# Patient Record
Sex: FEMALE | Race: WHITE | NOT HISPANIC OR LATINO | Employment: UNEMPLOYED | URBAN - NONMETROPOLITAN AREA
[De-identification: names, ages, dates, MRNs, and addresses within clinical notes are randomized per-mention and may not be internally consistent; named-entity substitution may affect disease eponyms.]

---

## 2019-08-28 ENCOUNTER — TRANSCRIBE ORDERS (OUTPATIENT)
Dept: LAB | Facility: HOSPITAL | Age: 74
End: 2019-08-28

## 2019-08-28 DIAGNOSIS — I63.9 IMPENDING CEREBROVASCULAR ACCIDENT (HCC): Primary | ICD-10-CM

## 2019-09-03 ENCOUNTER — APPOINTMENT (OUTPATIENT)
Dept: ULTRASOUND IMAGING | Facility: HOSPITAL | Age: 74
End: 2019-09-03

## 2021-12-15 ENCOUNTER — HOSPITAL ENCOUNTER (INPATIENT)
Facility: HOSPITAL | Age: 76
LOS: 14 days | Discharge: HOME HEALTH | End: 2021-12-29
Attending: FAMILY MEDICINE | Admitting: FAMILY MEDICINE

## 2021-12-15 DIAGNOSIS — Z87.81 S/P RIGHT HIP FRACTURE: Primary | ICD-10-CM

## 2021-12-15 PROCEDURE — 99223 1ST HOSP IP/OBS HIGH 75: CPT | Performed by: FAMILY MEDICINE

## 2021-12-15 RX ORDER — METOPROLOL SUCCINATE 25 MG/1
25 TABLET, EXTENDED RELEASE ORAL DAILY
Status: CANCELLED | OUTPATIENT
Start: 2021-12-15

## 2021-12-15 RX ORDER — LISINOPRIL 10 MG/1
20 TABLET ORAL DAILY
Status: CANCELLED | OUTPATIENT
Start: 2021-12-15

## 2021-12-15 RX ORDER — ESCITALOPRAM OXALATE 10 MG/1
10 TABLET ORAL DAILY
COMMUNITY

## 2021-12-15 RX ORDER — ATORVASTATIN CALCIUM 20 MG/1
20 TABLET, FILM COATED ORAL DAILY
Status: CANCELLED | OUTPATIENT
Start: 2021-12-15

## 2021-12-15 RX ORDER — METOPROLOL SUCCINATE 25 MG/1
25 TABLET, EXTENDED RELEASE ORAL DAILY
COMMUNITY
End: 2021-12-29 | Stop reason: HOSPADM

## 2021-12-15 RX ORDER — DONEPEZIL HYDROCHLORIDE 5 MG/1
5 TABLET, FILM COATED ORAL NIGHTLY
Status: CANCELLED | OUTPATIENT
Start: 2021-12-15

## 2021-12-15 RX ORDER — CLOPIDOGREL BISULFATE 75 MG/1
75 TABLET ORAL DAILY
Status: DISCONTINUED | OUTPATIENT
Start: 2021-12-16 | End: 2021-12-29 | Stop reason: HOSPADM

## 2021-12-15 RX ORDER — ATORVASTATIN CALCIUM 20 MG/1
20 TABLET, FILM COATED ORAL DAILY
COMMUNITY

## 2021-12-15 RX ORDER — MULTIPLE VITAMINS W/ MINERALS TAB 9MG-400MCG
1 TAB ORAL DAILY
Status: CANCELLED | OUTPATIENT
Start: 2021-12-15

## 2021-12-15 RX ORDER — HYDROCODONE BITARTRATE AND ACETAMINOPHEN 7.5; 325 MG/1; MG/1
1 TABLET ORAL EVERY 6 HOURS PRN
Status: DISCONTINUED | OUTPATIENT
Start: 2021-12-15 | End: 2021-12-29 | Stop reason: HOSPADM

## 2021-12-15 RX ORDER — AMLODIPINE BESYLATE 5 MG/1
5 TABLET ORAL DAILY
COMMUNITY

## 2021-12-15 RX ORDER — CETIRIZINE HYDROCHLORIDE 10 MG/1
5 TABLET ORAL DAILY
Status: DISCONTINUED | OUTPATIENT
Start: 2021-12-16 | End: 2021-12-29 | Stop reason: HOSPADM

## 2021-12-15 RX ORDER — CITALOPRAM 20 MG/1
20 TABLET ORAL DAILY
Status: DISCONTINUED | OUTPATIENT
Start: 2021-12-16 | End: 2021-12-16

## 2021-12-15 RX ORDER — DONEPEZIL HYDROCHLORIDE 5 MG/1
5 TABLET, FILM COATED ORAL NIGHTLY
Status: DISCONTINUED | OUTPATIENT
Start: 2021-12-15 | End: 2021-12-29 | Stop reason: HOSPADM

## 2021-12-15 RX ORDER — LISINOPRIL 20 MG/1
20 TABLET ORAL DAILY
COMMUNITY

## 2021-12-15 RX ORDER — DONEPEZIL HYDROCHLORIDE 5 MG/1
5 TABLET, FILM COATED ORAL NIGHTLY
COMMUNITY

## 2021-12-15 RX ORDER — AMLODIPINE BESYLATE 5 MG/1
5 TABLET ORAL
Status: DISCONTINUED | OUTPATIENT
Start: 2021-12-16 | End: 2021-12-29 | Stop reason: HOSPADM

## 2021-12-15 RX ORDER — LISINOPRIL 10 MG/1
20 TABLET ORAL
Status: DISCONTINUED | OUTPATIENT
Start: 2021-12-16 | End: 2021-12-29 | Stop reason: HOSPADM

## 2021-12-15 RX ORDER — CETIRIZINE HYDROCHLORIDE 10 MG/1
10 TABLET ORAL DAILY
COMMUNITY

## 2021-12-15 RX ORDER — MULTIPLE VITAMINS W/ MINERALS TAB 9MG-400MCG
1 TAB ORAL DAILY
COMMUNITY

## 2021-12-15 RX ORDER — CLOPIDOGREL BISULFATE 75 MG/1
75 TABLET ORAL DAILY
COMMUNITY

## 2021-12-15 RX ORDER — ATORVASTATIN CALCIUM 20 MG/1
20 TABLET, FILM COATED ORAL NIGHTLY
Status: DISCONTINUED | OUTPATIENT
Start: 2021-12-15 | End: 2021-12-29 | Stop reason: HOSPADM

## 2021-12-15 RX ORDER — CETIRIZINE HYDROCHLORIDE 10 MG/1
10 TABLET ORAL DAILY
Status: CANCELLED | OUTPATIENT
Start: 2021-12-15

## 2021-12-15 RX ORDER — METOPROLOL SUCCINATE 25 MG/1
25 TABLET, EXTENDED RELEASE ORAL
Status: DISCONTINUED | OUTPATIENT
Start: 2021-12-16 | End: 2021-12-24

## 2021-12-15 RX ORDER — ASPIRIN 81 MG/1
81 TABLET, CHEWABLE ORAL DAILY
COMMUNITY
End: 2021-12-29 | Stop reason: HOSPADM

## 2021-12-15 RX ORDER — ESCITALOPRAM OXALATE 10 MG/1
10 TABLET ORAL DAILY
Status: CANCELLED | OUTPATIENT
Start: 2021-12-15

## 2021-12-15 RX ORDER — DIPHENOXYLATE HYDROCHLORIDE AND ATROPINE SULFATE 2.5; .025 MG/1; MG/1
1 TABLET ORAL DAILY
Status: DISCONTINUED | OUTPATIENT
Start: 2021-12-16 | End: 2021-12-29 | Stop reason: HOSPADM

## 2021-12-15 RX ORDER — CLOPIDOGREL BISULFATE 75 MG/1
75 TABLET ORAL DAILY
Status: CANCELLED | OUTPATIENT
Start: 2021-12-15

## 2021-12-15 RX ORDER — AMLODIPINE BESYLATE 5 MG/1
5 TABLET ORAL DAILY
Status: CANCELLED | OUTPATIENT
Start: 2021-12-15

## 2021-12-15 RX ORDER — ASPIRIN 81 MG/1
81 TABLET, CHEWABLE ORAL DAILY
Status: CANCELLED | OUTPATIENT
Start: 2021-12-15

## 2021-12-15 RX ADMIN — HYDROCODONE BITARTRATE AND ACETAMINOPHEN 1 TABLET: 7.5; 325 TABLET ORAL at 18:13

## 2021-12-15 RX ADMIN — ATORVASTATIN CALCIUM 20 MG: 20 TABLET, FILM COATED ORAL at 20:39

## 2021-12-15 RX ADMIN — DONEPEZIL HYDROCHLORIDE 5 MG: 5 TABLET, FILM COATED ORAL at 20:39

## 2021-12-15 NOTE — PROGRESS NOTES
Patient Assessment Instrument  Quality Indicators - Admission    Section B. Hearing, Speech Vision  Expression of Ideas and Wants: Expresses complex messages without difficulty and  with speech that is clear and easy to understand.  Understanding Verbal and Non-Verbal Content: Understands: Clear comprehension  without cues or repetitions.    Section C. Cognitive Patterns      Section [id number]. Prior Functioning      Section UJ4539. Prior Device Use      Section NU4503. Self Care Performance      Section WV7991. Self Care Discharge Goals      Section GJ8613. Mobility Performance      Section FJ7326. Mobility Discharge Goals      Section H. Bladder and Bowel      Section I. Active Diagnosis      Section J. Health Conditions      Section K. Swallowing/Nutritional Status      Section M. Skin Conditions      Section N. Medication      Section O. Special Treatments, Procedures, and Programs      OPTIONAL BRANCH FOR TRACKING FALLS  Fall(s) During Shift:    Signed by: Nurse Arpita

## 2021-12-15 NOTE — PROGRESS NOTES
Rehabilitation Nursing  Inpatient Rehabilitation Admission Assessment of Functional Measures  and Plan of Care    Plan of Care  Copy from POC    Functional Measures  SEAN Eating:  SEAN Grooming:  SEAN Bathing:  SEAN Upper Body Dressing:  SEAN Lower Body Dressing:  SEAN Toileting:    SEAN Bladder Management  Level of Assistance:  Frequency/Number of Accidents (4 days prior to admission):  Frequency/Number of Accidents this Shift:    SEAN Bowel Management  Level of Assistance:  Frequency/Number of Accidents (4 days prior to admission):  Frequency/Number of Accidents this Shift:    SEAN Bed/Chair/Wheelchair Transfer:  SEAN Toilet Transfer:  SEAN Tub/Shower Transfer:    Previously Documented Mode of Locomotion at Discharge:  [facility] Expected Mode of Locomotion at Discharge:  SEAN Walk/Wheelchair:  SEAN Stairs:    SEAN Comprehension:  SEAN Expression:  SEAN Social Interaction:  [facility] Problem Solving:  SEAN Memory:    Discharge Functional Goals:    Signed by: Phyllis Johnson Nurse

## 2021-12-15 NOTE — H&P
[facility] HOSPITALIST HISTORY AND PHYSICAL    Patient Identification:  Name:  Mellisa Sneed  Age:  76 y.o.  Sex:  female  :  1945  MRN:  [id number]   Visit Number:  [id number]  Room number:  107/1S  Primary Care Physician:  Rosario Cates MD     Subjective     12/15/2021   Chief complaint:  No chief complaint on file.      History of presenting illness:  76 y.o. female  This pt is seen today for post admission physician evaluation to the inpatient rehabilitation facility.  Patient 76-year-old female with a past history of coronary disease with history of CABG, coronary stent, hyperlipidemia, hypertension.  Patient states that she was home caring her puppy when she was startled and tripped and fell.  Patient fractured her right hip and was taken to [facility] in [place].  Patient had open reduction internal fixation with right hip hemiarthroplasty per Dr. Montemayor.  Had uncomplicated hospital course and was thought to be would benefit from PT OT and inpatient rehab and we agreed except patient in transfer      Patient continued to progress medically.  Physical therapy and Occupational therapy evaluations were completed with recommended acute inpatient rehabilitation referral for continued functional mobility intervention and reeducation.  Acute rehab referral ordered for continued medical monitoring and management post prolonged hospitalization, continued respiratory status monitoring, lab monitoring, pain mgt needs, bowel/bladder care with new medication education, skin monitoring and breakdown prevention along with ongoing medical comorbidities that require ongoing care.    The preadmission mini-FIM score as assessed by the referring facility as follows: Eating 5; grooming 4; bathing 2; dressing upper body for; dressing lower body 2; toileting to; transfers to bed chair wheelchair 3; transfers toilet 3; locomotion 3; bladder management 6; bowel management 6; comprehension 5; expression 7;  social reaction 7; problem 7 follow-up 5; memory 5.  Estimate length stay 2weeks.    ---------------------------------------------------------------------------------------------------------------------   Review of Systems:  General: No fever no chills  HEENT: Negative  Heart: No chest pain  Lungs: Negative  Abdomen: Negative  : Negative  Musculoskeletal: Right hip soreness  Neuro: Negative  Skin: Negative  ---------------------------------------------------------------------------------------------------------------------   No past medical history on file.  No past surgical history on file.  No family history on file.  Social History     Socioeconomic History   • Marital status:    Past medical history coronary disease with history of CABG in coronary artery stent placement  Hypertension  Hyperlipidemia  Dementia    Social history has quit smoking.  No ethanol use    Family history unknown    Allergies no known drug allergies  ---------------------------------------------------------------------------------------------------------------------   Allergies:  Patient has no allergy information on record.  ---------------------------------------------------------------------------------------------------------------------   Medications below are reported home medications pulling from within the system; at this time, these medications have not been reconciled unless otherwise specified and are in the verification process for further verifcation as current home medications.    Prior to Admission Medications     None        Objective     Vital Signs:  Temp:  [97.8 °F (36.6 °C)] 97.8 °F (36.6 °C)  Heart Rate:  [62] 62  Resp:  [18] 18  BP: (158)/(71) 158/71    No data found.  SpO2:  [95 %] 95 %  on   ;   Device (Oxygen Therapy): room air  Body mass index is 24.67 kg/m².    Wt Readings from Last 3 Encounters:   12/15/21 51.7 kg (114 lb)       ---------------------------------------------------------------------------------------------------------------------     Physical exam:  Constitutional:   Elderly female no distress  HEENT: Normocephalic atraumatic  Neck: Supple   Cardiovascular: Regular rate and rhythm  Pulmonary/Chest: Clear to auscultation  Abdominal: Positive bowel sounds soft.   Musculoskeletal: Right hip--wound is dressed at this time but there is no erythema surrounding wound site.  Some bruising noted  Neurological: No focal deficits  Skin: No rash  Peripheral vascular:  Genitourinary::  ---------------------------------------------------------------------------------------------------------------------    No orders to display           Last echocardiogram:    --------------------------------------------------------------------------------------------------------------------  Labs:                Invalid input(s): PROTCrCl cannot be calculated (No successful lab value found.).  No results found for: AMMONIA          No results found for: HGBA1C, POCGLU  No results found for: TSH, FREET4  No results found for: PREGTESTUR, PREGSERUM, HCG, HCGQUANT  Pain Management Panel    There is no flowsheet data to display.       Brief Urine Lab Results     None        No results found for: BLOODCX  No results found for: URINECX  No results found for: WOUNDCX  No results found for: STOOLCX    Last Urine Toxicity    There is no flowsheet data to display.         I have personally looked at the labs and they are summarized above.  ----------------------------------------------------------------------------------------------------------------------  Detailed radiology reports for the last 24 hours:    Imaging Results (Last 24 Hours)     ** No results found for the last 24 hours. **        Final impressions for the last 30 days of radiology reports:    No radiology results for the last 30 days.  I have personally looked at the radiology images and read the  final radiology report.    Assessment & Plan    Status post right hip fracture with right hemiarthroplasty--patient will require physical therapy 90 minutes/day 5 to 6 days/week for up with training, safety, stair navigation, strengthening, therapeutic exercise, range of motion, endurance and gait training.  Require occupational therapy 90 minutes/day 5 to 6 days/week for help with dressing, ADLs, feeding, home skills, safety and toileting.  We expect patient to be able to perform activities of daily living using adaptive equipment for improved function mobility.  Independent safe bowel bladder function.  Able to navigate home community territory safe without injury or falls.  We expect patient will go home with assistance will require home health for PT OT likely require walker and bedside commode at home.    CAD--continue Lipitor, Toprol-XL 25 mg daily, Plavix 75 mg daily    Hypertension continue Toprol, amlodipine, lisinopril    Dementia Aricept 5 mg nightly    Depression Celexa 10 mg daily      VTE Prophylaxis:   Mechanical Order History:     None      Pharmalogical Order History:      Ordered     Dose Route Frequency Stop    12/15/21 1445  enoxaparin (LOVENOX) syringe 40 mg         40 mg SC Every 24 Hours 01/04/22 1529                Fall risk evaluation: Risk for fall secondary to hip fracture        Dewey Campos MD  [facility]  12/15/21  14:46 EST

## 2021-12-16 LAB
ANION GAP SERPL CALCULATED.3IONS-SCNC: 12 MMOL/L (ref 5–15)
BASOPHILS # BLD AUTO: 0.1 10*3/MM3 (ref 0–0.2)
BASOPHILS NFR BLD AUTO: 1.1 % (ref 0–1.5)
BUN SERPL-MCNC: 25 MG/DL (ref 8–23)
BUN/CREAT SERPL: 25 (ref 7–25)
CALCIUM SPEC-SCNC: 8.7 MG/DL (ref 8.6–10.5)
CHLORIDE SERPL-SCNC: 106 MMOL/L (ref 98–107)
CO2 SERPL-SCNC: 20 MMOL/L (ref 22–29)
CREAT SERPL-MCNC: 1 MG/DL (ref 0.57–1)
DEPRECATED RDW RBC AUTO: 48.4 FL (ref 37–54)
EOSINOPHIL # BLD AUTO: 0.32 10*3/MM3 (ref 0–0.4)
EOSINOPHIL NFR BLD AUTO: 3.6 % (ref 0.3–6.2)
ERYTHROCYTE [DISTWIDTH] IN BLOOD BY AUTOMATED COUNT: 14.1 % (ref 12.3–15.4)
GFR SERPL CREATININE-BSD FRML MDRD: 54 ML/MIN/1.73
GLUCOSE SERPL-MCNC: 106 MG/DL (ref 65–99)
HCT VFR BLD AUTO: 28.1 % (ref 34–46.6)
HGB BLD-MCNC: 9 G/DL (ref 12–15.9)
IMM GRANULOCYTES # BLD AUTO: 0.04 10*3/MM3 (ref 0–0.05)
IMM GRANULOCYTES NFR BLD AUTO: 0.4 % (ref 0–0.5)
LYMPHOCYTES # BLD AUTO: 2.49 10*3/MM3 (ref 0.7–3.1)
LYMPHOCYTES NFR BLD AUTO: 27.9 % (ref 19.6–45.3)
MCH RBC QN AUTO: 30.4 PG (ref 26.6–33)
MCHC RBC AUTO-ENTMCNC: 32 G/DL (ref 31.5–35.7)
MCV RBC AUTO: 94.9 FL (ref 79–97)
MONOCYTES # BLD AUTO: 1.07 10*3/MM3 (ref 0.1–0.9)
MONOCYTES NFR BLD AUTO: 12 % (ref 5–12)
NEUTROPHILS NFR BLD AUTO: 4.92 10*3/MM3 (ref 1.7–7)
NEUTROPHILS NFR BLD AUTO: 55 % (ref 42.7–76)
NRBC BLD AUTO-RTO: 0 /100 WBC (ref 0–0.2)
PLATELET # BLD AUTO: 256 10*3/MM3 (ref 140–450)
PMV BLD AUTO: 10.2 FL (ref 6–12)
POTASSIUM SERPL-SCNC: 4 MMOL/L (ref 3.5–5.2)
RBC # BLD AUTO: 2.96 10*6/MM3 (ref 3.77–5.28)
SODIUM SERPL-SCNC: 138 MMOL/L (ref 136–145)
WBC NRBC COR # BLD: 8.94 10*3/MM3 (ref 3.4–10.8)

## 2021-12-16 PROCEDURE — 25010000002 ENOXAPARIN PER 10 MG: Performed by: FAMILY MEDICINE

## 2021-12-16 PROCEDURE — 97535 SELF CARE MNGMENT TRAINING: CPT

## 2021-12-16 PROCEDURE — 97110 THERAPEUTIC EXERCISES: CPT

## 2021-12-16 PROCEDURE — 99231 SBSQ HOSP IP/OBS SF/LOW 25: CPT | Performed by: FAMILY MEDICINE

## 2021-12-16 PROCEDURE — 97161 PT EVAL LOW COMPLEX 20 MIN: CPT

## 2021-12-16 PROCEDURE — 97530 THERAPEUTIC ACTIVITIES: CPT

## 2021-12-16 PROCEDURE — 85025 COMPLETE CBC W/AUTO DIFF WBC: CPT | Performed by: FAMILY MEDICINE

## 2021-12-16 PROCEDURE — 97167 OT EVAL HIGH COMPLEX 60 MIN: CPT

## 2021-12-16 PROCEDURE — 97116 GAIT TRAINING THERAPY: CPT

## 2021-12-16 PROCEDURE — 80048 BASIC METABOLIC PNL TOTAL CA: CPT | Performed by: FAMILY MEDICINE

## 2021-12-16 RX ORDER — ESCITALOPRAM OXALATE 10 MG/1
10 TABLET ORAL DAILY
Status: DISCONTINUED | OUTPATIENT
Start: 2021-12-17 | End: 2021-12-29 | Stop reason: HOSPADM

## 2021-12-16 RX ADMIN — DONEPEZIL HYDROCHLORIDE 5 MG: 5 TABLET, FILM COATED ORAL at 20:24

## 2021-12-16 RX ADMIN — AMLODIPINE BESYLATE 5 MG: 5 TABLET ORAL at 09:38

## 2021-12-16 RX ADMIN — Medication 1 TABLET: at 09:38

## 2021-12-16 RX ADMIN — CLOPIDOGREL 75 MG: 75 TABLET, FILM COATED ORAL at 09:38

## 2021-12-16 RX ADMIN — ENOXAPARIN SODIUM 40 MG: 40 INJECTION SUBCUTANEOUS at 17:15

## 2021-12-16 RX ADMIN — METOPROLOL SUCCINATE 25 MG: 25 TABLET, EXTENDED RELEASE ORAL at 09:38

## 2021-12-16 RX ADMIN — CITALOPRAM HYDROBROMIDE 20 MG: 20 TABLET ORAL at 09:38

## 2021-12-16 RX ADMIN — HYDROCODONE BITARTRATE AND ACETAMINOPHEN 1 TABLET: 7.5; 325 TABLET ORAL at 13:59

## 2021-12-16 RX ADMIN — LISINOPRIL 20 MG: 10 TABLET ORAL at 09:38

## 2021-12-16 RX ADMIN — CETIRIZINE HYDROCHLORIDE 5 MG: 10 TABLET, FILM COATED ORAL at 09:38

## 2021-12-16 RX ADMIN — ATORVASTATIN CALCIUM 20 MG: 20 TABLET, FILM COATED ORAL at 20:24

## 2021-12-16 NOTE — THERAPY EVALUATION
Inpatient Rehabilitation - Physical Therapy Initial Evaluation       MIAN Pizano     Patient Name: Mellisa Sneed  : 1945  MRN: [id number]    Today's Date: 2021                    Admit Date: 12/15/2021      Visit Dx:   No diagnosis found.    Patient Active Problem List   Diagnosis   • S/P right hip fracture       Past Medical History:   Diagnosis Date   • Arthritis    • CHF (congestive heart failure) (HCC)    • Hypertension        Past Surgical History:   Procedure Laterality Date   • CARDIAC SURGERY     • FRACTURE SURGERY         PT ASSESSMENT (last 12 hours)     IRF PT Evaluation and Treatment     Row Name 21 [facility]          PT Time and Intention    Document Type initial evaluation; daily treatment  -LB     Mode of Treatment individual therapy; physical therapy  -LB     Row Name 21 1351          General Information    Existing Precautions/Restrictions fall; right; hip  history of dementia  -LB     Row Name 21 135          Cognition/Psychosocial    Affect/Mental Status (Cognitive) --  alert and cooperative, confusion noted  -LB     Follows Commands (Cognition) verbal cues/prompting required; physical/tactile prompts required  -LB     Personal Safety Interventions gait belt; nonskid shoes/slippers when out of bed; supervised activity  -LB     Row Name 21 [facility]          Pain Scale: FACES Pre/Post-Treatment    Pain: FACES Scale, Pretreatment 6-->hurts even more  -LB     Posttreatment Pain Rating 6-->hurts even more  -LB     Pain Location - Side Right  -LB     Pain Location hip  -LB     Pre/Posttreatment Pain Comment rest, repositioned  -LB     Row Name 21 135          Mobility    Left Lower Extremity (Weight-bearing Status) weight-bearing as tolerated (WBAT)  -LB     Right Lower Extremity (Weight-bearing Status) weight-bearing as tolerated (WBAT)  -LB     Row Name 21 135          Bed Mobility    Supine-Sit [place] (Bed Mobility) minimum assist (75% patient effort);  verbal cues; nonverbal cues (demo/gesture)  -LB     Sit-Supine [place] (Bed Mobility) moderate assist (50% patient effort); verbal cues; nonverbal cues (demo/gesture)  -LB     Assistive Device (Bed Mobility) bed rails  -LB     Row Name 12/16/21 1351          Transfers    Bed-Chair [place] (Transfers) moderate assist (50% patient effort); verbal cues; nonverbal cues (demo/gesture)  -LB     Chair-Bed [place] (Transfers) moderate assist (50% patient effort); verbal cues; nonverbal cues (demo/gesture)  -LB     Assistive Device (Bed-Chair Transfers) wheelchair  -LB     Sit-Stand [place] (Transfers) minimum assist (75% patient effort); verbal cues; nonverbal cues (demo/gesture)  -LB     Stand-Sit [place] (Transfers) minimum assist (75% patient effort); verbal cues; nonverbal cues (demo/gesture)  -LB     Row Name 12/16/21 1351          Chair-Bed Transfer    Assistive Device (Chair-Bed Transfers) wheelchair  -LB     Row Name 12/16/21 1351          Sit-Stand Transfer    Assistive Device (Sit-Stand Transfers) walker, front-wheeled  -LB     Row Name 12/16/21 135          Stand-Sit Transfer    Assistive Device (Stand-Sit Transfers) walker, front-wheeled  -LB     Row Name 12/16/21 1351          Gait/Stairs (Locomotion)    [place] Level (Gait) contact guard; verbal cues; nonverbal cues (demo/gesture)  -LB     Assistive Device (Gait) walker, front-wheeled  -LB     Distance in Feet (Gait) am-60', pm-70'  -LB     Deviations/Abnormal Patterns (Gait) antalgic; luis decreased; gait speed decreased; stride length decreased  -LB     Bilateral Gait Deviations forward flexed posture; weight shift ability decreased  -LB     Row Name 12/16/21 1351          Safety Issues, Functional Mobility    Safety Issues Affecting Function (Mobility) insight into deficits/self-awareness; safety precautions follow-through/compliance  reviewed THR precautions  -LB     Impairments Affecting Function (Mobility) balance;  cognition; endurance/activity tolerance; pain; range of motion (ROM); strength  -LB     Row Name 12/16/21 [facility]          Motor Skills    Therapeutic Exercise --  sitting ex 2 sets, supine ex  -LB     Row Name 12/16/21 [facility]          IRF PT Goals    Bed Mobility Goal Selection (PT-IRF) bed mobility, PT goal 1  -LB     Transfer Goal Selection (PT-IRF) transfers, PT goal 1  -LB     Gait (Walking Locomotion) Goal Selection (PT-IRF) gait, PT goal 1  -LB     Row Name 12/16/21 [facility]          Bed Mobility Goal 1 (PT-IRF)    Activity/Assistive Device (Bed Mobility Goal 1, PT-IRF) sit to supine/supine to sit  -LB     [place] Level (Bed Mobility Goal 1, PT-IRF) independent  -LB     Time Frame (Bed Mobility Goal 1, PT-IRF) by discharge  -LB     Row Name 12/16/21 [facility]          Transfer Goal 1 (PT-IRF)    Activity/Assistive Device (Transfer Goal 1, PT-IRF) sit-to-stand/stand-to-sit; bed-to-chair/chair-to-bed  -LB     [place] Level (Transfer Goal 1, PT-IRF) supervision required  -LB     Time Frame (Transfer Goal 1, PT-IRF) by discharge  -LB     Row Name 12/16/21 [facility]          Gait/Walking Locomotion Goal 1 (PT-IRF)    Activity/Assistive Device (Gait/Walking Locomotion Goal 1, PT-IRF) walker, rolling  -LB     Gait/Walking Locomotion Distance Goal 1 (PT-IRF) 300'  -LB     [place] Level (Gait/Walking Locomotion Goal 1, PT-IRF) supervision required  -LB     Time Frame (Gait/Walking Locomotion Goal 1, PT-IRF) by discharge  -LB     Row Name 12/16/21 [facility]          Positioning and Restraints    Pre-Treatment Position --  am-w/c, pm-bed  -LB     In Wheelchair with OT  bid  -LB     Row Name 12/16/21 [facility]          Therapy Assessment/Plan (PT)    Patient's Goals For Discharge return home  -LB     Row Name 12/16/21 [facility]          Therapy Assessment/Plan (PT)    PT Diagnosis (PT) s/p R THR  -LB     Rehab Potential/Prognosis (PT) adequate, monitor progress closely  -LB     Frequency of Treatment (PT) 5 times per week  -LB      Estimated Duration of Therapy (PT) 2 weeks  -LB     Problem List (PT) balance; cognition; mobility; range of motion (ROM); strength; pain  -LB     Activity Limitations Related to Problem List (PT) unable to ambulate safely; unable to transfer safely  -LB     Row Name 12/16/21 1351          Therapy Plan Review/Discharge Plan (PT)    Therapy Plan Review (PT) evaluation/treatment results reviewed; care plan/treatment goals reviewed; risks/benefits reviewed; participants voiced agreement with care plan  -LB     Anticipated Equipment Needs at Discharge (PT Eval) --  tbd  -LB     Expected Discharge Disposition (PT Eval) home with home health care; home with caregiver  -LB           User Key  (r) = Recorded By, (t) = Taken By, (c) = Cosigned By    Initials Name Provider Type    Juanita Lopez, PT Physical Therapist              Wound 12/15/21 1530 Right anterior greater trochanter (Active)   Dressing Appearance intact 12/16/21 0934   Closure RAMON 12/15/21 1910   Base dressing in place, unable to visualize 12/15/21 1910   Periwound edematous; ecchymotic 12/15/21 1910   Drainage Characteristics/Odor sanguineous 12/15/21 1531   Drainage Amount small 12/15/21 1531   Dressing Care other (see comments) 12/15/21 1910     Physical Therapy Education                 Title: PT OT SLP Therapies (Done)     Topic: Physical Therapy (Done)     Point: Mobility training (Done)     Learning Progress Summary           Patient Acceptance, E, VU,NR by LB at 12/16/2021 1401                   Point: Home exercise program (Done)     Learning Progress Summary           Patient Acceptance, E, VU,NR by LB at 12/16/2021 1401                   Point: Body mechanics (Done)     Learning Progress Summary           Patient Acceptance, E, VU,NR by LB at 12/16/2021 1401                   Point: Precautions (Done)     Learning Progress Summary           Patient Acceptance, E, VU,NR by LB at 12/16/2021 1401                               User Key      Initials Effective Dates Name Provider Type Discipline    LB 06/16/21 -  Juanita Perez PT Physical Therapist PT                PT Recommendation and Plan    Planned Therapy Interventions (PT): balance training, bed mobility training, gait training, patient/family education, ROM (range of motion), strengthening, transfer training  Frequency of Treatment (PT): 5 times per week  Anticipated Equipment Needs at Discharge (PT Eval):  (tbd)                  Time Calculation:      PT Charges     Row Name 12/16/21 1402 12/16/21 1401          Time Calculation    Start Time 1245  -LB 1000  -LB     Stop Time 1330  -LB 1045  -LB     Time Calculation (min) 45 min  -LB 45 min  -LB     PT Received On -- 12/16/21  -LB     PT Goal Re-Cert Due Date -- 12/23/21  -LB           User Key  (r) = Recorded By, (t) = Taken By, (c) = Cosigned By    Initials Name Provider Type    LB Juanita Perez, PT Physical Therapist                Therapy Charges for Today     Code Description Service Date Service Provider Modifiers Qty    12574625441 HC PT EVAL LOW COMPLEXITY 1 12/16/2021 Juanita Perez, PT GP 1    92717407787 HC GAIT TRAINING EA 15 MIN 12/16/2021 Juanita Perez, PT GP 2    30327085576 HC PT THER PROC EA 15 MIN 12/16/2021 Juanita Perez, PT GP 3                   Juanita Perez, PT  12/16/2021

## 2021-12-16 NOTE — PLAN OF CARE
Goal Outcome Evaluation:  Plan of Care Reviewed With: patient        Progress: improving  Outcome Summary: Patient done scheduled therapies. No complaints at this time. PRN pain medications given. Continue POC

## 2021-12-16 NOTE — PROGRESS NOTES
Patient Assessment Instrument  Quality Indicators - Admission    Section B. Hearing, Speech Vision      Section C. Cognitive Patterns      Section QL4670. Prior Functioning      Section SA2221. Prior Device Use      Section AJ6207. Self Care Performance      Section WG9900. Self Care Discharge Goals      Section YJ7747. Mobility Performance     Roll Left and Right: [place] does less than half the effort. [place] lifts, holds  or supports trunk or limbs but provides less than half the effort.   Sit to Lying: [place] does less than half the effort. [place] lifts, holds or  supports trunk or limbs but provides less than half the effort.   Lying to Sitting on Side of Bed: [place] does less than half the effort. [place]  lifts, holds or supports trunk or limbs but provides less than half the effort.   Sit to Stand: [place] does less than half the effort. [place] lifts, holds or  supports trunk or limbs but provides less than half the effort.   Chair/Bed to Chair Transfer: [place] does less than half the effort. [place]  lifts, holds or supports trunk or limbs but provides less than half the effort.   Toilet Transfer [place] does less than half the effort. [place] lifts, holds or  supports trunk or limbs but provides less than half the effort.   Car Transfer: Not attempted due to medical or safety concerns.   Walk 10 Feet:   [place] provides verbal cues and/or touching/steadying and/or  contact guard assistance as patient completes activity. Assistance may be  provided throughout the activity or intermittently.  Walk 50 Feet with 2 Turns:   [place] provides verbal cues and/or  touching/steadying and/or contact guard assistance as patient completes  activity. Assistance may be provided throughout the activity or intermittently.  Walk 150 Feet:   Not attempted due to medical or safety concerns.  Walking 10 Feet on Uneven Surfaces:   Not attempted due to medical or safety  concerns.  1 Step Over Curb or Up/Down Stair:   Not attempted due  to medical or safety  concerns.  Picking up an Object:   Not attempted due to medical or safety concerns.  Uses Wheelchair/Scooter: No    Section FH3962. Mobility Discharge Goals     Sit to Stand: [place] provides verbal cues or touching/steadying assistance as  patient completes activity.   Chair/Bed to Chair Transfer: [place] provides verbal cues or touching/steadying  assistance as patient completes activity.   Walk Discharge Goals:   Walk 150 Feet: [place] provides verbal cues or touching/steadying assistance as  patient completes activity.    Section H. Bladder and Bowel      Section I. Active Diagnosis      Section J. Health Conditions      Section K. Swallowing/Nutritional Status      Section M. Skin Conditions      Section N. Medication      Section O. Special Treatments, Procedures, and Programs      OPTIONAL BRANCH FOR TRACKING FALLS  Fall(s) During Shift:    Signed by: Juanita Perez, Physical Therapist

## 2021-12-16 NOTE — PROGRESS NOTES
Inpatient Rehabilitation Functional Measures Assessment and Plan of Care    Plan of Care      Functional Measures  SEAN Eating:  SEAN Grooming:  SEAN Bathing:  SEAN Upper Body Dressing:  SEAN Lower Body Dressing:  SEAN Toileting:    SEAN Bladder Management  Level of Assistance:  Frequency/Number of Accidents this Shift:    SEAN Bowel Management  Level of Assistance:  Frequency/Number of Accidents this Shift:    SEAN Bed/Chair/Wheelchair Transfer:  Bed/chair/wheelchair Transfer Score = 3.  Patient performs 50-74% of effort and requires moderate assistance (some  lifting) for transferring to and from the bed/chair/wheelchair. Patient requires  the following assistive device(s): Walker. Seating system of wheelchair.  SEAN Toilet Transfer:  SEAN Tub/Shower Transfer:    Previously Documented Mode of Locomotion at Discharge:  SEAN Expected Mode of Locomotion at Discharge: The expected mode of most  frequently used locomotion, at discharge, is expected to be walking.  SEAN Walk/Wheelchair:  WHEELCHAIR OBSERVATION   Wheelchair did not occur.    WALK OBSERVATION   Walk Distance Scale = 2.  Distance walked is 50 -149 feet. Walk Score = 2.  Patient performs 75% or more of effort and requires minimal assistance.  Incidental assistance, contact guard or steadying was provided. Patient walked a  distance of 70 feet. Patient requires the following assistive device(s): Rolling  walker.  SEAN Stairs:  Stairs did not occur.    SEAN Comprehension:  SEAN Expression:  SEAN Social Interaction:  SEAN Problem Solving:  SEAN Memory:    Therapy Mode Minutes  Occupational Therapy:  Physical Therapy: Individual: 90 minutes.  Speech Language Pathology:    Discharge Functional Goals:  Bed, Chair, Wheelchair Transfers: 5  Walk: 5    Signed by: Juanita Perez, Physical Therapist

## 2021-12-16 NOTE — PROGRESS NOTES
Patient Assessment Instrument  Quality Indicators - Admission    Section B. Hearing, Speech Vision      Section C. Cognitive Patterns      Section LA8567. Prior Functioning      Section [id number]. Prior Device Use      Section [id number]. Self Care Performance     Eating: [place] sets up or cleans up; patient completes activity. [place] assists  only prior to or following the activity.   Oral Hygiene: [place] sets up or cleans up; patient completes activity. [place]  assists only prior to or following the activity.   Toileting Hygiene: [place] does all of the effort. Patient does none of the  effort to complete the activity. Or, the assistance of 2 or more helpers is  required for the patient to complete the activity.   Shower/Bathe Self: [place] does more than half the effort. [place] lifts or holds  trunk or limbs and provides more than half the effort.   Upper Body Dressing: [place] does less than half the effort. [place] lifts, holds  or supports trunk or limbs but provides less than half the effort.   Lower Body Dressing: [place] does all of the effort. Patient does none of the  effort to complete the activity. Or, the assistance of 2 or more helpers is  required for the patient to complete the activity.   Putting On/Taking Off Footwear: [place] does all of the effort. Patient does  none of the effort to complete the activity. Or, the assistance of 2 or more  helpers is required for the patient to complete the activity.    Section CT8067. Self Care Discharge Goals     Eating: Patient completed the activities by him/herself with no assistance from  a helper.   Oral Hygiene: [place] sets up or cleans up; patient completes activity. [place]  assists only prior to or following the activity.   Toileting Hygiene: [place] does less than half the effort. [place] lifts, holds  or supports trunk or limbs but provides less than half the effort.   Shower/Bathe Self: [place] does less than half the effort. [place] lifts, holds  or supports trunk  or limbs but provides less than half the effort.   Upper Body Dressing: [place] sets up or cleans up; patient completes activity.  [place] assists only prior to or following the activity.   Lower Body Dressing: [place] does less than half the effort. [place] lifts, holds  or supports trunk or limbs but provides less than half the effort.   Putting On/Taking Off Footwear: [place] does less than half the effort. [place]  lifts, holds or supports trunk or limbs but provides less than half the effort.    Section KY5483. Mobility Performance      Section DS3754. Mobility Discharge Goals      Section H. Bladder and Bowel      Section I. Active Diagnosis      Section J. Health Conditions      Section K. Swallowing/Nutritional Status      Section M. Skin Conditions      Section N. Medication      Section O. Special Treatments, Procedures, and Programs      OPTIONAL BRANCH FOR TRACKING FALLS  Fall(s) During Shift:    Signed by: Sherry Varela Occupational Therapist

## 2021-12-16 NOTE — THERAPY TREATMENT NOTE
Inpatient Rehabilitation - Occupational Therapy Treatment Note     [facility]     Patient Name: Mellisa Sneed  : 1945  MRN: [id number]    Today's Date: 2021                 Admit Date: 12/15/2021       No diagnosis found.    Patient Active Problem List   Diagnosis   • S/P right hip fracture       Past Medical History:   Diagnosis Date   • Arthritis    • CHF (congestive heart failure) (HCC)    • Hypertension        Past Surgical History:   Procedure Laterality Date   • CARDIAC SURGERY     • FRACTURE SURGERY               IRF OT ASSESSMENT FLOWSHEET (last 12 hours)     IRF OT Evaluation and Treatment     Row Name 21 1514 21 1107       OT Time and Intention    Document Type daily treatment  -CJ initial evaluation  -TM    Mode of Treatment occupational therapy  -CJ occupational therapy  -TM    Patient Effort -- adequate  -TM    Symptoms Noted During/After Treatment --  c/o R hip pain;  RN notified  - none  -TM    Row Name 21 1107          Relationship/Environment    Lives With alone; other (see comments)  pt reported her 25 year old grandson lives at times with her.  -     Row Name 21 1514 21 1107       General Information    Patient Profile Reviewed -- yes  -TM    Patient/Family/Caregiver Comments/Observations agreeable to therapy  - --    General Observations of Patient -- Pt agreeable for therapy.  -TM    Existing Precautions/Restrictions fall; hip  -CJ fall; hip  -TM    Row Name 21 1107          Previous Level of Function/Home Environm    BADLs, Premorbid Functional Level independent  -TM     Row Name 21 1107          Living Environment    Current Living Arrangements home/apartment/condo; other (see comments)  apartment  -TM     Home Accessibility stairs to enter home  -TM     Row Name 21 1107          Home Use of Assistive/Adaptive Equipment    Equipment Currently Used at Home cane, quad; wheelchair; other (see comments)  per pt  -TM     Row Name  12/16/21 1514 12/16/21 1107       Cognition/Psychosocial    Affect/Mental Status (Cognitive) --  confusion noted  - --    Orientation Status (Cognition) -- oriented to; person; situation; verbal cues/prompts needed for orientation; other (see comments)  confused at times  -    Follows Commands (Cognition) verbal cues/prompting required; repetition of directions required  -CJ follows one-step commands; verbal cues/prompting required; repetition of directions required; increased processing time needed  -    Row Name 12/16/21 1107          Range of Motion Comprehensive    Comment, General Range of Motion BUE WFL  -     Row Name 12/16/21 1107          Strength Comprehensive (MMT)    Comment, General Manual Muscle Testing (MMT) Assessment 3+/5  -     Row Name 12/16/21 1107          Transfer Assessment/Treatment    Transfers toilet transfer  -     Row Name 12/16/21 1514 12/16/21 1107       Transfers    Chair-Bed [place] (Transfers) minimum assist (75% patient effort); verbal cues  - --    [place] Level (Toilet Transfer) -- minimum assist (75% patient effort); verbal cues  -    Assistive Device (Toilet Transfer) -- grab bars/safety frame; raised toilet seat; wheelchair  -    Row Name 12/16/21 1514          Chair-Bed Transfer    Assistive Device (Chair-Bed Transfers) wheelchair  -     Row Name 12/16/21 1107          Toilet Transfer    Type (Toilet Transfer) stand pivot/stand step  -     Row Name 12/16/21 1514 12/16/21 1107       Motor Skills    Motor Skills -- coordination; functional endurance; therapeutic exercise; motor control/coordination interventions; other (see comments)  BUE FMC decreased  -    Motor Control/Coordination Interventions therapeutic exercise/ROM; gross motor coordination activities; fine motor manipulation/dexterity activities  BUE ther ex/act, GMC/FMC, hand exerciser  - --  -    Row Name 12/16/21 1107          Bathing    Position (Bathing) supported sitting  -      Comment (Bathing) maxA/modA  -TM     Row Name 12/16/21 1107          Upper Body Dressing    [place] Level (Upper Body Dressing) minimum assist (75% or more patient effort); verbal cues  -TM     Position (Upper Body Dressing) supported sitting  -TM     Row Name 12/16/21 1107          Lower Body Dressing    [place] Level (Lower Body Dressing) dependent (less than 25% patient effort); maximum assist (25% patient effort); verbal cues  -TM     Position (Lower Body Dressing) supported sitting  -TM     Row Name 12/16/21 1514 12/16/21 1107       Grooming    [place] Level (Grooming) set up  -CJ set up  -TM    Position (Grooming) -- supported sitting  -TM    Row Name 12/16/21 1107          Toileting    [place] Level (Toileting) dependent (less than 25% patient effort); verbal cues  -TM     Assistive Device Use (Toileting) raised toilet seat; grab bar/safety frame  -TM     Position (Toileting) supported sitting  -TM     Comment (Toileting) totalA  -TM     Row Name 12/16/21 1107          Self-Feeding    [place] Level (Self-Feeding) set up  -TM     Position (Self-Feeding) supported sitting  -TM     Row Name 12/16/21 1107          IRF OT Goals    UB Dressing Goal Selection (OT-IRF) UB dressing, OT goal 1  -TM     LB Dressing Goal Selection (OT-IRF) LB dressing, OT goal 1; LB dressing, OT goal 2  -TM     Toileting Goal Selection (OT-IRF) toileting, OT goal 1; toileting, OT goal 2  -TM     Row Name 12/16/21 1107          UB Dressing Goal 1 (OT-IRF)    [place] (UB Dress Goal 1, OT-IRF) set-up required  -TM     Time Frame (UB Dressing Goal 1, OT-IRF) short-term goal (STG)  -TM     Row Name 12/16/21 1107          LB Dressing Goal 1 (OT-IRF)    [place] (LB Dressing Goal 1, OT-IRF) maximum assist (25-49% patient effort); verbal cues required  -TM     Time Frame (LB Dressing Goal 1, OT-IRF) short-term goal (STG)  -TM     Row Name 12/16/21 1107          LB Dressing Goal 2 (OT-IRF)    [place] (LB  Dressing Goal 2, OT-IRF) moderate assist (50-74% patient effort)  -TM     Time Frame (LB Dressing Goal 2, OT-IRF) long-term goal (LTG); by discharge  -     Row Name 12/16/21 1107          Toileting Goal 1 (OT-IRF)    [place] Level (Toileting Goal 1, OT-IRF) maximum assist (25-49% patient effort)  -TM     Time Frame (Toileting Goal 1, OT-IRF) short-term goal (STG)  -     Row Name 12/16/21 1107          Toileting Goal 2 (OT-IRF)    [place] Level (Toileting Goal 2, OT-IRF) moderate assist (50-74% patient effort)  -TM     Time Frame (Toileting Goal 2, OT-IRF) long-term goal (LTG); by discharge  -     Row Name 12/16/21 1514          Positioning and Restraints    Post Treatment Position bed  -     In Bed call light within reach; encouraged to call for assist; exit alarm on; notified nsg  -     Row Name 12/16/21 1107          Therapy Assessment/Plan (OT)    Patient's Goals For Discharge return home  -     Row Name 12/16/21 1107          Therapy Assessment/Plan (OT)    OT Diagnosis R Hip hemiarthroplasty (R displaced femoral neck fx)  -     Rehab Potential/Prognosis (OT) adequate, monitor progress closely  -     Frequency of Treatment (OT) 5 times per week  -     Estimated Duration of Therapy (OT) 2 weeks  -     Problem List (OT) other (see comments)  decreased ADLs, strength, fxal mobility, coordination  -     Planned Therapy Interventions (OT) activity tolerance training; adaptive equipment training; BADL retraining; IADL retraining; ROM/therapeutic exercise; strengthening exercise; transfer/mobility retraining; occupation/activity based interventions; patient/caregiver education/training  -     Row Name 12/16/21 1107          Therapy Plan Review/Discharge Plan (OT)    Expected Discharge Disposition (OT) home with 24/7 care  -           User Key  (r) = Recorded By, (t) = Taken By, (c) = Cosigned By    Initials Name Effective Dates    CJ Khalida Glover, OT 06/16/21 -      [place]  Sherry Turcios OT 06/16/21 -                  Occupational Therapy Education                 Title: PT OT SLP Therapies (Done)     Topic: Occupational Therapy (Done)     Point: ADL training (Done)     Description:   Instruct learner(s) on proper safety adaptation and remediation techniques during self care or transfers.   Instruct in proper use of assistive devices.              Learning Progress Summary           Patient Acceptance, E,D, VU,NR by  at 12/16/2021 1522    Acceptance, E,D, VU,NR by  at 12/16/2021 1126    Acceptance, E,D, VU,NR by  at 12/16/2021 1126                   Point: Precautions (Done)     Description:   Instruct learner(s) on prescribed precautions during self-care and functional transfers.              Learning Progress Summary           Patient Acceptance, E,D, VU,NR by  at 12/16/2021 1522    Acceptance, E,D, VU,NR by  at 12/16/2021 1126    Acceptance, E,D, VU,NR by  at 12/16/2021 1126                               User Key     Initials Effective Dates Name Provider Type Discipline     06/16/21 -  Khalida Glover, OT Occupational Therapist OT     06/16/21 -  Sherry Varela OT Occupational Therapist OT                    OT Recommendation and Plan                         Time Calculation:      Time Calculation- OT     Row Name 12/16/21 1523 12/16/21 1254          Time Calculation- OT    OT Start Time 1335  - 1055  -     OT Stop Time 1420  - 1140  -     OT Time Calculation (min) 45 min  - 45 min  -     Total Timed Code Minutes- OT 45 minute(s)  - 45 minute(s)  -TM     OT Non-Billable Time (min) -- 60 min  -           User Key  (r) = Recorded By, (t) = Taken By, (c) = Cosigned By    Initials Name Provider Type     Khalida Glover OT Occupational Therapist    Sherry Reagan OT Occupational Therapist              Therapy Charges for Today     Code Description Service Date Service Provider Modifiers Qty    11583698386 HC OT SELF  CARE/MGMT/TRAIN EA 15 MIN 12/16/2021 Khalida Glover, OT GO 1    42221139691 HC OT THER PROC EA 15 MIN 12/16/2021 Khalida Glover OT GO 1    [id number]  OT THERAPEUTIC ACT EA 15 MIN 12/16/2021 Khalida Golver OT GO 1                   Khalida Glover OT  12/16/2021

## 2021-12-16 NOTE — PROGRESS NOTES
Psychiatric  PROGRESS NOTE     Patient Identification:  Name:  Mellisa Sneed  Age:  76 y.o.  Sex:  female  :  1945  MRN:  [id number]  Visit Number:  [id number]  ROOM: [facility]     Primary Care Provider:  Rosario Cates MD    Length of stay in inpatient status:  1    Subjective     Chief Compliant:  No chief complaint on file.      History of Presenting Illness: 76-year-old female with history of CAD with history of CABG, coronary stents, hyperlipidemia and hypertension.  Patient is here for follow-up and rehab of recent right hip fracture with open reduction internal fixation hemiarthroplasty.  Patient states she is doing well this morning has no new complaints    Objective     Current Hospital Meds:amLODIPine, 5 mg, Oral, Q24H  atorvastatin, 20 mg, Oral, Nightly  cetirizine, 5 mg, Oral, Daily  citalopram, 20 mg, Oral, Daily  clopidogrel, 75 mg, Oral, Daily  donepezil, 5 mg, Oral, Nightly  enoxaparin, 40 mg, Subcutaneous, Q24H  influenza vaccine, 0.5 mL, Intramuscular, Once  lisinopril, 20 mg, Oral, Q24H  metoprolol succinate XL, 25 mg, Oral, Q24H  multivitamin, 1 tablet, Oral, Daily       ----------------------------------------------------------------------------------------------------------------------  Vital Signs:  Temp:  [97.8 °F (36.6 °C)-98.3 °F (36.8 °C)] 97.9 °F (36.6 °C)  Heart Rate:  [62-87] 87  Resp:  [16-20] 16  BP: (145-170)/(71-82) 145/80  SpO2:  [94 %-95 %] 94 %  on   ;   Device (Oxygen Therapy): room air  Body mass index is 24.67 kg/m².    Wt Readings from Last 3 Encounters:   12/15/21 51.7 kg (114 lb)     Intake & Output (last 3 days)        07 0700 12/14 0701  12/15 0700 12/15 0701  12/16 07 07 0700    P.O.   360 600    Total Intake(mL/kg)   360 (7) 600 (11.6)    Net   +360 +600            Urine Unmeasured Occurrence   4 x 2 x    Stool Unmeasured Occurrence    1 x        Diet  Regular  ----------------------------------------------------------------------------------------------------------------------  Physical exam:  Constitutional:   No acute distress  HEENT: Normocephalic atraumatic  Neck:    Supple  Cardiovascular: Regular rate and rhythm  Pulmonary/Chest: Clear to auscultation  Abdominal: Positive bowel sounds soft.   Musculoskeletal: No arthropathy; status post right hip repair  Neurological: No focal deficits  Skin: No rash  Peripheral vascular:  Genitourinary:  ----------------------------------------------------------------------------------------------------------------------    Last echocardiogram:    ----------------------------------------------------------------------------------------------------------------------  Results from last 7 days   Lab Units 12/16/21  0252   WBC 10*3/mm3 8.94   HEMOGLOBIN g/dL 9.0*   HEMATOCRIT % 28.1*   MCV fL 94.9   MCHC g/dL 32.0   PLATELETS 10*3/mm3 256         Results from last 7 days   Lab Units 12/16/21  0252   SODIUM mmol/L 138   POTASSIUM mmol/L 4.0   CHLORIDE mmol/L 106   CO2 mmol/L 20.0*   BUN mg/dL 25*   CREATININE mg/dL 1.00   EGFR IF NONAFRICN AM mL/min/1.73 54*   CALCIUM mg/dL 8.7   GLUCOSE mg/dL 106*   Estimated Creatinine Clearance: 37.8 mL/min (by C-G formula based on SCr of 1 mg/dL).  No results found for: AMMONIA              No results found for: HGBA1C, POCGLU  No results found for: TSH, FREET4  No results found for: PREGTESTUR, PREGSERUM, HCG, HCGQUANT  Pain Management Panel    There is no flowsheet data to display.       Brief Urine Lab Results     None        No results found for: BLOODCX      No results found for: URINECX  No results found for: WOUNDCX  No results found for: STOOLCX        I have personally looked at the labs and they are summarized above.  ----------------------------------------------------------------------------------------------------------------------  Detailed radiology reports for the last 24  hours:    Imaging Results (Last 24 Hours)     ** No results found for the last 24 hours. **        Final impressions for the last 30 days of radiology reports:    No radiology results for the last 30 days.  I have personally looked at the radiology images and read the final radiology report.    Assessment & Plan    Status post right hip fracture with hemiarthroplasty--patient being evaluated by PT and OT today.    CAD--history of CABG and stent placement.  Continue medical management.  Patient asymptomatic at this time    Hypertension continue current antihypertensive therapy.  Will monitor closely and make necessary adjustments in medication regimen.    Dementia--Aricept 5 mg daily  VTE Prophylaxis:   Mechanical Order History:     None      Pharmalogical Order History:      Ordered     Dose Route Frequency Stop    12/15/21 1445  enoxaparin (LOVENOX) syringe 40 mg         40 mg SC Every 24 Hours 01/04/22 1757                    Dewey Campos MD  [facility]  12/16/21  12:38 EST

## 2021-12-16 NOTE — PROGRESS NOTES
Occupational Therapy: Individual: 45 minutes.    Physical Therapy:    Speech Language Pathology:    Signed by: Sherry Varela, Occupational Therapist

## 2021-12-16 NOTE — PROGRESS NOTES
Rehabilitation Nursing  Inpatient Rehabilitation Plan of Care Note    Plan of Care  Pain    Pain Management (Active)  Current Status (12/15/2021 2:25:00 PM): Potential for pain  Weekly Goal: No pain this week  Discharge Goal: No pain    Safety    Potential for Injury (Active)  Current Status (12/15/2021 2:25:00 PM): At risk for injury  Weekly Goal: No injury this week  Discharge Goal: No injury    Signed by: Maddi Poe, Supervisor

## 2021-12-16 NOTE — PROGRESS NOTES
Rehabilitation Nursing  Inpatient Rehabilitation Plan of Care Note    Plan of Care  Pain    Pain Management (Active)  Current Status (12/15/2021 2:25:00 PM): Potential for pain  Weekly Goal: No pain this week  Discharge Goal: No pain    Safety    Potential for Injury (Active)  Current Status (12/15/2021 2:25:00 PM): At risk for injury  Weekly Goal: No injury this week  Discharge Goal: No injury    Signed by: Wendy Morel RN

## 2021-12-16 NOTE — PROGRESS NOTES
Occupational Therapy: Individual: 45 minutes.    Physical Therapy:    Speech Language Pathology:    Signed by: Khalida Glover, Occupational Therapist

## 2021-12-16 NOTE — THERAPY EVALUATION
Inpatient Rehabilitation - Occupational Therapy Initial Evaluation and Treatment Note    MIAN Harinder     Patient Name: Mellisa Sneed  : 1945  MRN: [id number]    Today's Date: 2021                 Admit Date: 12/15/2021       No diagnosis found.    Patient Active Problem List   Diagnosis   • S/P right hip fracture       Past Medical History:   Diagnosis Date   • Arthritis    • CHF (congestive heart failure) (HCC)    • Hypertension        Past Surgical History:   Procedure Laterality Date   • CARDIAC SURGERY     • FRACTURE SURGERY               IRF OT ASSESSMENT FLOWSHEET (last 12 hours)     IRF OT Evaluation and Treatment     Row Name 21 1107          OT Time and Intention    Document Type initial evaluation  -TM     Mode of Treatment occupational therapy  -TM     Patient Effort adequate  -TM     Symptoms Noted During/After Treatment none  -TM     Row Name 21 1107          Relationship/Environment    Lives With alone; other (see comments)  pt reported her 25 year old grandson lives at times with her.  -TM     Row Name 21 1107          General Information    Patient Profile Reviewed yes  -TM     General Observations of Patient Pt agreeable for therapy.  -TM     Existing Precautions/Restrictions fall; hip  -TM     Row Name 21 1107          Previous Level of Function/Home Environm    BADLs, Premorbid Functional Level independent  -TM     Row Name 21 1107          Living Environment    Current Living Arrangements home/apartment/condo; other (see comments)  apartment  -TM     Home Accessibility stairs to enter home  -TM     Row Name 21 1107          Home Use of Assistive/Adaptive Equipment    Equipment Currently Used at Home cane, quad; wheelchair; other (see comments)  per pt  -TM     Row Name 21 1107          Cognition/Psychosocial    Orientation Status (Cognition) oriented to; person; situation; verbal cues/prompts needed for orientation; other (see comments)   confused at times  -TM     Follows Commands (Cognition) follows one-step commands; verbal cues/prompting required; repetition of directions required; increased processing time needed  -TM     Row Name 12/16/21 1107          Range of Motion Comprehensive    Comment, General Range of Motion BUE WFL  -TM     Row Name 12/16/21 1107          Strength Comprehensive (MMT)    Comment, General Manual Muscle Testing (MMT) Assessment 3+/5  -TM     Row Name 12/16/21 1107          Transfer Assessment/Treatment    Transfers toilet transfer  -     Row Name 12/16/21 1107          Transfers    [place] Level (Toilet Transfer) minimum assist (75% patient effort); verbal cues  -TM     Assistive Device (Toilet Transfer) grab bars/safety frame; raised toilet seat; wheelchair  -     Row Name 12/16/21 1107          Toilet Transfer    Type (Toilet Transfer) stand pivot/stand step  -     Row Name 12/16/21 1107          Motor Skills    Motor Skills coordination; functional endurance; therapeutic exercise; motor control/coordination interventions; other (see comments)  BUE FMC decreased  -TM     Motor Control/Coordination Interventions --  -     Row Name 12/16/21 1107          Bathing    Position (Bathing) supported sitting  -TM     Comment (Bathing) maxA/modA  -     Row Name 12/16/21 1107          Upper Body Dressing    [place] Level (Upper Body Dressing) minimum assist (75% or more patient effort); verbal cues  -TM     Position (Upper Body Dressing) supported sitting  -TM     Row Name 12/16/21 1107          Lower Body Dressing    [place] Level (Lower Body Dressing) dependent (less than 25% patient effort); maximum assist (25% patient effort); verbal cues  -TM     Position (Lower Body Dressing) supported sitting  -TM     Row Name 12/16/21 1107          Grooming    [place] Level (Grooming) set up  -TM     Position (Grooming) supported sitting  -TM     Row Name 12/16/21 1107          Toileting    [place] Level  (Toileting) dependent (less than 25% patient effort); verbal cues  -TM     Assistive Device Use (Toileting) raised toilet seat; grab bar/safety frame  -TM     Position (Toileting) supported sitting  -TM     Comment (Toileting) totalA  -TM     Row Name 12/16/21 1107          Self-Feeding    [place] Level (Self-Feeding) set up  -TM     Position (Self-Feeding) supported sitting  -TM     Row Name 12/16/21 1107          IRF OT Goals    UB Dressing Goal Selection (OT-IRF) UB dressing, OT goal 1  -TM     LB Dressing Goal Selection (OT-IRF) LB dressing, OT goal 1; LB dressing, OT goal 2  -TM     Toileting Goal Selection (OT-IRF) toileting, OT goal 1; toileting, OT goal 2  -TM     Row Name 12/16/21 1107          UB Dressing Goal 1 (OT-IRF)    [place] (UB Dress Goal 1, OT-IRF) set-up required  -TM     Time Frame (UB Dressing Goal 1, OT-IRF) short-term goal (STG)  -TM     Row Name 12/16/21 1107          LB Dressing Goal 1 (OT-IRF)    [place] (LB Dressing Goal 1, OT-IRF) maximum assist (25-49% patient effort); verbal cues required  -TM     Time Frame (LB Dressing Goal 1, OT-IRF) short-term goal (STG)  -TM     Row Name 12/16/21 1107          LB Dressing Goal 2 (OT-IRF)    [place] (LB Dressing Goal 2, OT-IRF) moderate assist (50-74% patient effort)  -TM     Time Frame (LB Dressing Goal 2, OT-IRF) long-term goal (LTG); by discharge  -TM     Row Name 12/16/21 1107          Toileting Goal 1 (OT-IRF)    [place] Level (Toileting Goal 1, OT-IRF) maximum assist (25-49% patient effort)  -TM     Time Frame (Toileting Goal 1, OT-IRF) short-term goal (STG)  -TM     Row Name 12/16/21 1107          Toileting Goal 2 (OT-IRF)    [place] Level (Toileting Goal 2, OT-IRF) moderate assist (50-74% patient effort)  -TM     Time Frame (Toileting Goal 2, OT-IRF) long-term goal (LTG); by discharge  -TM     Row Name 12/16/21 1107          Therapy Assessment/Plan (OT)    Patient's Goals For Discharge return home  -PATRICK Smith  Name 12/16/21 1107          Therapy Assessment/Plan (OT)    OT Diagnosis R Hip hemiarthroplasty (R displaced femoral neck fx)  -TM     Rehab Potential/Prognosis (OT) adequate, monitor progress closely  -TM     Frequency of Treatment (OT) 5 times per week  -TM     Estimated Duration of Therapy (OT) 2 weeks  -TM     Problem List (OT) other (see comments)  decreased ADLs, strength, fxal mobility, coordination  -     Planned Therapy Interventions (OT) activity tolerance training; adaptive equipment training; BADL retraining; IADL retraining; ROM/therapeutic exercise; strengthening exercise; transfer/mobility retraining; occupation/activity based interventions; patient/caregiver education/training  -     Row Name 12/16/21 1107          Therapy Plan Review/Discharge Plan (OT)    Expected Discharge Disposition (OT) home with 24/7 care  -           User Key  (r) = Recorded By, (t) = Taken By, (c) = Cosigned By    Initials Name Effective Dates    TM Sherry Varela OT 06/16/21 -                  Occupational Therapy Education                 Title: PT OT SLP Therapies (Done)     Topic: Occupational Therapy (Done)     Point: ADL training (Done)     Description:   Instruct learner(s) on proper safety adaptation and remediation techniques during self care or transfers.   Instruct in proper use of assistive devices.              Learning Progress Summary           Patient Acceptance, E,D, VU,NR by  at 12/16/2021 1126    Acceptance, E,D, VU,NR by  at 12/16/2021 1126                   Point: Precautions (Done)     Description:   Instruct learner(s) on prescribed precautions during self-care and functional transfers.              Learning Progress Summary           Patient Acceptance, E,D, VU,NR by  at 12/16/2021 1126    Acceptance, E,D, VU,NR by  at 12/16/2021 1126                               User Key     Initials Effective Dates Name Provider Type Discipline     06/16/21 -  Sherry Varela OT  Occupational Therapist OT                    OT Recommendation and Plan    Planned Therapy Interventions (OT): activity tolerance training, adaptive equipment training, BADL retraining, IADL retraining, ROM/therapeutic exercise, strengthening exercise, transfer/mobility retraining, occupation/activity based interventions, patient/caregiver education/training                    Time Calculation:      Time Calculation- OT     Row Name 12/16/21 1254             Time Calculation- OT    OT Start Time 1055  -TM      OT Stop Time 1140  -TM      OT Time Calculation (min) 45 min  -TM      Total Timed Code Minutes- OT 45 minute(s)  -TM      OT Non-Billable Time (min) 60 min  -TM            User Key  (r) = Recorded By, (t) = Taken By, (c) = Cosigned By    Initials Name Provider Type    TM Sherry Varela OT Occupational Therapist              Therapy Charges for Today     Code Description Service Date Service Provider Modifiers Qty    [id number]  OT EVAL HIGH COMPLEXITY 3 12/16/2021 Sherry Varela OT GO 1                   Sherry Varela OT  12/16/2021

## 2021-12-17 PROCEDURE — 97535 SELF CARE MNGMENT TRAINING: CPT

## 2021-12-17 PROCEDURE — 97110 THERAPEUTIC EXERCISES: CPT

## 2021-12-17 PROCEDURE — 97530 THERAPEUTIC ACTIVITIES: CPT

## 2021-12-17 PROCEDURE — 99231 SBSQ HOSP IP/OBS SF/LOW 25: CPT | Performed by: FAMILY MEDICINE

## 2021-12-17 PROCEDURE — 25010000002 ENOXAPARIN PER 10 MG: Performed by: FAMILY MEDICINE

## 2021-12-17 PROCEDURE — 97116 GAIT TRAINING THERAPY: CPT

## 2021-12-17 RX ADMIN — CLOPIDOGREL 75 MG: 75 TABLET, FILM COATED ORAL at 08:22

## 2021-12-17 RX ADMIN — AMLODIPINE BESYLATE 5 MG: 5 TABLET ORAL at 08:22

## 2021-12-17 RX ADMIN — ATORVASTATIN CALCIUM 20 MG: 20 TABLET, FILM COATED ORAL at 20:50

## 2021-12-17 RX ADMIN — HYDROCODONE BITARTRATE AND ACETAMINOPHEN 1 TABLET: 7.5; 325 TABLET ORAL at 05:32

## 2021-12-17 RX ADMIN — CETIRIZINE HYDROCHLORIDE 5 MG: 10 TABLET, FILM COATED ORAL at 08:22

## 2021-12-17 RX ADMIN — ESCITALOPRAM 10 MG: 10 TABLET, FILM COATED ORAL at 08:22

## 2021-12-17 RX ADMIN — Medication 1 TABLET: at 08:22

## 2021-12-17 RX ADMIN — ENOXAPARIN SODIUM 40 MG: 40 INJECTION SUBCUTANEOUS at 16:03

## 2021-12-17 RX ADMIN — LISINOPRIL 20 MG: 10 TABLET ORAL at 08:22

## 2021-12-17 RX ADMIN — DONEPEZIL HYDROCHLORIDE 5 MG: 5 TABLET, FILM COATED ORAL at 20:50

## 2021-12-17 RX ADMIN — METOPROLOL SUCCINATE 25 MG: 25 TABLET, EXTENDED RELEASE ORAL at 08:22

## 2021-12-17 NOTE — PROGRESS NOTES
[facility]  PROGRESS NOTE     Patient Identification:  Name:  Mellisa Sneed  Age:  76 y.o.  Sex:  female  :  1945  MRN:  [id number]  Visit Number:  [id number]  ROOM: [facility]     Primary Care Provider:  Rosario Cates MD    Length of stay in inpatient status:  2    Subjective     Chief Compliant:  No chief complaint on file.      History of Presenting Illness: 76-year-old female with history of CAD with history of CABG, coronary stents, hyperlipidemia and hypertension.  Patient had recent right hip fracture with right hemiarthroplasty.  Patient has no new complaints.    Objective     Current Hospital Meds:amLODIPine, 5 mg, Oral, Q24H  atorvastatin, 20 mg, Oral, Nightly  cetirizine, 5 mg, Oral, Daily  clopidogrel, 75 mg, Oral, Daily  donepezil, 5 mg, Oral, Nightly  enoxaparin, 40 mg, Subcutaneous, Q24H  escitalopram, 10 mg, Oral, Daily  influenza vaccine, 0.5 mL, Intramuscular, Once  lisinopril, 20 mg, Oral, Q24H  metoprolol succinate XL, 25 mg, Oral, Q24H  multivitamin, 1 tablet, Oral, Daily       ----------------------------------------------------------------------------------------------------------------------  Vital Signs:  Temp:  [97.9 °F (36.6 °C)-98.5 °F (36.9 °C)] 97.9 °F (36.6 °C)  Heart Rate:  [69-73] 73  Resp:  [18] 18  BP: (114-152)/(64-73) 152/73  SpO2:  [94 %] 94 %  on   ;   Device (Oxygen Therapy): room air  Body mass index is 24.67 kg/m².    Wt Readings from Last 3 Encounters:   12/15/21 51.7 kg (114 lb)     Intake & Output (last 3 days)        0701  12/15 0700 12/15 07 07 07 07 07 07    P.O.  360 7180 480    Total Intake(mL/kg)  360 (7) 1620 (31.3) 480 (9.3)    Net  +360 +1620 +480            Urine Unmeasured Occurrence  4 x 10 x 1 x    Stool Unmeasured Occurrence   1 x 1 x        Diet Regular  ----------------------------------------------------------------------------------------------------------------------  Physical  exam:  Constitutional: Elderly female no distress   HEENT: Normocephalic atraumatic  Neck: Supple   Cardiovascular: Regular rate and rhythm  Pulmonary/Chest: Clear to auscultation  Abdominal: Positive bowel sounds soft.   Musculoskeletal: Status post right hip repair  Neurological: No focal deficits  Skin: No rash  Peripheral vascular:  Genitourinary:  ----------------------------------------------------------------------------------------------------------------------    Last echocardiogram:    ----------------------------------------------------------------------------------------------------------------------  Results from last 7 days   Lab Units 12/16/21  0252   WBC 10*3/mm3 8.94   HEMOGLOBIN g/dL 9.0*   HEMATOCRIT % 28.1*   MCV fL 94.9   MCHC g/dL 32.0   PLATELETS 10*3/mm3 256         Results from last 7 days   Lab Units 12/16/21  0252   SODIUM mmol/L 138   POTASSIUM mmol/L 4.0   CHLORIDE mmol/L 106   CO2 mmol/L 20.0*   BUN mg/dL 25*   CREATININE mg/dL 1.00   EGFR IF NONAFRICN AM mL/min/1.73 54*   CALCIUM mg/dL 8.7   GLUCOSE mg/dL 106*   Estimated Creatinine Clearance: 37.8 mL/min (by C-G formula based on SCr of 1 mg/dL).  No results found for: AMMONIA              No results found for: HGBA1C, POCGLU  No results found for: TSH, FREET4  No results found for: PREGTESTUR, PREGSERUM, HCG, HCGQUANT  Pain Management Panel    There is no flowsheet data to display.       Brief Urine Lab Results     None        No results found for: BLOODCX      No results found for: URINECX  No results found for: WOUNDCX  No results found for: STOOLCX        I have personally looked at the labs and they are summarized above.  ----------------------------------------------------------------------------------------------------------------------  Detailed radiology reports for the last 24 hours:    Imaging Results (Last 24 Hours)     ** No results found for the last 24 hours. **        Final impressions for the last 30 days of radiology  reports:    No radiology results for the last 30 days.  I have personally looked at the radiology images and read the final radiology report.    Assessment & Plan    Status post right hip fracture with hemiarthroplasty--patient requiring minimum assistance for up with transfers; moderate to maximum assistance for bathing; minimum assistance for upper body dressing; dependent to maximum assistance for lower body dressing; set up for grooming; dependent for toileting.  Ambulated 60 feet and 70 feet yesterday with a front wheel walker    CAD--history of CABG and stent placement.  Stent asymptomatic.  Continue medical management    Hypertension continue current treatment.  Fair control    Dementia continue Aricept 5 mg daily    VTE Prophylaxis:   Mechanical Order History:     None      Pharmalogical Order History:      Ordered     Dose Route Frequency Stop    12/15/21 1445  enoxaparin (LOVENOX) syringe 40 mg         40 mg SC Every 24 Hours 01/04/22 1754                    Dewey Campos MD  [facility]  12/17/21  16:12 EST

## 2021-12-17 NOTE — PROGRESS NOTES
Patient Assessment Instrument  Quality Indicators - Admission    Section B. Hearing, Speech Vision      Section C. Cognitive Patterns      Section QQ5795. Prior Functioning      Section OR7599. Prior Device Use  Patient does not use manual or motorized wheelchair or scooter, mechanical lift,  walker, or an orthotic/prosthesis.    Section RX6927. Self Care Performance      Section ES7273. Self Care Discharge Goals      Section FU3028. Mobility Performance      Section FB9640. Mobility Discharge Goals      Section H. Bladder and Bowel      Section I. Active Diagnosis      Section J. Health Conditions      Section K. Swallowing/Nutritional Status      Section M. Skin Conditions      Section N. Medication      Section O. Special Treatments, Procedures, and Programs      OPTIONAL BRANCH FOR TRACKING FALLS  Fall(s) During Shift:    Signed by: TROY Carvajal

## 2021-12-17 NOTE — PLAN OF CARE
Goal Outcome Evaluation:           Progress: improving  Outcome Summary: VSS. Pt remains alert, with periods of confusion. Pt resting in bed with no complaints. Will continue to monitor.

## 2021-12-17 NOTE — PROGRESS NOTES
Case Management  Inpatient Rehabilitation Plan of Care and Discharge Plan Note    Rehabilitation Diagnosis:  right hip hemiarthroplasty  Date of Onset:  12-10-21    Medical Summary:  right hip hemiarthroplasty, right displaced femoral neck fx    Plan of Care      Expected Intensity:  Average of 3 hours of therapy 5 days/week.  Interdisciplinary Team:  Interdisciplinary Team: Medical Supervision and 24 Hour Rehabilitation Nursing.,  Physical Therapy:, Occupational Therapy:, Social Work, Therapeutic Recreation.  Physical Therapy Intensity/Duration: PT 1.5 hours per day/5 days per week  Occupational Therapy Intensity/Duration: OT 1.5 hours per day/5 days per week  Estimated Length of Stay/Anticipated Discharge Date: 14 days  Anticipated Discharge Destination:  Anticipated discharge destination from inpatient rehabilitation is community  discharge with assistance. Pt plans to return home with ex-daughter-in-law  staying with her.      Based on the patient's medical and functional status, their prognosis and  expected level of functional improvement is:  good    Signed by: TROY Carvajal

## 2021-12-17 NOTE — SIGNIFICANT NOTE
12/16/21 1630   Living Environment   Lives With alone   Current Living Arrangements home/apartment/condo   Primary Care Provided by self   Provides Primary Care For no one   Family Caregiver if Needed other (see comments)  (Ex daughter-in-law Magaly Sneed will stay with pt at discharge.)   Quality of Family Relationships supportive   Able to Return to Prior Arrangements yes   Living Arrangement Comments SS spoke to pt and son Jose R Sneed.  Pt is  and lives alone in a low-income apartment on the ground level.  Pt has three children:  Yanique Sneed, Jose R Sneed and Timur Sneed who live in [place].  Daughter Yanique has health issues.  Pt receives Social Security income, shelter pension, SNAP benefits, and HUDD housing.  PCP is Dr. Rosario Cates,  Pt uses [facility].  Pt does not have POA or advance directive.  Pt was independent with mobility and ADL's prior to admission.   Resource/Environmental Concerns   Resource/Environmental Concerns none   Transportation Concerns car, none   Transition Planning   Patient/Family Anticipates Transition to home with family   Patient/Family Anticipated Services at Transition durable medical equipment; home health care   Transportation Anticipated family or friend will provide   Discharge Needs Assessment (IRF)   Current Outpatient/Agency/Support Group   (Pt did not receive home health or outpatient therapy prior to admission.)   Concerns to be Addressed adjustment to diagnosis/illness   Equipment Currently Used at Home rollator; cane, quad; shower chair; bp cuff   Current Discharge Risk lives alone   Discharge Coordination/Progress Pt plans to return to her apartment at discharge with ex daughter-in-law Magaly Sneed staying with her to assist with caregiving needs.  Team conference will be held on 12-21-21.  SS will follow and assist with discharge planning.

## 2021-12-17 NOTE — THERAPY TREATMENT NOTE
Inpatient Rehabilitation - Occupational Therapy Treatment Note     [facility]     Patient Name: Mellisa Sneed  : 1945  MRN: [id number]    Today's Date: 2021                 Admit Date: 12/15/2021       No diagnosis found.    Patient Active Problem List   Diagnosis   • S/P right hip fracture       Past Medical History:   Diagnosis Date   • Arthritis    • CHF (congestive heart failure) (HCC)    • Hypertension        Past Surgical History:   Procedure Laterality Date   • CARDIAC SURGERY     • FRACTURE SURGERY               IRF OT ASSESSMENT FLOWSHEET (last 12 hours)     IRF OT Evaluation and Treatment     Row Name 21 1316          OT Time and Intention    Document Type daily treatment  -TM     Mode of Treatment occupational therapy  -TM     Patient Effort adequate  -TM     Symptoms Noted During/After Treatment none  -TM     Row Name 21 1316          General Information    General Observations of Patient Pt agreeable for therapy.  -TM     Existing Precautions/Restrictions fall; hip  -TM     Row Name 21 1316          Cognition/Psychosocial    Orientation Status (Cognition) oriented to; person; situation; verbal cues/prompts needed for orientation; other (see comments)  confusion at times noted  -TM     Follows Commands (Cognition) follows one-step commands; verbal cues/prompting required; repetition of directions required  -TM     Row Name 21 1316          Transfers    Bed-Chair [place] (Transfers) minimum assist (75% patient effort); verbal cues  -TM     Chair-Bed [place] (Transfers) minimum assist (75% patient effort); verbal cues  -TM     Assistive Device (Bed-Chair Transfers) wheelchair; walker, front-wheeled  -TM     [place] Level (Toilet Transfer) minimum assist (75% patient effort); verbal cues  -TM     Assistive Device (Toilet Transfer) raised toilet seat; grab bars/safety frame; wheelchair  -TM     Row Name 21 1316          Chair-Bed Transfer    Assistive  Device (Chair-Bed Transfers) wheelchair  -TM     Row Name 12/17/21 1316          Toilet Transfer    Type (Toilet Transfer) stand pivot/stand step  -TM     Row Name 12/17/21 1316          Motor Skills    Motor Control/Coordination Interventions therapeutic exercise/ROM; gross motor coordination activities; fine motor manipulation/dexterity activities; other (see comments)  BUE ther ex/act, GMC/FMC  -TM     Row Name 12/17/21 1316          Bathing    [place] Level (Bathing) moderate assist (50% patient effort); verbal cues  -TM     Position (Bathing) edge of bed sitting  -TM     Row Name 12/17/21 1316          Upper Body Dressing    [place] Level (Upper Body Dressing) minimum assist (75% or more patient effort); verbal cues  -TM     Position (Upper Body Dressing) edge of bed sitting  -TM     Row Name 12/17/21 1316          Lower Body Dressing    [place] Level (Lower Body Dressing) maximum assist (25% patient effort); verbal cues  -TM     Position (Lower Body Dressing) edge of bed sitting  -TM     Row Name 12/17/21 1316          Grooming    [place] Level (Grooming) set up  -TM     Position (Grooming) supported sitting  -TM     Row Name 12/17/21 1316          Toileting    [place] Level (Toileting) dependent (less than 25% patient effort); verbal cues  -TM     Assistive Device Use (Toileting) raised toilet seat; grab bar/safety frame  -TM     Position (Toileting) supported sitting  -TM           User Key  (r) = Recorded By, (t) = Taken By, (c) = Cosigned By    Initials Name Effective Dates    Sherry Reagan OT 06/16/21 -                  Occupational Therapy Education                 Title: PT OT SLP Therapies (Done)     Topic: Occupational Therapy (Done)     Point: ADL training (Done)     Description:   Instruct learner(s) on proper safety adaptation and remediation techniques during self care or transfers.   Instruct in proper use of assistive devices.              Learning Progress  Summary           Patient Acceptance, E,D, VU,NR by TM at 12/17/2021 1313    Acceptance, E,D, VU,NR by  at 12/16/2021 1522    Acceptance, E,D, VU,NR by  at 12/16/2021 1126    Acceptance, E,D, VU,NR by TM at 12/16/2021 1126                   Point: Precautions (Done)     Description:   Instruct learner(s) on prescribed precautions during self-care and functional transfers.              Learning Progress Summary           Patient Acceptance, E,D, VU,NR by TM at 12/17/2021 1313    Acceptance, E,D, VU,NR by  at 12/16/2021 1522    Acceptance, E,D, VU,NR by TM at 12/16/2021 1126    Acceptance, E,D, VU,NR by  at 12/16/2021 1126                               User Key     Initials Effective Dates Name Provider Type [facility] 06/16/21 -  Khalida Glover OT Occupational Therapist OT     06/16/21 -  Sherry Varela OT Occupational Therapist OT                    OT Recommendation and Plan    Planned Therapy Interventions (OT): activity tolerance training, adaptive equipment training, BADL retraining, IADL retraining, ROM/therapeutic exercise, strengthening exercise, transfer/mobility retraining, occupation/activity based interventions, patient/caregiver education/training                    Time Calculation:      Time Calculation- OT     Row Name 12/17/21 1315 12/17/21 1314          Time Calculation- OT    OT Start Time 1235  -TM 0915  -TM     OT Stop Time 1330  -TM 1000  -TM     OT Time Calculation (min) 55 min  -TM 45 min  -TM     Total Timed Code Minutes- OT 55 minute(s)  -TM 45 minute(s)  -TM     OT Non-Billable Time (min) -- 15 min  -TM           User Key  (r) = Recorded By, (t) = Taken By, (c) = Cosigned By    Initials Name Provider Type     Sherry Varela OT Occupational Therapist              Therapy Charges for Today     Code Description Service Date Service Provider Modifiers Qty    94358815070  OT EVAL HIGH COMPLEXITY 3 12/16/2021 Sherry Varela OT GO 1    [id number]   OT SELF CARE/MGMT/TRAIN EA 15 MIN 12/17/2021 Sherry Varela, OT GO 3    [id number] HC OT THERAPEUTIC ACT EA 15 MIN 12/17/2021 Sherry Varela, OT GO 3    55768202171 HC OT THER PROC EA 15 MIN 12/17/2021 Sherry Varela, OT GO 1                   Sherry Varela OT  12/17/2021

## 2021-12-17 NOTE — PROGRESS NOTES
Rehabilitation Nursing  Inpatient Rehabilitation Plan of Care Note    Plan of Care  Copy from [facility]    Pain Management (Active)  Current Status (12/15/2021 2:25:00 PM): Potential for pain  Weekly Goal: No pain this week  Discharge Goal: No pain    Safety    Potential for Injury (Active)  Current Status (12/15/2021 2:25:00 PM): At risk for injury  Weekly Goal: No injury this week  Discharge Goal: No injury    Signed by: Naomy Crews Nurse

## 2021-12-17 NOTE — PLAN OF CARE
Goal Outcome Evaluation:  Plan of Care Reviewed With: patient           Outcome Summary: pt resting in bed. VSS no acute issues noted at this time. will continue with plan of care.

## 2021-12-17 NOTE — PROGRESS NOTES
Occupational Therapy: Individual: 100 minutes.    Physical Therapy:    Speech Language Pathology:    Signed by: Sherry Varela, Occupational Therapist

## 2021-12-18 PROCEDURE — 97530 THERAPEUTIC ACTIVITIES: CPT | Performed by: OCCUPATIONAL THERAPIST

## 2021-12-18 PROCEDURE — 25010000002 ENOXAPARIN PER 10 MG: Performed by: FAMILY MEDICINE

## 2021-12-18 PROCEDURE — 97116 GAIT TRAINING THERAPY: CPT

## 2021-12-18 PROCEDURE — 97110 THERAPEUTIC EXERCISES: CPT

## 2021-12-18 PROCEDURE — 97530 THERAPEUTIC ACTIVITIES: CPT

## 2021-12-18 PROCEDURE — 97535 SELF CARE MNGMENT TRAINING: CPT | Performed by: OCCUPATIONAL THERAPIST

## 2021-12-18 PROCEDURE — 97110 THERAPEUTIC EXERCISES: CPT | Performed by: OCCUPATIONAL THERAPIST

## 2021-12-18 PROCEDURE — 99231 SBSQ HOSP IP/OBS SF/LOW 25: CPT | Performed by: FAMILY MEDICINE

## 2021-12-18 RX ADMIN — ENOXAPARIN SODIUM 40 MG: 40 INJECTION SUBCUTANEOUS at 17:14

## 2021-12-18 RX ADMIN — AMLODIPINE BESYLATE 5 MG: 5 TABLET ORAL at 09:26

## 2021-12-18 RX ADMIN — ATORVASTATIN CALCIUM 20 MG: 20 TABLET, FILM COATED ORAL at 20:18

## 2021-12-18 RX ADMIN — Medication 1 TABLET: at 09:26

## 2021-12-18 RX ADMIN — METOPROLOL SUCCINATE 25 MG: 25 TABLET, EXTENDED RELEASE ORAL at 09:26

## 2021-12-18 RX ADMIN — CETIRIZINE HYDROCHLORIDE 5 MG: 10 TABLET, FILM COATED ORAL at 09:26

## 2021-12-18 RX ADMIN — DONEPEZIL HYDROCHLORIDE 5 MG: 5 TABLET, FILM COATED ORAL at 20:18

## 2021-12-18 RX ADMIN — ESCITALOPRAM 10 MG: 10 TABLET, FILM COATED ORAL at 09:26

## 2021-12-18 RX ADMIN — LISINOPRIL 20 MG: 10 TABLET ORAL at 09:26

## 2021-12-18 RX ADMIN — CLOPIDOGREL 75 MG: 75 TABLET, FILM COATED ORAL at 09:26

## 2021-12-18 NOTE — PROGRESS NOTES
Occupational Therapy:    Physical Therapy: Individual: 115 minutes.    Speech Language Pathology:    Signed by: Casie Hernandez PTA

## 2021-12-18 NOTE — PROGRESS NOTES
Rehabilitation Nursing  Inpatient Rehabilitation Plan of Care Note    Plan of Care  Copy from [facility]    Pain Management (Active)  Current Status (12/15/2021 2:25:00 PM): Potential for pain  Weekly Goal: No pain this week  Discharge Goal: No pain    Safety    Potential for Injury (Active)  Current Status (12/15/2021 2:25:00 PM): At risk for injury  Weekly Goal: No injury this week  Discharge Goal: No injury    Signed by: Doreen Pinto, Nurse

## 2021-12-18 NOTE — PROGRESS NOTES
Physical Medicine and Rehabilitation  Inpatient Rehabilitation Interdisciplinary Plan of Care    Demographics            Age: 76Y            Gender: Female    Admission Date: 12/15/2021 2:25:00 PM  Rehabilitation Diagnosis:  right hip hemiarthroplasty    Plan of Care  Anticipated Discharge Date/Estimated Length of Stay: 14 days  Anticipated Discharge Destination: Community discharge with assistance  Discharge Plan : Pt plans to return home with ex-daughter-in-law staying with  her.  Medical Necessity Expected Level Rationale: good  Intensity and Duration: an average of 3 hours/5 days per week  Medical Supervision and 24 Hour Rehab Nursing: x  Physical Therapy: x  PT Intensity/Duration: PT 1.5 hours per day/5 days per week  Occupational Therapy: x  OT Intensity/Duration: OT 1.5 hours per day/5 days per week  Social Work: x  Therapeutic Recreation: x  Updated (if changes indicated)  No changes to plan.    Based on the patient's medical and functional status, their prognosis and  expected level of functional improvement is: good    Interdisciplinary Problem/Goals/Status  Copy from [facility]    [PT] Bed/Chair/Wheelchair (Active)  Current Status (12/16/2021 12:00:00 AM): mod A  Weekly Goal: Sup  Discharge Goal: Sup    [PT] Walk (Active)  Current Status (12/16/2021 12:00:00 AM): amb 70' RW CGA  Weekly Goal: amb 300' RW Sup  Discharge Goal: amb 300' RW Sup    Self Care    [OT] Dressing (Lower) (Active)  Current Status (12/16/2021 10:55:00 AM): total/maxA  Weekly Goal: maxA  Discharge Goal: modA    Pain    [RN] Pain Management (Active)  Current Status (12/15/2021 2:25:00 PM): Potential for pain  Weekly Goal: No pain this week  Discharge Goal: No pain    Safety    [RN] Potential for Injury (Active)  Current Status (12/15/2021 2:25:00 PM): At risk for injury  Weekly Goal: No injury this week  Discharge Goal: No injury    Medical Problems    Comments:    Signed by: Dewey Campos, Physician

## 2021-12-18 NOTE — THERAPY TREATMENT NOTE
Inpatient Rehabilitation - Physical Therapy Treatment Note       MIAN Pizano     Patient Name: Mellisa Sneed  : 1945  MRN: [id number]    Today's Date: 2021                    Admit Date: 12/15/2021      Visit Dx:   No diagnosis found.    Patient Active Problem List   Diagnosis   • S/P right hip fracture       Past Medical History:   Diagnosis Date   • Arthritis    • CHF (congestive heart failure) (HCC)    • Hypertension        Past Surgical History:   Procedure Laterality Date   • CARDIAC SURGERY     • FRACTURE SURGERY         PT ASSESSMENT (last 12 hours)     IRF PT Evaluation and Treatment     Row Name 21          PT Time and Intention    Document Type daily treatment  -LL     Mode of Treatment individual therapy; physical therapy  -LL     Patient/Family/Caregiver Comments/Observations Patient agreeable to PT session  -     Row Name 21          General Information    Existing Precautions/Restrictions fall; right; hip  history of dementia  -LL     Row Name 21          Cognition/Psychosocial    Affect/Mental Status (Cognitive) --  alert and cooperative, confusion noted  -     Orientation Status (Cognition) oriented to; person; situation; verbal cues/prompts needed for orientation; other (see comments)  -     Follows Commands (Cognition) verbal cues/prompting required; physical/tactile prompts required  -LL     Personal Safety Interventions gait belt; nonskid shoes/slippers when out of bed; supervised activity  -LL     Row Name 21          Pain Scale: FACES Pre/Post-Treatment    Pain: FACES Scale, Pretreatment 6-->hurts even more  -LL     Posttreatment Pain Rating 6-->hurts even more  -LL     Row Name 21          Mobility    Left Lower Extremity (Weight-bearing Status) weight-bearing as tolerated (WBAT)  -LL     Right Lower Extremity (Weight-bearing Status) weight-bearing as tolerated (WBAT)  -     Row Name 21          Transfers     Sit-Stand [place] (Transfers) minimum assist (75% patient effort); verbal cues; nonverbal cues (demo/gesture)  -LL     Stand-Sit [place] (Transfers) minimum assist (75% patient effort); verbal cues; nonverbal cues (demo/gesture)  -LL     [place] Level (Toilet Transfer) minimum assist (75% patient effort); verbal cues  -LL     Assistive Device (Toilet Transfer) raised toilet seat; grab bars/safety frame; wheelchair  -LL     Row Name 12/17/21 1900          Sit-Stand Transfer    Assistive Device (Sit-Stand Transfers) walker, front-wheeled  -LL     Row Name 12/17/21 1900          Stand-Sit Transfer    Assistive Device (Stand-Sit Transfers) walker, front-wheeled  -LL     Row Name 12/17/21 1900          Toilet Transfer    Type (Toilet Transfer) stand pivot/stand step  -LL     Row Name 12/17/21 1900          Gait/Stairs (Locomotion)    [place] Level (Gait) contact guard; verbal cues; nonverbal cues (demo/gesture)  -LL     Assistive Device (Gait) walker, front-wheeled  -     Distance in Feet (Gait) 70' x 2  -LL     Pattern (Gait) step-to  -LL     Deviations/Abnormal Patterns (Gait) antalgic; luis decreased; gait speed decreased; stride length decreased  -LL     Bilateral Gait Deviations forward flexed posture; weight shift ability decreased  -LL     Row Name 12/17/21 1900          Safety Issues, Functional Mobility    Impairments Affecting Function (Mobility) balance; cognition; endurance/activity tolerance; pain; range of motion (ROM); strength  -LL     Row Name 12/17/21 1900          Balance    Comment, Balance unsupported sitting bean bag toss  -     Row Name 12/17/21 1900          Motor Skills    Therapeutic Exercise ankle; hip; knee  -     Row Name 12/17/21 1900          Hip (Therapeutic Exercise)    Hip (Therapeutic Exercise) strengthening exercise  -LL     Hip Strengthening (Therapeutic Exercise) bilateral; flexion; extension; aBduction; aDduction; marching while seated; sitting;  resistance band; red  -LL     Row Name 12/17/21 1900          Knee (Therapeutic Exercise)    Knee (Therapeutic Exercise) strengthening exercise  -LL     Knee Strengthening (Therapeutic Exercise) bilateral; flexion; extension; marching while seated; LAQ (long arc quad); hamstring curls; sitting; resistance band; red  -LL     Row Name 12/17/21 1900          Ankle (Therapeutic Exercise)    Ankle (Therapeutic Exercise) strengthening exercise  -LL     Ankle Strengthening (Therapeutic Exercise) bilateral; dorsiflexion; plantarflexion; sitting  -LL     Row Name 12/17/21 1900          IRF PT Goals    Bed Mobility Goal Selection (PT-IRF) bed mobility, PT goal 1  -LL     Transfer Goal Selection (PT-IRF) transfers, PT goal 1  -LL     Gait (Walking Locomotion) Goal Selection (PT-IRF) gait, PT goal 1  -LL     Row Name 12/17/21 1900          Bed Mobility Goal 1 (PT-IRF)    Activity/Assistive Device (Bed Mobility Goal 1, PT-IRF) sit to supine/supine to sit  -LL     [place] Level (Bed Mobility Goal 1, PT-IRF) independent  -LL     Time Frame (Bed Mobility Goal 1, PT-IRF) by discharge  -     Row Name 12/17/21 1900          Transfer Goal 1 (PT-IRF)    Activity/Assistive Device (Transfer Goal 1, PT-IRF) sit-to-stand/stand-to-sit; bed-to-chair/chair-to-bed  -LL     [place] Level (Transfer Goal 1, PT-IRF) supervision required  -LL     Time Frame (Transfer Goal 1, PT-IRF) by discharge  -     Row Name 12/17/21 1900          Gait/Walking Locomotion Goal 1 (PT-IRF)    Activity/Assistive Device (Gait/Walking Locomotion Goal 1, PT-IRF) walker, rolling  -LL     Gait/Walking Locomotion Distance Goal 1 (PT-IRF) 300'  -LL     [place] Level (Gait/Walking Locomotion Goal 1, PT-IRF) supervision required  -LL     Time Frame (Gait/Walking Locomotion Goal 1, PT-IRF) by discharge  -     Row Name 12/17/21 1900          Positioning and Restraints    Pre-Treatment Position --  WC with OT  -LL     Post Treatment Position wheelchair  -LL      In Wheelchair sitting; call light within reach; encouraged to call for assist; legs elevated  -LL     Row Name 12/17/21 1900          Therapy Assessment/Plan (PT)    Patient's Goals For Discharge return home  -LL     Row Name 12/17/21 1900          Therapy Assessment/Plan (PT)    Rehab Potential/Prognosis (PT) adequate, monitor progress closely  -LL     Frequency of Treatment (PT) 5 times per week  -LL     Estimated Duration of Therapy (PT) 2 weeks  -LL     Problem List (PT) balance; cognition; mobility; range of motion (ROM); strength; pain  -LL     Activity Limitations Related to Problem List (PT) unable to ambulate safely; unable to transfer safely  -LL     Row Name 12/17/21 1900          Therapy Plan Review/Discharge Plan (PT)    Anticipated Equipment Needs at Discharge (PT Eval) --  tbd  -LL     Expected Discharge Disposition (PT Eval) home with home health care; home with caregiver  -           User Key  (r) = Recorded By, (t) = Taken By, (c) = Cosigned By    Initials Name Provider Type     Casie Hernandez PTA Physical Therapy Assistant              Wound 12/15/21 1530 Right anterior greater trochanter (Active)   Dressing Appearance dry; intact 12/17/21 0903   Closure RAMON 12/17/21 0903   Base dressing in place, unable to visualize 12/17/21 0903   Periwound edematous; ecchymotic 12/17/21 0903     Physical Therapy Education                 Title: PT OT SLP Therapies (Done)     Topic: Physical Therapy (Done)     Point: Mobility training (Done)     Learning Progress Summary           Patient Acceptance, E,D, VU,NR by LL at 12/17/2021 1950    Acceptance, E, VU,NR by LB at 12/16/2021 1401                   Point: Home exercise program (Done)     Learning Progress Summary           Patient Acceptance, E,D, VU,NR by LL at 12/17/2021 1950    Acceptance, E, VU,NR by LB at 12/16/2021 1401                   Point: Body mechanics (Done)     Learning Progress Summary           Patient Acceptance, E,D, VU,NR by LL at  12/17/2021 1950    Acceptance, E, VU,NR by LB at 12/16/2021 1401                   Point: Precautions (Done)     Learning Progress Summary           Patient Acceptance, E,D, VU,NR by LL at 12/17/2021 1950    Acceptance, E, VU,NR by LB at 12/16/2021 1401                               User Key     Initials Effective Dates Name Provider Type Discipline    LB 06/16/21 -  Juanita Perez, PT Physical Therapist PT     05/02/16 -  Casie Hernandez PTA Physical Therapy Assistant PT                PT Recommendation and Plan    Frequency of Treatment (PT): 5 times per week  Anticipated Equipment Needs at Discharge (PT Eval):  (tbd)                  Time Calculation:      PT Charges     Row Name 12/17/21 1950             Time Calculation    Start Time 1000  -LL      Stop Time 1130  -LL      Time Calculation (min) 90 min  -LL      PT Received On 12/17/21  -LL              Time Calculation- PT    Total Timed Code Minutes- PT 90 minute(s)  -LL            User Key  (r) = Recorded By, (t) = Taken By, (c) = Cosigned By    Initials Name Provider Type     Casie Hernandez PTA Physical Therapy Assistant                Therapy Charges for Today     Code Description Service Date Service Provider Modifiers Qty    99523285659 HC GAIT TRAINING EA 15 MIN 12/17/2021 Casie Hernandez PTA GP 2    73117558188 HC PT THERAPEUTIC ACT EA 15 MIN 12/17/2021 Casie Hernandez PTA GP 1    63800502901 HC PT THER PROC EA 15 MIN 12/17/2021 Casie Hernandez PTA GP 3                   Madhav Hernandez PTA  12/17/2021

## 2021-12-18 NOTE — THERAPY TREATMENT NOTE
Inpatient Rehabilitation - Physical Therapy Treatment Note       MIAN Pizano     Patient Name: Mellisa Sneed  : 1945  MRN: [id number]    Today's Date: 2021                    Admit Date: 12/15/2021      Visit Dx:   No diagnosis found.    Patient Active Problem List   Diagnosis   • S/P right hip fracture       Past Medical History:   Diagnosis Date   • Arthritis    • CHF (congestive heart failure) (HCC)    • Hypertension        Past Surgical History:   Procedure Laterality Date   • CARDIAC SURGERY     • FRACTURE SURGERY         PT ASSESSMENT (last 12 hours)     IRF PT Evaluation and Treatment     Row Name 21 1251          PT Time and Intention    Document Type daily treatment  -LL     Mode of Treatment individual therapy; physical therapy  -LL     Patient/Family/Caregiver Comments/Observations Patient agreeable to PT session  -     Row Name 21 1251          General Information    Existing Precautions/Restrictions fall; right; hip  history of dementia  -     Row Name 21 1251          Cognition/Psychosocial    Affect/Mental Status (Cognitive) --  alert and cooperative, confusion noted  -     Orientation Status (Cognition) oriented to; person; situation; verbal cues/prompts needed for orientation; other (see comments)  -     Follows Commands (Cognition) verbal cues/prompting required; physical/tactile prompts required  -     Row Name 21 1251          Pain Scale: FACES Pre/Post-Treatment    Pain: FACES Scale, Pretreatment 6-->hurts even more  -LL     Posttreatment Pain Rating 6-->hurts even more  -LL     Row Name 21 1251          Mobility    Left Lower Extremity (Weight-bearing Status) weight-bearing as tolerated (WBAT)  -LL     Right Lower Extremity (Weight-bearing Status) weight-bearing as tolerated (WBAT)  -     Row Name 21 1251          Bed Mobility    Supine-Sit [place] (Bed Mobility) minimum assist (75% patient effort); verbal cues; nonverbal cues  (demo/gesture)  -LL     Assistive Device (Bed Mobility) bed rails  -LL     Row Name 12/18/21 1251          Transfers    Bed-Chair [place] (Transfers) minimum assist (75% patient effort); verbal cues  -LL     Chair-Bed [place] (Transfers) minimum assist (75% patient effort); verbal cues  -LL     Assistive Device (Bed-Chair Transfers) wheelchair; walker, front-wheeled  -LL     Sit-Stand [place] (Transfers) minimum assist (75% patient effort); verbal cues; nonverbal cues (demo/gesture)  -LL     Stand-Sit [place] (Transfers) minimum assist (75% patient effort); verbal cues; nonverbal cues (demo/gesture)  -LL     [place] Level (Toilet Transfer) minimum assist (75% patient effort); verbal cues  -LL     Assistive Device (Toilet Transfer) raised toilet seat; grab bars/safety frame; wheelchair  -LL     Row Name 12/18/21 1251          Chair-Bed Transfer    Assistive Device (Chair-Bed Transfers) wheelchair  -LL     Row Name 12/18/21 1251          Sit-Stand Transfer    Assistive Device (Sit-Stand Transfers) walker, front-wheeled  -LL     Row Name 12/18/21 1251          Stand-Sit Transfer    Assistive Device (Stand-Sit Transfers) walker, front-wheeled  -LL     Row Name 12/18/21 1251          Toilet Transfer    Type (Toilet Transfer) stand pivot/stand step  x 2  -LL     Row Name 12/18/21 1251          Gait/Stairs (Locomotion)    [place] Level (Gait) contact guard; verbal cues; nonverbal cues (demo/gesture)  -LL     Assistive Device (Gait) walker, front-wheeled  -LL     Distance in Feet (Gait) 70' x 2  -LL     Pattern (Gait) step-to  -LL     Deviations/Abnormal Patterns (Gait) antalgic; luis decreased; gait speed decreased; stride length decreased  -LL     Bilateral Gait Deviations forward flexed posture; weight shift ability decreased  -LL     Row Name 12/18/21 1251          Safety Issues, Functional Mobility    Impairments Affecting Function (Mobility) balance; cognition; endurance/activity  tolerance; pain; range of motion (ROM); strength  -LL     Row Name 12/18/21 1251          Balance    Comment, Balance unsupported sitting bean bag toss reaching outside [place]  -LL     Row Name 12/18/21 1251          Hip (Therapeutic Exercise)    Hip Strengthening (Therapeutic Exercise) bilateral; flexion; extension; aBduction; aDduction; marching while seated; sitting; resistance band; red  -LL     Row Name 12/18/21 1251          Knee (Therapeutic Exercise)    Knee Strengthening (Therapeutic Exercise) bilateral; flexion; extension; marching while seated; LAQ (long arc quad); hamstring curls; sitting; resistance band; red  -LL     Row Name 12/18/21 1251          Ankle (Therapeutic Exercise)    Ankle Strengthening (Therapeutic Exercise) bilateral; dorsiflexion; plantarflexion; sitting  -LL     Row Name 12/18/21 1251          IRF PT Goals    Bed Mobility Goal Selection (PT-IRF) bed mobility, PT goal 1  -LL     Transfer Goal Selection (PT-IRF) transfers, PT goal 1  -LL     Gait (Walking Locomotion) Goal Selection (PT-IRF) gait, PT goal 1  -LL     Row Name 12/18/21 1251          Bed Mobility Goal 1 (PT-IRF)    Activity/Assistive Device (Bed Mobility Goal 1, PT-IRF) sit to supine/supine to sit  -LL     [place] Level (Bed Mobility Goal 1, PT-IRF) independent  -LL     Time Frame (Bed Mobility Goal 1, PT-IRF) by discharge  -LL     Row Name 12/18/21 1251          Transfer Goal 1 (PT-IRF)    Activity/Assistive Device (Transfer Goal 1, PT-IRF) sit-to-stand/stand-to-sit; bed-to-chair/chair-to-bed  -LL     [place] Level (Transfer Goal 1, PT-IRF) supervision required  -LL     Time Frame (Transfer Goal 1, PT-IRF) by discharge  -LL     Row Name 12/18/21 1251          Gait/Walking Locomotion Goal 1 (PT-IRF)    Activity/Assistive Device (Gait/Walking Locomotion Goal 1, PT-IRF) walker, rolling  -LL     Gait/Walking Locomotion Distance Goal 1 (PT-IRF) 300'  -LL     [place] Level (Gait/Walking Locomotion Goal 1, PT-IRF)  supervision required  -     Time Frame (Gait/Walking Locomotion Goal 1, PT-IRF) by discharge  -     Row Name 12/18/21 1251          Positioning and Restraints    Pre-Treatment Position in bed  -LL     Post Treatment Position bed  -LL     In Bed supine; call light within reach; encouraged to call for assist; exit alarm on; side rails up x2; legs elevated  -LL     Row Name 12/18/21 1251          Therapy Assessment/Plan (PT)    Patient's Goals For Discharge return home  -     Row Name 12/18/21 1251          Therapy Assessment/Plan (PT)    Rehab Potential/Prognosis (PT) adequate, monitor progress closely  -LL     Frequency of Treatment (PT) 5 times per week  -LL     Estimated Duration of Therapy (PT) 2 weeks  -     Problem List (PT) balance; cognition; mobility; range of motion (ROM); strength; pain  -LL     Activity Limitations Related to Problem List (PT) unable to ambulate safely; unable to transfer safely  -     Row Name 12/18/21 1251          Therapy Plan Review/Discharge Plan (PT)    Anticipated Equipment Needs at Discharge (PT Eval) --  tbd  -LL     Expected Discharge Disposition (PT Eval) home with home health care; home with caregiver  -           User Key  (r) = Recorded By, (t) = Taken By, (c) = Cosigned By    Initials Name Provider Type     Casie Hernandez PTA Physical Therapy Assistant              Wound 12/15/21 1530 Right anterior greater trochanter (Active)   Dressing Appearance dry; intact 12/18/21 1019   Closure RAMON 12/18/21 1019   Base dressing in place, unable to visualize 12/17/21 1959     Physical Therapy Education                 Title: PT OT SLP Therapies (In Progress)     Topic: Physical Therapy (Done)     Point: Mobility training (Done)     Learning Progress Summary           Patient Acceptance, E,D, VU,NR by LL at 12/18/2021 1255    Acceptance, E,D, VU,NR by LL at 12/17/2021 1950    Acceptance, E, VU,NR by LB at 12/16/2021 1401                   Point: Home exercise program  (Done)     Learning Progress Summary           Patient Acceptance, E,D, VU,NR by LL at 12/18/2021 1255    Acceptance, E,D, VU,NR by LL at 12/17/2021 1950    Acceptance, E, VU,NR by LB at 12/16/2021 1401                   Point: Body mechanics (Done)     Learning Progress Summary           Patient Acceptance, E,D, VU,NR by LL at 12/18/2021 1255    Acceptance, E,D, VU,NR by LL at 12/17/2021 1950    Acceptance, E, VU,NR by LB at 12/16/2021 1401                   Point: Precautions (Done)     Learning Progress Summary           Patient Acceptance, E,D, VU,NR by LL at 12/18/2021 1255    Acceptance, E,D, VU,NR by LL at 12/17/2021 1950    Acceptance, E, VU,NR by LB at 12/16/2021 1401                               User Key     Initials Effective Dates Name Provider Type Discipline     06/16/21 -  Juanita Perez, PT Physical Therapist PT     05/02/16 -  Casie Hernandez PTA Physical Therapy Assistant PT                PT Recommendation and Plan    Frequency of Treatment (PT): 5 times per week  Anticipated Equipment Needs at Discharge (PT Eval):  (tbd)                  Time Calculation:      PT Charges     Row Name 12/18/21 1256 12/18/21 1255          Time Calculation    Start Time 1000  -LL 0720  -LL     Stop Time 1045  -LL 0830  -LL     Time Calculation (min) 45 min  -LL 70 min  -LL     PT Received On -- 12/18/21  -LL            Time Calculation- PT    Total Timed Code Minutes- PT 45 minute(s)  -LL 70 minute(s)  -LL           User Key  (r) = Recorded By, (t) = Taken By, (c) = Cosigned By    Initials Name Provider Type    LL Casie Hernandez PTA Physical Therapy Assistant                Therapy Charges for Today     Code Description Service Date Service Provider Modifiers Qty    88478632657 HC GAIT TRAINING EA 15 MIN 12/17/2021 Casie Hernandez PTA GP 2    25398460637 HC PT THERAPEUTIC ACT EA 15 MIN 12/17/2021 Casie Hernandez PTA GP 1    49516691264 HC PT THER PROC EA 15 MIN 12/17/2021 Casie Hernandez PTA GP 3     05842890313 HC GAIT TRAINING EA 15 MIN 12/18/2021 Casie Hernandez, PTA GP 2    87820065259 HC PT THERAPEUTIC ACT EA 15 MIN 12/18/2021 Casie Hernandez, PTA GP 3    72121191027 HC PT THER PROC EA 15 MIN 12/18/2021 Casie Hernandez, PTA GP 3                   Casie. JAMARI Hernandez  12/18/2021

## 2021-12-18 NOTE — PLAN OF CARE
Goal Outcome Evaluation:  Plan of Care Reviewed With: patient        Progress: improving   Pt progressing with therapies, cont POC.

## 2021-12-18 NOTE — PROGRESS NOTES
[facility]  PROGRESS NOTE     Patient Identification:  Name:  Mellisa Sneed  Age:  76 y.o.  Sex:  female  :  1945  MRN:  [id number]  Visit Number:  [id number]  ROOM: [facility]     Primary Care Provider:  Rosario Cates MD    Length of stay in inpatient status:  3    Subjective     Chief Compliant:  No chief complaint on file.      History of Presenting Illness: 76-year-old female with history of CAD and CABG, coronary stents, hyperlipidemia, hypertension.  Patient recently treated for right hip fracture with right hemiarthroplasty.  Patient states that she is sore but is doing reasonably well at this time has no new complaints    Objective     Current Hospital Meds:amLODIPine, 5 mg, Oral, Q24H  atorvastatin, 20 mg, Oral, Nightly  cetirizine, 5 mg, Oral, Daily  clopidogrel, 75 mg, Oral, Daily  donepezil, 5 mg, Oral, Nightly  enoxaparin, 40 mg, Subcutaneous, Q24H  escitalopram, 10 mg, Oral, Daily  influenza vaccine, 0.5 mL, Intramuscular, Once  lisinopril, 20 mg, Oral, Q24H  metoprolol succinate XL, 25 mg, Oral, Q24H  multivitamin, 1 tablet, Oral, Daily       ----------------------------------------------------------------------------------------------------------------------  Vital Signs:  Temp:  [98.2 °F (36.8 °C)] 98.2 °F (36.8 °C)  Heart Rate:  [74-86] 86  Resp:  [16] 16  BP: (112-140)/(63-78) 140/78  SpO2:  [97 %] 97 %  on   ;   Device (Oxygen Therapy): room air  Body mass index is 24.67 kg/m².    Wt Readings from Last 3 Encounters:   12/15/21 51.7 kg (114 lb)     Intake & Output (last 3 days)       12/15 0701   0700  0701   0700  0701   0700  0701   0700    P.O. 360 1620 940 120    Total Intake(mL/kg) 360 (7) 1620 (31.3) 940 (18.2) 120 (2.3)    Net +360 +1620 +940 +120            Urine Unmeasured Occurrence 4 x 10 x 5 x 1 x    Stool Unmeasured Occurrence  1 x 2 x 1 x        Diet  Regular  ----------------------------------------------------------------------------------------------------------------------  Physical exam:  Constitutional:   No acute distress  HEENT: Normocephalic atraumatic  Neck: Supple   Cardiovascular: Regular rate and rhythm  Pulmonary/Chest: Clear to auscultation  Abdominal: Positive bowel sounds soft.   Musculoskeletal: Status post hip repair  Neurological: No focal deficits  Skin: No rash  Peripheral vascular:  Genitourinary:  ----------------------------------------------------------------------------------------------------------------------    Last echocardiogram:    ----------------------------------------------------------------------------------------------------------------------  Results from last 7 days   Lab Units 12/16/21  0252   WBC 10*3/mm3 8.94   HEMOGLOBIN g/dL 9.0*   HEMATOCRIT % 28.1*   MCV fL 94.9   MCHC g/dL 32.0   PLATELETS 10*3/mm3 256         Results from last 7 days   Lab Units 12/16/21  0252   SODIUM mmol/L 138   POTASSIUM mmol/L 4.0   CHLORIDE mmol/L 106   CO2 mmol/L 20.0*   BUN mg/dL 25*   CREATININE mg/dL 1.00   EGFR IF NONAFRICN AM mL/min/1.73 54*   CALCIUM mg/dL 8.7   GLUCOSE mg/dL 106*   Estimated Creatinine Clearance: 37.8 mL/min (by C-G formula based on SCr of 1 mg/dL).  No results found for: AMMONIA              No results found for: HGBA1C, POCGLU  No results found for: TSH, FREET4  No results found for: PREGTESTUR, PREGSERUM, HCG, HCGQUANT  Pain Management Panel    There is no flowsheet data to display.       Brief Urine Lab Results     None        No results found for: BLOODCX      No results found for: URINECX  No results found for: WOUNDCX  No results found for: STOOLCX        I have personally looked at the labs and they are summarized above.  ----------------------------------------------------------------------------------------------------------------------  Detailed radiology reports for the last 24 hours:    Imaging Results  (Last 24 Hours)     ** No results found for the last 24 hours. **        Final impressions for the last 30 days of radiology reports:    No radiology results for the last 30 days.  I have personally looked at the radiology images and read the final radiology report.    Assessment & Plan    Status post right hip fracture with hemiarthroplasty--patient worked with occupational therapy this morning on coordination; therapeutic exercise; functional endurance activities; motor control coordination interventions.  Require set up for grooming.  Patient requiring minimum assistance for up with transfers; ambulated 70 feet x 2 with front wheel walker.  Patient requiring minimum assistance for up with upper body dressing; moderate assistance for bathing; maximum assistance for lower body dressing; set up for grooming; dependent for toileting.    CAD with history of CABG and stent placement.  Patient asymptomatic.  Continue medical management    Hypertension--well-controlled    Dementia continue Aricept 5 mg daily    VTE Prophylaxis:   Mechanical Order History:     None      Pharmalogical Order History:      Ordered     Dose Route Frequency Stop    12/15/21 1445  enoxaparin (LOVENOX) syringe 40 mg         40 mg SC Every 24 Hours 01/04/22 1759                    Dewey Campos MD  [facility]  12/18/21  10:35 EST

## 2021-12-18 NOTE — PROGRESS NOTES
Occupational Therapy:    Physical Therapy: Individual: 90 minutes.    Speech Language Pathology:    Signed by: Casie Hernandez PTA

## 2021-12-18 NOTE — THERAPY TREATMENT NOTE
Inpatient Rehabilitation - Occupational Therapy Treatment Note    [facility] [place]     Patient Name: Mellisa Sneed  : 1945  MRN: [id number]    Today's Date: 2021                 Admit Date: 12/15/2021       No diagnosis found.    Patient Active Problem List   Diagnosis   • S/P right hip fracture       Past Medical History:   Diagnosis Date   • Arthritis    • CHF (congestive heart failure) (HCC)    • Hypertension        Past Surgical History:   Procedure Laterality Date   • CARDIAC SURGERY     • FRACTURE SURGERY               IRF OT ASSESSMENT FLOWSHEET (last 12 hours)     IRF OT Evaluation and Treatment     Row Name 21          OT Time and Intention    Document Type daily treatment  -AS     Mode of Treatment occupational therapy; individual therapy  -AS     Patient Effort adequate  -AS     Symptoms Noted During/After Treatment none  -AS     Row Name 21          Motor Skills    Motor Skills coordination; therapeutic exercise; functional endurance; motor control/coordination interventions  -AS     Motor Control/Coordination Interventions therapeutic exercise/ROM; fine motor manipulation/dexterity activities; gross motor coordination activities  -AS     Row Name 21          Grooming    [place] (Grooming) set up; verbal cues  -AS     Position (Grooming) supported sitting  -AS           User Key  (r) = Recorded By, (t) = Taken By, (c) = Cosigned By    Initials Name Effective Dates    AS Tabitha Juarez, OT 21 -                  Occupational Therapy Education                 Title: PT OT SLP Therapies (In Progress)     Topic: Occupational Therapy (In Progress)     Point: ADL training (In Progress)     Description:   Instruct learner(s) on proper safety adaptation and remediation techniques during self care or transfers.   Instruct in proper use of assistive devices.              Learning Progress Summary           Patient Acceptance, D,E, NR by AS at 2021 0950     Acceptance, E,D, VU,NR by TM at 12/17/2021 1313    Acceptance, E,D, VU,NR by  at 12/16/2021 1522    Acceptance, E,D, VU,NR by TM at 12/16/2021 1126    Acceptance, E,D, VU,NR by TM at 12/16/2021 1126                   Point: Precautions (In Progress)     Description:   Instruct learner(s) on prescribed precautions during self-care and functional transfers.              Learning Progress Summary           Patient Acceptance, D,E, NR by AS at 12/18/2021 0923    Acceptance, E,D, VU,NR by TM at 12/17/2021 1313    Acceptance, E,D, VU,NR by  at 12/16/2021 1522    Acceptance, E,D, VU,NR by TM at 12/16/2021 1126    Acceptance, E,D, VU,NR by TM at 12/16/2021 1126                               User Key     Initials Effective Dates Name Provider Type Discipline     06/16/21 -  Khalida Glover, OT Occupational Therapist OT     06/16/21 -  Sherry Varela OT Occupational Therapist OT    AS 06/16/21 -  Tabitha Juarez, OT Occupational Therapist OT                    OT Recommendation and Plan                         Time Calculation:      Time Calculation- OT     Row Name 12/18/21 0923             Time Calculation- OT    OT Start Time 0830  -AS      OT Stop Time 1000  -AS      OT Time Calculation (min) 90 min  -AS      OT Non-Billable Time (min) 15 min  -AS            User Key  (r) = Recorded By, (t) = Taken By, (c) = Cosigned By    Initials Name Provider Type    AS Tabitha Juarez, OT Occupational Therapist              Therapy Charges for Today     Code Description Service Date Service Provider Modifiers Qty    65218753746 HC OT THER PROC EA 15 MIN 12/18/2021 Tabitha Juarez, OT GO 2    82947106295 HC OT THERAPEUTIC ACT EA 15 MIN 12/18/2021 Tabitha Juarez, OT GO 3    74561599739 HC OT SELF CARE/MGMT/TRAIN EA 15 MIN 12/18/2021 Tabitha Juarez, OT GO 1                   Tabitha Juarez OT  12/18/2021

## 2021-12-18 NOTE — PROGRESS NOTES
Inpatient Rehabilitation Functional Measures Assessment    Functional Measures  SEAN Eating:  SEAN Grooming:  SENA Bathing:  SEAN Upper Body Dressing:  SEAN Lower Body Dressing:  SEAN Toileting:    SEAN Bladder Management  Level of Assistance:  Frequency/Number of Accidents this Shift:    SEAN Bowel Management  Level of Assistance:  Frequency/Number of Accidents this Shift:    SEAN Bed/Chair/Wheelchair Transfer:  SEAN Toilet Transfer:  SEAN Tub/Shower Transfer:    Previously Documented Mode of Locomotion at Discharge:  [facility] Expected Mode of Locomotion at Discharge:  [facility] Walk/Wheelchair:  [facility] Stairs:    [facility] Comprehension:  [facility] Expression:  [facility] Social Interaction:  [facility] Problem Solving:  SEAN Memory:    Therapy Mode Minutes  Occupational Therapy: Individual: 90 minutes.  Physical Therapy:  Speech Language Pathology:    Discharge Functional Goals:    Signed by: Tabitha Juarez, Occupational Therapist

## 2021-12-19 LAB
ANION GAP SERPL CALCULATED.3IONS-SCNC: 10.8 MMOL/L (ref 5–15)
BASOPHILS # BLD AUTO: 0.12 10*3/MM3 (ref 0–0.2)
BASOPHILS NFR BLD AUTO: 1 % (ref 0–1.5)
BUN SERPL-MCNC: 17 MG/DL (ref 8–23)
BUN/CREAT SERPL: 18.1 (ref 7–25)
CALCIUM SPEC-SCNC: 8.8 MG/DL (ref 8.6–10.5)
CHLORIDE SERPL-SCNC: 109 MMOL/L (ref 98–107)
CO2 SERPL-SCNC: 19.2 MMOL/L (ref 22–29)
CREAT SERPL-MCNC: 0.94 MG/DL (ref 0.57–1)
DEPRECATED RDW RBC AUTO: 49.6 FL (ref 37–54)
EOSINOPHIL # BLD AUTO: 0.38 10*3/MM3 (ref 0–0.4)
EOSINOPHIL NFR BLD AUTO: 3.1 % (ref 0.3–6.2)
ERYTHROCYTE [DISTWIDTH] IN BLOOD BY AUTOMATED COUNT: 14.7 % (ref 12.3–15.4)
GFR SERPL CREATININE-BSD FRML MDRD: 58 ML/MIN/1.73
GLUCOSE SERPL-MCNC: 112 MG/DL (ref 65–99)
HCT VFR BLD AUTO: 30.2 % (ref 34–46.6)
HGB BLD-MCNC: 9.8 G/DL (ref 12–15.9)
IMM GRANULOCYTES # BLD AUTO: 0.08 10*3/MM3 (ref 0–0.05)
IMM GRANULOCYTES NFR BLD AUTO: 0.7 % (ref 0–0.5)
LYMPHOCYTES # BLD AUTO: 2.38 10*3/MM3 (ref 0.7–3.1)
LYMPHOCYTES NFR BLD AUTO: 19.5 % (ref 19.6–45.3)
MCH RBC QN AUTO: 30.6 PG (ref 26.6–33)
MCHC RBC AUTO-ENTMCNC: 32.5 G/DL (ref 31.5–35.7)
MCV RBC AUTO: 94.4 FL (ref 79–97)
MONOCYTES # BLD AUTO: 1.38 10*3/MM3 (ref 0.1–0.9)
MONOCYTES NFR BLD AUTO: 11.3 % (ref 5–12)
NEUTROPHILS NFR BLD AUTO: 64.4 % (ref 42.7–76)
NEUTROPHILS NFR BLD AUTO: 7.86 10*3/MM3 (ref 1.7–7)
NRBC BLD AUTO-RTO: 0.2 /100 WBC (ref 0–0.2)
PLATELET # BLD AUTO: 399 10*3/MM3 (ref 140–450)
PMV BLD AUTO: 9.8 FL (ref 6–12)
POTASSIUM SERPL-SCNC: 3.7 MMOL/L (ref 3.5–5.2)
RBC # BLD AUTO: 3.2 10*6/MM3 (ref 3.77–5.28)
SODIUM SERPL-SCNC: 139 MMOL/L (ref 136–145)
WBC NRBC COR # BLD: 12.2 10*3/MM3 (ref 3.4–10.8)

## 2021-12-19 PROCEDURE — 80048 BASIC METABOLIC PNL TOTAL CA: CPT | Performed by: FAMILY MEDICINE

## 2021-12-19 PROCEDURE — 63710000001 ONDANSETRON PER 8 MG: Performed by: FAMILY MEDICINE

## 2021-12-19 PROCEDURE — 25010000002 ENOXAPARIN PER 10 MG: Performed by: FAMILY MEDICINE

## 2021-12-19 PROCEDURE — 85025 COMPLETE CBC W/AUTO DIFF WBC: CPT | Performed by: FAMILY MEDICINE

## 2021-12-19 PROCEDURE — 99231 SBSQ HOSP IP/OBS SF/LOW 25: CPT | Performed by: FAMILY MEDICINE

## 2021-12-19 RX ORDER — ONDANSETRON 4 MG/1
4 TABLET, FILM COATED ORAL EVERY 6 HOURS PRN
Status: DISCONTINUED | OUTPATIENT
Start: 2021-12-19 | End: 2021-12-29 | Stop reason: HOSPADM

## 2021-12-19 RX ORDER — AMLODIPINE BESYLATE 5 MG/1
5 TABLET ORAL ONCE
Status: COMPLETED | OUTPATIENT
Start: 2021-12-19 | End: 2021-12-19

## 2021-12-19 RX ORDER — LOPERAMIDE HYDROCHLORIDE 2 MG/1
2 CAPSULE ORAL 4 TIMES DAILY PRN
Status: DISCONTINUED | OUTPATIENT
Start: 2021-12-19 | End: 2021-12-21

## 2021-12-19 RX ADMIN — ENOXAPARIN SODIUM 40 MG: 40 INJECTION SUBCUTANEOUS at 17:58

## 2021-12-19 RX ADMIN — ESCITALOPRAM 10 MG: 10 TABLET, FILM COATED ORAL at 10:31

## 2021-12-19 RX ADMIN — CLOPIDOGREL 75 MG: 75 TABLET, FILM COATED ORAL at 10:31

## 2021-12-19 RX ADMIN — LISINOPRIL 20 MG: 10 TABLET ORAL at 10:31

## 2021-12-19 RX ADMIN — ATORVASTATIN CALCIUM 20 MG: 20 TABLET, FILM COATED ORAL at 20:11

## 2021-12-19 RX ADMIN — AMLODIPINE BESYLATE 5 MG: 5 TABLET ORAL at 21:16

## 2021-12-19 RX ADMIN — METOPROLOL SUCCINATE 25 MG: 25 TABLET, EXTENDED RELEASE ORAL at 10:30

## 2021-12-19 RX ADMIN — AMLODIPINE BESYLATE 5 MG: 5 TABLET ORAL at 10:31

## 2021-12-19 RX ADMIN — DONEPEZIL HYDROCHLORIDE 5 MG: 5 TABLET, FILM COATED ORAL at 20:11

## 2021-12-19 RX ADMIN — CETIRIZINE HYDROCHLORIDE 5 MG: 10 TABLET, FILM COATED ORAL at 10:30

## 2021-12-19 RX ADMIN — Medication 1 TABLET: at 10:30

## 2021-12-19 RX ADMIN — ONDANSETRON HYDROCHLORIDE 4 MG: 4 TABLET, FILM COATED ORAL at 10:30

## 2021-12-19 NOTE — PROGRESS NOTES
[facility]  PROGRESS NOTE     Patient Identification:  Name:  Mellisa Sneed  Age:  76 y.o.  Sex:  female  :  1945  MRN:  [id number]  Visit Number:  [id number]  ROOM: [facility]     Primary Care Provider:  Rosario Cates MD    Length of stay in inpatient status:  4    Subjective     Chief Compliant:  No chief complaint on file.      History of Presenting Illness: 76-year-old female with history of CAD with CABG, coronary stents, hyperlipidemia, hypertension, mild dementia, recent right hip fracture with right hemiarthroplasty.  Patient had some mild GI upset this morning.  She states she has some minimal nausea but it did have diarrhea x2.  No evidence of any bloody diarrhea per patient report.  Patient denies any recent antibiotic use    Objective     Current Hospital Meds:amLODIPine, 5 mg, Oral, Q24H  atorvastatin, 20 mg, Oral, Nightly  cetirizine, 5 mg, Oral, Daily  clopidogrel, 75 mg, Oral, Daily  donepezil, 5 mg, Oral, Nightly  enoxaparin, 40 mg, Subcutaneous, Q24H  escitalopram, 10 mg, Oral, Daily  influenza vaccine, 0.5 mL, Intramuscular, Once  lisinopril, 20 mg, Oral, Q24H  metoprolol succinate XL, 25 mg, Oral, Q24H  multivitamin, 1 tablet, Oral, Daily       ----------------------------------------------------------------------------------------------------------------------  Vital Signs:  Temp:  [98 °F (36.7 °C)] 98 °F (36.7 °C)  Heart Rate:  [86] 86  Resp:  [18] 18  BP: (144)/(78) 144/78  SpO2:  [95 %] 95 %  on   ;   Device (Oxygen Therapy): room air  Body mass index is 24.67 kg/m².    Wt Readings from Last 3 Encounters:   12/15/21 51.7 kg (114 lb)     Intake & Output (last 3 days)        0701   0700  07 07 07 07 07 0700    P.O. 1620 940 680 120    Total Intake(mL/kg) 1620 (31.3) 940 (18.2) 680 (13.2) 120 (2.3)    Net +1620 +940 +680 +120            Urine Unmeasured Occurrence 10 x 5 x 6 x 1 x    Stool Unmeasured Occurrence 1 x 2  x 2 x 1 x        Diet Regular  ----------------------------------------------------------------------------------------------------------------------  Physical exam:  Constitutional:   Frail elderly female no distress  HEENT: Normocephalic atraumatic  Neck: Supple   Cardiovascular: Regular rate and rhythm  Pulmonary/Chest: Clear to auscultation  Abdominal: Positive bowel sounds soft.   Musculoskeletal: Status post hip repair  Neurological: No focal deficits  Skin: No rash  Peripheral vascular:  Genitourinary:  ----------------------------------------------------------------------------------------------------------------------    Last echocardiogram:    ----------------------------------------------------------------------------------------------------------------------  Results from last 7 days   Lab Units 12/19/21  0134 12/16/21  0252   WBC 10*3/mm3 12.20* 8.94   HEMOGLOBIN g/dL 9.8* 9.0*   HEMATOCRIT % 30.2* 28.1*   MCV fL 94.4 94.9   MCHC g/dL 32.5 32.0   PLATELETS 10*3/mm3 399 256         Results from last 7 days   Lab Units 12/19/21  0134 12/16/21  0252   SODIUM mmol/L 139 138   POTASSIUM mmol/L 3.7 4.0   CHLORIDE mmol/L 109* 106   CO2 mmol/L 19.2* 20.0*   BUN mg/dL 17 25*   CREATININE mg/dL 0.94 1.00   EGFR IF NONAFRICN AM mL/min/1.73 58* 54*   CALCIUM mg/dL 8.8 8.7   GLUCOSE mg/dL 112* 106*   Estimated Creatinine Clearance: 40.3 mL/min (by C-G formula based on SCr of 0.94 mg/dL).  No results found for: AMMONIA              No results found for: HGBA1C, POCGLU  No results found for: TSH, FREET4  No results found for: PREGTESTUR, PREGSERUM, HCG, HCGQUANT  Pain Management Panel    There is no flowsheet data to display.       Brief Urine Lab Results     None        No results found for: BLOODCX      No results found for: URINECX  No results found for: WOUNDCX  No results found for: STOOLCX        I have personally looked at the labs and they are summarized  above.  ----------------------------------------------------------------------------------------------------------------------  Detailed radiology reports for the last 24 hours:    Imaging Results (Last 24 Hours)     ** No results found for the last 24 hours. **        Final impressions for the last 30 days of radiology reports:    No radiology results for the last 30 days.  I have personally looked at the radiology images and read the final radiology report.    Assessment & Plan    Status post hip fracture with right hip hemiarthroplasty--patient requiring minimum assist for bed mobility; minimum assistance for help with transfers; ambulated 70 feet x 2 with front wheel walker and contact-guard yesterday.  Patient did work on coordination; therapeutic exercise; functional endurance and motor coordination interventions yesterday.    Diarrhea--patient is had 2 loose stools this morning.  Patient has not been on any recent antibiotics per her report or according to chart.  Will place on as needed Imodium at this time.  Give Zofran for mild nausea will monitor closely    Mild leukocytosis--White count was 12,000 this morning we will monitor closely patient is had no fever no cough no other abnormalities at this time    CAD--stable.  Patient has no complaints of chest pain.  Continue medical management    Hypertension--control    Dementia continue Aricept 5 mg daily.  Patient is alert and oriented x3 this morning    VTE Prophylaxis:   Mechanical Order History:     None      Pharmalogical Order History:      Ordered     Dose Route Frequency Stop    12/15/21 7025  enoxaparin (LOVENOX) syringe 40 mg         40 mg SC Every 24 Hours 01/04/22 1752                  Dewey Campos MD  [facility]  12/19/21  10:24 EST

## 2021-12-19 NOTE — PROGRESS NOTES
Rehabilitation Nursing  Inpatient Rehabilitation Plan of Care Note    Plan of Care  Pain    Pain Management (Active)  Current Status (12/15/2021 2:25:00 PM): Potential for pain  Weekly Goal: No pain this week  Discharge Goal: No pain    Safety    Potential for Injury (Active)  Current Status (12/15/2021 2:25:00 PM): At risk for injury  Weekly Goal: No injury this week  Discharge Goal: No injury    Signed by: Kaiden Ly RN

## 2021-12-19 NOTE — PLAN OF CARE
Goal Outcome Evaluation:  Plan of Care Reviewed With: patient        Progress: improving   Pt progressing with therapies, cont POC

## 2021-12-20 LAB
ANION GAP SERPL CALCULATED.3IONS-SCNC: 12.9 MMOL/L (ref 5–15)
BACTERIA UR QL AUTO: ABNORMAL /HPF
BASOPHILS # BLD AUTO: 0.09 10*3/MM3 (ref 0–0.2)
BASOPHILS NFR BLD AUTO: 0.7 % (ref 0–1.5)
BILIRUB UR QL STRIP: NEGATIVE
BUN SERPL-MCNC: 15 MG/DL (ref 8–23)
BUN/CREAT SERPL: 16.1 (ref 7–25)
CALCIUM SPEC-SCNC: 8.7 MG/DL (ref 8.6–10.5)
CHLORIDE SERPL-SCNC: 105 MMOL/L (ref 98–107)
CLARITY UR: CLEAR
CO2 SERPL-SCNC: 19.1 MMOL/L (ref 22–29)
COLOR UR: ABNORMAL
CREAT SERPL-MCNC: 0.93 MG/DL (ref 0.57–1)
DEPRECATED RDW RBC AUTO: 51.2 FL (ref 37–54)
EOSINOPHIL # BLD AUTO: 0.24 10*3/MM3 (ref 0–0.4)
EOSINOPHIL NFR BLD AUTO: 1.8 % (ref 0.3–6.2)
ERYTHROCYTE [DISTWIDTH] IN BLOOD BY AUTOMATED COUNT: 15 % (ref 12.3–15.4)
GFR SERPL CREATININE-BSD FRML MDRD: 58 ML/MIN/1.73
GLUCOSE SERPL-MCNC: 132 MG/DL (ref 65–99)
GLUCOSE UR STRIP-MCNC: NEGATIVE MG/DL
HCT VFR BLD AUTO: 31.9 % (ref 34–46.6)
HGB BLD-MCNC: 10.4 G/DL (ref 12–15.9)
HGB UR QL STRIP.AUTO: NEGATIVE
HYALINE CASTS UR QL AUTO: ABNORMAL /LPF
IMM GRANULOCYTES # BLD AUTO: 0.1 10*3/MM3 (ref 0–0.05)
IMM GRANULOCYTES NFR BLD AUTO: 0.8 % (ref 0–0.5)
KETONES UR QL STRIP: ABNORMAL
LEUKOCYTE ESTERASE UR QL STRIP.AUTO: NEGATIVE
LYMPHOCYTES # BLD AUTO: 2.03 10*3/MM3 (ref 0.7–3.1)
LYMPHOCYTES NFR BLD AUTO: 15.3 % (ref 19.6–45.3)
MCH RBC QN AUTO: 31.1 PG (ref 26.6–33)
MCHC RBC AUTO-ENTMCNC: 32.6 G/DL (ref 31.5–35.7)
MCV RBC AUTO: 95.5 FL (ref 79–97)
MONOCYTES # BLD AUTO: 1.23 10*3/MM3 (ref 0.1–0.9)
MONOCYTES NFR BLD AUTO: 9.3 % (ref 5–12)
NEUTROPHILS NFR BLD AUTO: 72.1 % (ref 42.7–76)
NEUTROPHILS NFR BLD AUTO: 9.58 10*3/MM3 (ref 1.7–7)
NITRITE UR QL STRIP: NEGATIVE
NRBC BLD AUTO-RTO: 0 /100 WBC (ref 0–0.2)
PH UR STRIP.AUTO: 6.5 [PH] (ref 5–8)
PLATELET # BLD AUTO: 455 10*3/MM3 (ref 140–450)
PMV BLD AUTO: 10.1 FL (ref 6–12)
POTASSIUM SERPL-SCNC: 3.4 MMOL/L (ref 3.5–5.2)
POTASSIUM SERPL-SCNC: 4.9 MMOL/L (ref 3.5–5.2)
PROT UR QL STRIP: ABNORMAL
RBC # BLD AUTO: 3.34 10*6/MM3 (ref 3.77–5.28)
RBC # UR STRIP: ABNORMAL /HPF
REF LAB TEST METHOD: ABNORMAL
SODIUM SERPL-SCNC: 137 MMOL/L (ref 136–145)
SP GR UR STRIP: 1.02 (ref 1–1.03)
SQUAMOUS #/AREA URNS HPF: ABNORMAL /HPF
UROBILINOGEN UR QL STRIP: ABNORMAL
WBC # UR STRIP: ABNORMAL /HPF
WBC NRBC COR # BLD: 13.27 10*3/MM3 (ref 3.4–10.8)

## 2021-12-20 PROCEDURE — 97116 GAIT TRAINING THERAPY: CPT

## 2021-12-20 PROCEDURE — 97535 SELF CARE MNGMENT TRAINING: CPT

## 2021-12-20 PROCEDURE — 99231 SBSQ HOSP IP/OBS SF/LOW 25: CPT | Performed by: FAMILY MEDICINE

## 2021-12-20 PROCEDURE — 80048 BASIC METABOLIC PNL TOTAL CA: CPT | Performed by: FAMILY MEDICINE

## 2021-12-20 PROCEDURE — 84132 ASSAY OF SERUM POTASSIUM: CPT | Performed by: FAMILY MEDICINE

## 2021-12-20 PROCEDURE — 97110 THERAPEUTIC EXERCISES: CPT

## 2021-12-20 PROCEDURE — 25010000002 ENOXAPARIN PER 10 MG: Performed by: FAMILY MEDICINE

## 2021-12-20 PROCEDURE — 63710000001 ONDANSETRON PER 8 MG: Performed by: FAMILY MEDICINE

## 2021-12-20 PROCEDURE — 97530 THERAPEUTIC ACTIVITIES: CPT

## 2021-12-20 PROCEDURE — 81001 URINALYSIS AUTO W/SCOPE: CPT | Performed by: FAMILY MEDICINE

## 2021-12-20 PROCEDURE — 85025 COMPLETE CBC W/AUTO DIFF WBC: CPT | Performed by: FAMILY MEDICINE

## 2021-12-20 PROCEDURE — 25010000002 ONDANSETRON PER 1 MG: Performed by: FAMILY MEDICINE

## 2021-12-20 RX ORDER — POTASSIUM CHLORIDE 1.5 G/1.77G
40 POWDER, FOR SOLUTION ORAL EVERY 4 HOURS
Status: DISCONTINUED | OUTPATIENT
Start: 2021-12-20 | End: 2021-12-20

## 2021-12-20 RX ORDER — ONDANSETRON 2 MG/ML
4 INJECTION INTRAMUSCULAR; INTRAVENOUS EVERY 6 HOURS PRN
Status: DISCONTINUED | OUTPATIENT
Start: 2021-12-20 | End: 2021-12-29 | Stop reason: HOSPADM

## 2021-12-20 RX ORDER — POTASSIUM CHLORIDE 20 MEQ/1
40 TABLET, EXTENDED RELEASE ORAL EVERY 4 HOURS
Status: COMPLETED | OUTPATIENT
Start: 2021-12-20 | End: 2021-12-20

## 2021-12-20 RX ORDER — POTASSIUM CHLORIDE 20 MEQ/1
40 TABLET, EXTENDED RELEASE ORAL AS NEEDED
Status: DISCONTINUED | OUTPATIENT
Start: 2021-12-20 | End: 2021-12-29 | Stop reason: HOSPADM

## 2021-12-20 RX ORDER — POTASSIUM CHLORIDE 1.5 G/1.77G
40 POWDER, FOR SOLUTION ORAL AS NEEDED
Status: DISCONTINUED | OUTPATIENT
Start: 2021-12-20 | End: 2021-12-29 | Stop reason: HOSPADM

## 2021-12-20 RX ADMIN — ATORVASTATIN CALCIUM 20 MG: 20 TABLET, FILM COATED ORAL at 20:14

## 2021-12-20 RX ADMIN — ONDANSETRON 4 MG: 2 INJECTION INTRAMUSCULAR; INTRAVENOUS at 08:42

## 2021-12-20 RX ADMIN — POTASSIUM CHLORIDE 40 MEQ: 1.5 POWDER, FOR SOLUTION ORAL at 05:26

## 2021-12-20 RX ADMIN — ENOXAPARIN SODIUM 40 MG: 40 INJECTION SUBCUTANEOUS at 20:14

## 2021-12-20 RX ADMIN — Medication 1 TABLET: at 08:43

## 2021-12-20 RX ADMIN — ESCITALOPRAM 10 MG: 10 TABLET, FILM COATED ORAL at 08:43

## 2021-12-20 RX ADMIN — METOPROLOL SUCCINATE 25 MG: 25 TABLET, EXTENDED RELEASE ORAL at 08:43

## 2021-12-20 RX ADMIN — AMLODIPINE BESYLATE 5 MG: 5 TABLET ORAL at 08:43

## 2021-12-20 RX ADMIN — LISINOPRIL 20 MG: 10 TABLET ORAL at 08:43

## 2021-12-20 RX ADMIN — POTASSIUM CHLORIDE 40 MEQ: 20 TABLET, EXTENDED RELEASE ORAL at 14:26

## 2021-12-20 RX ADMIN — DONEPEZIL HYDROCHLORIDE 5 MG: 5 TABLET, FILM COATED ORAL at 20:14

## 2021-12-20 RX ADMIN — CLOPIDOGREL 75 MG: 75 TABLET, FILM COATED ORAL at 08:43

## 2021-12-20 RX ADMIN — ONDANSETRON HYDROCHLORIDE 4 MG: 4 TABLET, FILM COATED ORAL at 16:51

## 2021-12-20 RX ADMIN — CETIRIZINE HYDROCHLORIDE 5 MG: 10 TABLET, FILM COATED ORAL at 08:43

## 2021-12-20 RX ADMIN — ONDANSETRON HYDROCHLORIDE 4 MG: 4 TABLET, FILM COATED ORAL at 02:56

## 2021-12-20 NOTE — THERAPY TREATMENT NOTE
Inpatient Rehabilitation - Physical Therapy Treatment Note       MIAN Harinder     Patient Name: Mellisa Sneed  : 1945  MRN: [id number]    Today's Date: 2021                    Admit Date: 12/15/2021      Visit Dx:   No diagnosis found.    Patient Active Problem List   Diagnosis   • S/P right hip fracture       Past Medical History:   Diagnosis Date   • Arthritis    • CHF (congestive heart failure) (HCC)    • Hypertension        Past Surgical History:   Procedure Laterality Date   • CARDIAC SURGERY     • FRACTURE SURGERY         PT ASSESSMENT (last 12 hours)     IRF PT Evaluation and Treatment     Row Name 21 1443          PT Time and Intention    Document Type daily treatment  BID treatment session  -LL     Mode of Treatment individual therapy; physical therapy  -LL     Patient/Family/Caregiver Comments/Observations Patient and RN agreeable for out of bed activity this date.  -LL     Row Name 21 1443          General Information    Existing Precautions/Restrictions fall; right; hip  history of dementia  -     Row Name 21 1443          Cognition/Psychosocial    Affect/Mental Status (Cognitive) --  alert and cooperative, confusion noted  -     Orientation Status (Cognition) oriented to; person; situation; verbal cues/prompts needed for orientation; other (see comments)  -LL     Follows Commands (Cognition) verbal cues/prompting required; physical/tactile prompts required  -LL     Personal Safety Interventions gait belt; nonskid shoes/slippers when out of bed; supervised activity  -LL     Row Name 21 1443          Pain Scale: FACES Pre/Post-Treatment    Pain: FACES Scale, Pretreatment 6-->hurts even more  -LL     Posttreatment Pain Rating 6-->hurts even more  -     Row Name 21 1443          Mobility    Left Lower Extremity (Weight-bearing Status) weight-bearing as tolerated (WBAT)  -LL     Right Lower Extremity (Weight-bearing Status) weight-bearing as tolerated (WBAT)  -LL      Row Name 12/20/21 1443          Bed Mobility    Supine-Sit [place] (Bed Mobility) minimum assist (75% patient effort); verbal cues; nonverbal cues (demo/gesture)  -LL     Assistive Device (Bed Mobility) bed rails  -LL     Row Name 12/20/21 1443          Transfers    Bed-Chair [place] (Transfers) minimum assist (75% patient effort); verbal cues  -LL     Chair-Bed [place] (Transfers) minimum assist (75% patient effort); verbal cues  -LL     Assistive Device (Bed-Chair Transfers) wheelchair; walker, front-wheeled  -LL     Sit-Stand [place] (Transfers) minimum assist (75% patient effort); verbal cues; nonverbal cues (demo/gesture)  -LL     Stand-Sit [place] (Transfers) minimum assist (75% patient effort); verbal cues; nonverbal cues (demo/gesture)  -LL     [place] Level (Toilet Transfer) minimum assist (75% patient effort); verbal cues  -LL     Assistive Device (Toilet Transfer) raised toilet seat; grab bars/safety frame; wheelchair  -LL     Row Name 12/20/21 1443          Chair-Bed Transfer    Assistive Device (Chair-Bed Transfers) wheelchair  -LL     Row Name 12/20/21 1443          Sit-Stand Transfer    Assistive Device (Sit-Stand Transfers) walker, front-wheeled  -LL     Row Name 12/20/21 1443          Stand-Sit Transfer    Assistive Device (Stand-Sit Transfers) walker, front-wheeled  -LL     Row Name 12/20/21 1443          Toilet Transfer    Type (Toilet Transfer) stand pivot/stand step  x 2  -LL     Row Name 12/20/21 1443          Gait/Stairs (Locomotion)    [place] Level (Gait) contact guard; verbal cues; nonverbal cues (demo/gesture)  -LL     Assistive Device (Gait) walker, front-wheeled  -LL     Distance in Feet (Gait) 170' x 2 in AM; 170', 150' & 60' (working on increased R LE WB during last round of ambulation)  -LL     Pattern (Gait) step-to  -LL     Deviations/Abnormal Patterns (Gait) antalgic; luis decreased; gait speed decreased; stride length decreased  -LL      Bilateral Gait Deviations forward flexed posture; weight shift ability decreased  -LL     Row Name 12/20/21 1443          Safety Issues, Functional Mobility    Impairments Affecting Function (Mobility) balance; cognition; endurance/activity tolerance; pain; range of motion (ROM); strength  -LL     Row Name 12/20/21 1443          Balance    Comment, Balance Standing WS in // bars  -LL     Row Name 12/20/21 1443          Hip (Therapeutic Exercise)    Hip Strengthening (Therapeutic Exercise) bilateral; flexion; extension; aBduction; aDduction; marching while seated; marching while standing; sitting; standing; resistance band; green  -     Row Name 12/20/21 1443          Knee (Therapeutic Exercise)    Knee Strengthening (Therapeutic Exercise) bilateral; flexion; extension; marching while seated; marching while standing; LAQ (long arc quad); hamstring curls; sitting; standing; resistance band; green  [facility]     Row Name 12/20/21 [facility]          Ankle (Therapeutic Exercise)    Ankle Strengthening (Therapeutic Exercise) bilateral; dorsiflexion; plantarflexion; sitting; standing  -     Row Name 12/20/21 [facility]          IRF PT Goals    Bed Mobility Goal Selection (PT-IRF) bed mobility, PT goal 1  -LL     Transfer Goal Selection (PT-IRF) transfers, PT goal 1  -LL     Gait (Walking Locomotion) Goal Selection (PT-IRF) gait, PT goal 1  -LL     Row Name 12/20/21 1443          Bed Mobility Goal 1 (PT-IRF)    Activity/Assistive Device (Bed Mobility Goal 1, PT-IRF) sit to supine/supine to sit  -LL     [place] Level (Bed Mobility Goal 1, PT-IRF) independent  -LL     Time Frame (Bed Mobility Goal 1, PT-IRF) by discharge  -     Row Name 12/20/21 1443          Transfer Goal 1 (PT-IRF)    Activity/Assistive Device (Transfer Goal 1, PT-IRF) sit-to-stand/stand-to-sit; bed-to-chair/chair-to-bed  -LL     [place] Level (Transfer Goal 1, PT-IRF) supervision required  -LL     Time Frame (Transfer Goal 1, PT-IRF) by discharge  -      Row Name 12/20/21 1443          Gait/Walking Locomotion Goal 1 (PT-IRF)    Activity/Assistive Device (Gait/Walking Locomotion Goal 1, PT-IRF) walker, rolling  -LL     Gait/Walking Locomotion Distance Goal 1 (PT-IRF) 300'  -LL     [place] Level (Gait/Walking Locomotion Goal 1, PT-IRF) supervision required  -LL     Time Frame (Gait/Walking Locomotion Goal 1, PT-IRF) by discharge  -LL     Row Name 12/20/21 1443          Positioning and Restraints    Pre-Treatment Position in bed  -LL     Post Treatment Position wheelchair  -LL     In Wheelchair sitting; call light within reach; encouraged to call for assist; legs elevated  in AM; w/ OT in PM  -LL     Row Name 12/20/21 1443          Therapy Assessment/Plan (PT)    Patient's Goals For Discharge return home  -     Row Name 12/20/21 1443          Therapy Assessment/Plan (PT)    Rehab Potential/Prognosis (PT) adequate, monitor progress closely  -LL     Frequency of Treatment (PT) 5 times per week  -LL     Estimated Duration of Therapy (PT) 2 weeks  -LL     Problem List (PT) balance; cognition; mobility; range of motion (ROM); strength; pain  -LL     Activity Limitations Related to Problem List (PT) unable to ambulate safely; unable to transfer safely  -     Row Name 12/20/21 1443          Therapy Plan Review/Discharge Plan (PT)    Anticipated Equipment Needs at Discharge (PT Eval) --  tbd  -LL     Expected Discharge Disposition (PT Eval) home with home health care; home with caregiver  -           User Key  (r) = Recorded By, (t) = Taken By, (c) = Cosigned By    Initials Name Provider Type    LL Casie Hernandez PTA Physical Therapy Assistant              Wound 12/15/21 1530 Right anterior greater trochanter (Active)   Dressing Appearance dry; intact 12/20/21 1244   Closure RAMON 12/19/21 2000   Base dressing in place, unable to visualize 12/20/21 1244   Drainage Characteristics/Odor serosanguineous 12/20/21 1244   Drainage Amount small 12/20/21 1244     Physical  Therapy Education                 Title: PT OT SLP Therapies (Done)     Topic: Physical Therapy (Done)     Point: Mobility training (Done)     Learning Progress Summary           Patient Acceptance, E,D, VU,NR by LL at 12/20/2021 1448    Acceptance, E,D, VU,NR by LL at 12/18/2021 1255    Acceptance, E,D, VU,NR by LL at 12/17/2021 1950    Acceptance, E, VU,NR by LB at 12/16/2021 1401                   Point: Home exercise program (Done)     Learning Progress Summary           Patient Acceptance, E,D, VU,NR by LL at 12/20/2021 1448    Acceptance, E,D, VU,NR by LL at 12/18/2021 1255    Acceptance, E,D, VU,NR by LL at 12/17/2021 1950    Acceptance, E, VU,NR by LB at 12/16/2021 1401                   Point: Body mechanics (Done)     Learning Progress Summary           Patient Acceptance, E,D, VU,NR by LL at 12/20/2021 1448    Acceptance, E,D, VU,NR by LL at 12/18/2021 1255    Acceptance, E,D, VU,NR by LL at 12/17/2021 1950    Acceptance, E, VU,NR by LB at 12/16/2021 1401                   Point: Precautions (Done)     Learning Progress Summary           Patient Acceptance, E,D, VU,NR by LL at 12/20/2021 1448    Acceptance, E,D, VU,NR by LL at 12/18/2021 1255    Acceptance, E,D, VU,NR by LL at 12/17/2021 1950    Acceptance, E, VU,NR by LB at 12/16/2021 1401                               User Key     Initials Effective Dates Name Provider Type Discipline     06/16/21 -  Juainta Perez, PT Physical Therapist PT     05/02/16 -  Casie Hernandez PTA Physical Therapy Assistant PT                PT Recommendation and Plan    Frequency of Treatment (PT): 5 times per week  Anticipated Equipment Needs at Discharge (PT Eval):  (tbd)                  Time Calculation:      PT Charges     Row Name 12/20/21 1454 12/20/21 1451          Time Calculation    Start Time 1250  -LL 1045  -LL     Stop Time 1335  -LL 1140  -LL     Time Calculation (min) 45 min  -LL 55 min  -LL     PT Received On -- 12/20/21  -LL            Time  Calculation- PT    Total Timed Code Minutes- PT 45 minute(s)  -LL 55 minute(s)  -LL           User Key  (r) = Recorded By, (t) = Taken By, (c) = Cosigned By    Initials Name Provider Type    Casie Torres PTA Physical Therapy Assistant                Therapy Charges for Today     Code Description Service Date Service Provider Modifiers Qty    32358781633 HC GAIT TRAINING EA 15 MIN 12/20/2021 Casie Hernandez PTA GP 4    14242578287 HC PT THER PROC EA 15 MIN 12/20/2021 Casie Hernandez PTA GP 3                   Casie. JAMARI Hernandez  12/20/2021

## 2021-12-20 NOTE — PROGRESS NOTES
[facility]  PROGRESS NOTE     Patient Identification:  Name:  Mellisa Sneed  Age:  76 y.o.  Sex:  female  :  1945  MRN:  [id number]  Visit Number:  [id number]  ROOM: [facility]/     Primary Care Provider:  Rosario Cates MD    Length of stay in inpatient status:  5    Subjective     Chief Compliant:  No chief complaint on file.      History of Presenting Illness: 76-year-old female with history of CAD with CABG, history of coronary stents, hyperlipidemia, hypertension, mild dementia, recent right hip fracture with right hemiarthroplasty.  Patient became nauseated and did have one episode of vomiting after taking oral potassium this morning.  Patient had one loose stool this morning.  Yesterday when I saw patient she had 2 loose stools but did not require Imodium afterwards and had no further bowel movements throughout the day.  Patient's white count is slightly elevated this morning but denies new cough.  Denies dysuria denies any other issues at this time.    Objective     Current Hospital Meds:amLODIPine, 5 mg, Oral, Q24H  atorvastatin, 20 mg, Oral, Nightly  cetirizine, 5 mg, Oral, Daily  clopidogrel, 75 mg, Oral, Daily  donepezil, 5 mg, Oral, Nightly  enoxaparin, 40 mg, Subcutaneous, Q24H  escitalopram, 10 mg, Oral, Daily  influenza vaccine, 0.5 mL, Intramuscular, Once  lisinopril, 20 mg, Oral, Q24H  metoprolol succinate XL, 25 mg, Oral, Q24H  multivitamin, 1 tablet, Oral, Daily  potassium chloride, 40 mEq, Oral, Q4H       ----------------------------------------------------------------------------------------------------------------------  Vital Signs:  Temp:  [98.7 °F (37.1 °C)] 98.7 °F (37.1 °C)  Heart Rate:  [] 104  Resp:  [18] 18  BP: (162-200)/(74-96) 200/96  SpO2:  [96 %] 96 %  on   ;   Device (Oxygen Therapy): room air  Body mass index is 24.67 kg/m².    Wt Readings from Last 3 Encounters:   12/15/21 51.7 kg (114 lb)     Intake & Output (last 3 days)        0701   0700  12/18 0701 12/19 0700 12/19 0701 12/20 0700 12/20 0701 12/21 0700    P.O. 940 680 680 120    Total Intake(mL/kg) 940 (18.2) 680 (13.2) 680 (13.2) 120 (2.3)    Net +940 +680 +680 +120            Urine Unmeasured Occurrence 5 x 6 x 5 x 1 x    Stool Unmeasured Occurrence 2 x 2 x 3 x 1 x    Emesis Unmeasured Occurrence   1 x         Diet Regular  ----------------------------------------------------------------------------------------------------------------------  Physical exam:  Constitutional:   No acute distress  HEENT: Normocephalic atraumatic  Neck:    Supple  Cardiovascular: Regular rate and rhythm  Pulmonary/Chest: Clear to auscultation  Abdominal: Positive bowel sounds soft.   Musculoskeletal: No arthropathy  Neurological: No focal deficits  Skin: Incision site is clean no evidence of any erythema or discharge from right hip wound  Peripheral vascular:  Genitourinary:  ----------------------------------------------------------------------------------------------------------------------    Last echocardiogram:    ----------------------------------------------------------------------------------------------------------------------  Results from last 7 days   Lab Units 12/20/21 0023 12/19/21 0134 12/16/21 0252   WBC 10*3/mm3 13.27* 12.20* 8.94   HEMOGLOBIN g/dL 10.4* 9.8* 9.0*   HEMATOCRIT % 31.9* 30.2* 28.1*   MCV fL 95.5 94.4 94.9   MCHC g/dL 32.6 32.5 32.0   PLATELETS 10*3/mm3 455* 399 256         Results from last 7 days   Lab Units 12/20/21 0023 12/19/21 0134 12/16/21 0252   SODIUM mmol/L 137 139 138   POTASSIUM mmol/L 3.4* 3.7 4.0   CHLORIDE mmol/L 105 109* 106   CO2 mmol/L 19.1* 19.2* 20.0*   BUN mg/dL 15 17 25*   CREATININE mg/dL 0.93 0.94 1.00   EGFR IF NONAFRICN AM mL/min/1.73 58* 58* 54*   CALCIUM mg/dL 8.7 8.8 8.7   GLUCOSE mg/dL 132* 112* 106*   Estimated Creatinine Clearance: 40.7 mL/min (by C-G formula based on SCr of 0.93 mg/dL).  No results found for: AMMONIA              No results found  for: HGBA1C, POCGLU  No results found for: TSH, FREET4  No results found for: PREGTESTUR, PREGSERUM, HCG, HCGQUANT  Pain Management Panel    There is no flowsheet data to display.       Brief Urine Lab Results     None        No results found for: BLOODCX      No results found for: URINECX  No results found for: WOUNDCX  No results found for: STOOLCX        I have personally looked at the labs and they are summarized above.  ----------------------------------------------------------------------------------------------------------------------  Detailed radiology reports for the last 24 hours:    Imaging Results (Last 24 Hours)     ** No results found for the last 24 hours. **        Final impressions for the last 30 days of radiology reports:    No radiology results for the last 30 days.  I have personally looked at the radiology images and read the final radiology report.    Assessment & Plan    Status post hip fracture with right hip hemiarthroplasty--appears to be healing well.  Last week patient requiring minimum assistance for up with transfers; ambulated 70 feet x 2 with front wheel walker and contact-guard last week.  Has continue to work on motor skills to improve ADLs last week    Nausea vomiting x1 with loose stools--we will monitor for now.  Supportive care.  Patient has been on no recent antibiotics    Leukocytosis--we will check urinalysis.  Patient otherwise asymptomatic    CAD stable    Hypertension did have elevated blood pressure early this morning with her vomiting has since improved to systolic pressure of 148 mmHg    Dementia continue Aricept    VTE Prophylaxis:   Mechanical Order History:     None      Pharmalogical Order History:      Ordered     Dose Route Frequency Stop    12/15/21 1445  enoxaparin (LOVENOX) syringe 40 mg         40 mg SC Every 24 Hours 01/04/22 2501                  Dewey Campos MD  [facility]  12/20/21  11:22 EST

## 2021-12-20 NOTE — THERAPY TREATMENT NOTE
Inpatient Rehabilitation - Occupational Therapy Treatment Note     [place]     Patient Name: Mellisa Sneed  : 1945  MRN: [id number]    Today's Date: 2021                 Admit Date: 12/15/2021       No diagnosis found.    Patient Active Problem List   Diagnosis   • S/P right hip fracture       Past Medical History:   Diagnosis Date   • Arthritis    • CHF (congestive heart failure) (HCC)    • Hypertension        Past Surgical History:   Procedure Laterality Date   • CARDIAC SURGERY     • FRACTURE SURGERY               IRF OT ASSESSMENT FLOWSHEET (last 12 hours)     IRF OT Evaluation and Treatment     Row Name 21 0948          OT Time and Intention    Document Type daily treatment  -TM     Mode of Treatment occupational therapy  -TM     Patient Effort adequate  -TM     Symptoms Noted During/After Treatment none  -TM     Row Name 2117          General Information    General Observations of Patient Pt agreeable for therapy. AM tx session completed at bedside secondary to RN requested pt only perform bedside therapy in am. RN cleared pt for out of bed therapy in pm.  -TM     Existing Precautions/Restrictions fall; hip  -TM     Row Name 2148          Cognition/Psychosocial    Orientation Status (Cognition) oriented to; person; situation; verbal cues/prompts needed for orientation; other (see comments)  confusion at times noted  -TM     Follows Commands (Cognition) follows one-step commands; verbal cues/prompting required; repetition of directions required  -TM     Row Name 2148          Transfers    Chair-Bed [place] (Transfers) minimum assist (75% patient effort); verbal cues  -TM     Row Name 2148          Chair-Bed Transfer    Assistive Device (Chair-Bed Transfers) wheelchair  -TM     Row Name 2148          Motor Skills    Motor Control/Coordination Interventions therapeutic exercise/ROM; fine motor manipulation/dexterity activities; gross motor  coordination activities; other (see comments)  BUE ther ex/act, GMC/FMC  -TM     Row Name 12/20/21 0948          Grooming    [place] Level (Grooming) set up  -TM     Position (Grooming) supported sitting  -TM           User Key  (r) = Recorded By, (t) = Taken By, (c) = Cosigned By    Initials Name Effective Dates    TM Sherry Varela, OT 06/16/21 -                  Occupational Therapy Education                 Title: PT OT SLP Therapies (Done)     Topic: Occupational Therapy (Done)     Point: ADL training (Done)     Description:   Instruct learner(s) on proper safety adaptation and remediation techniques during self care or transfers.   Instruct in proper use of assistive devices.              Learning Progress Summary           Patient Acceptance, E,D, VU,NR by TM at 12/20/2021 0947    Acceptance, D,E, NR by AS at 12/18/2021 0923    Acceptance, E,D, VU,NR by TM at 12/17/2021 1313    Acceptance, E,D, VU,NR by  at 12/16/2021 1522    Acceptance, E,D, VU,NR by TM at 12/16/2021 1126    Acceptance, E,D, VU,NR by TM at 12/16/2021 1126                   Point: Precautions (Done)     Description:   Instruct learner(s) on prescribed precautions during self-care and functional transfers.              Learning Progress Summary           Patient Acceptance, E,D, VU,NR by TM at 12/20/2021 0947    Acceptance, D,E, NR by AS at 12/18/2021 0923    Acceptance, E,D, VU,NR by TM at 12/17/2021 1313    Acceptance, E,D, VU,NR by  at 12/16/2021 1522    Acceptance, E,D, VU,NR by TM at 12/16/2021 1126    Acceptance, E,D, VU,NR by TM at 12/16/2021 1126                               User Key     Initials Effective Dates Name Provider Type Discipline     06/16/21 -  Khalida Glover, OT Occupational Therapist OT    TM 06/16/21 -  Sherry Varela, OT Occupational Therapist OT    AS 06/16/21 -  Tabitha Juarez, OT Occupational Therapist OT                    OT Recommendation and Plan    Planned Therapy Interventions (OT):  activity tolerance training, adaptive equipment training, BADL retraining, IADL retraining, ROM/therapeutic exercise, strengthening exercise, transfer/mobility retraining, occupation/activity based interventions, patient/caregiver education/training                    Time Calculation:      Time Calculation- OT     Row Name 12/20/21 1446 12/20/21 0947          Time Calculation- OT    OT Start Time 1335  -TM 0915  -TM     OT Stop Time 1420  -TM 1000  -TM     OT Time Calculation (min) 45 min  -TM 45 min  -TM     Total Timed Code Minutes- OT 45 minute(s)  -TM 45 minute(s)  -TM     OT Non-Billable Time (min) -- 15 min  -TM           User Key  (r) = Recorded By, (t) = Taken By, (c) = Cosigned By    Initials Name Provider Type    TM Sherry Varela OT Occupational Therapist              Therapy Charges for Today     Code Description Service Date Service Provider Modifiers Qty    60844594904 HC OT SELF CARE/MGMT/TRAIN EA 15 MIN 12/20/2021 Shrery Varela OT GO 1    02890527980 HC OT THERAPEUTIC ACT EA 15 MIN 12/20/2021 Sherry Varela OT GO 2    68097861668 HC OT THERAPEUTIC ACT EA 15 MIN 12/20/2021 Sherry Varela OT GO 1    03155921160 HC OT THER PROC EA 15 MIN 12/20/2021 Sherry Varela OT GO 2                   Sherry Varela OT  12/20/2021

## 2021-12-20 NOTE — PROGRESS NOTES
"Patient Assessment Instrument  Quality Indicators - Admission    Section B. Hearing, Speech Vision      Section C. Cognitive Patterns  Brief Interview for Mental Status (BIMS) was conducted.  Repetition of Three Words: Three words  Able to report correct year: Missed by more than 5 years or no answer  Able to report correct month: Accurate within 5 days  Able to report correct day of the week: Incorrect or no answer  Able to recall \"sock\": No, could not recall  Able to recall \"blue\": No, could not recall  Able to recall \"bed\": No, could not recall    BIMS SUMMARY SCORE: 5 Severe impairment Patient was able to complete the Brief  Interview for Mental Status    Section [id number]. Prior Functioning    Self Care: Patient completed the activities by him/herself, with or without an  assistive device, with no assistance from a helper.  Indoor Mobility: Patient completed the activities by him/herself, with or  without an assistive device, with no assistance from a helper.  Stairs: Patient completed the activities by him/herself, with or without an  assistive device, with no assistance from a helper.  Functional Cognition: Patient completed the activities by him/herself, with or  without an assistive device, with no assistance from a helper.    Section XB0530. Prior Device Use      Section NP0503. Self Care Performance      Section LL2076. Self Care Discharge Goals      Section CB6129. Mobility Performance      Section PV0989. Mobility Discharge Goals      Section H. Bladder and Bowel  Bladder Continence: Incontinent daily.  Bowel Continence: Always continent (no documented incontinence).    Section I. Active Diagnosis  Comorbidities and Co-existing Conditions:   Patient does not have PAD, PVD, or  Diabetes Mellitus    Section J. Health Conditions  Patient has had two or more falls, or a fall with injury, in the past year.  Patient has had major surgery during the 100 days prior to admission.    Section K. " Swallowing/Nutritional Status  Regular food (solids and liquids swallowed safely without supervision or  modified food or liquid consistency).    Section M. Skin Conditions  Unhealed Pressure Ulcer/Injuries at Stage 1 or Higher on Admission:  No.    Section N. Medication    Potential Clinically Significant Medication Issues: No issues found during  review    Section O. Special Treatments, Procedures, and Programs  Patient did not receive total parenteral nutrition treatment at the time of  admission.    OPTIONAL BRANCH FOR TRACKING FALLS  Fall(s) During Shift: No falls.    Signed by: Maddi Poe, Supervisor

## 2021-12-20 NOTE — PROGRESS NOTES
PPS CMG Coordinator  Inpatient Rehabilitation Admission    Ethnic Group: White.  Marital Status:  Marital Status: .    IRF Admission Date:  12/15/2021  Admission Class: Initial Rehab.  Admit From:  [facility]    Pre-Hospital Living: Home. Pre-Hospital Living  With: (1) Alone.    Payment Sources: Primary: Medicare Fee for Service  Secondary: Not Listed.  Impairment Group: 08.11 Status Post Unilateral Hip Fracture  Date of Onset of Impairment: 12/10/2021    Etiologic Diagnosis Code(s):  Rank Code      Description  1    S72.001A  Fracture of unspecified part of neck of right                 femur, initial encounter for closed fracture    Comorbidities:      Height on Admission: 57 inches.  Weight on Admission: 114 pounds.    Are there any arthritis conditions recorded for Impairment Group, Etiologic  Diagnosis, or Comorbid Conditions that meet all of the regulatory requirements  for IRF classification (in 42 .29(b)(2)(x), (xi), and xii))?    SEAN Bladder Accidents:  0 - Accidents.  Bladder Score = 1.  Five (5) or more  bladder accidents.  SEAN Bowel Accident: 0 -Accidents.  Bowel Score = 6. Patient has no accidents, but uses a device/medications.  medication    Signed by: Maddi Poe, Supervisor

## 2021-12-20 NOTE — PROGRESS NOTES
Inpatient Rehabilitation Functional Measures Assessment and Plan of Care    Plan of Care  Self Care    [OT] Dressing (Lower)(Active)  Current Status(12/20/2021): maxA  Weekly Goal(12/28/2021): maxA/modA  Discharge Goal: modA    Performed Intervention(s)  ADL retraining/AE education, fxal mobility, BUE ther ex/act, GMC/FMC, hip  precautions    Functional Measures  SEAN Eating:  SEAN Grooming:  SEAN Bathing:  SEAN Upper Body Dressing:  SEAN Lower Body Dressing:  SEAN Toileting:    SEAN Bladder Management  Level of Assistance:  Frequency/Number of Accidents this Shift:    SEAN Bowel Management  Level of Assistance:  Frequency/Number of Accidents this Shift:    SEAN Bed/Chair/Wheelchair Transfer:  SEAN Toilet Transfer:  SEAN Tub/Shower Transfer:    Previously Documented Mode of Locomotion at Discharge:  [facility] Expected Mode of Locomotion at Discharge:  SEAN Walk/Wheelchair:  [facility] Stairs:    SEAN Comprehension:  SEAN Expression:  SEAN Social Interaction:  SEAN Problem Solving:  SEAN Memory:    Therapy Mode Minutes  Occupational Therapy: Individual: 90 minutes.  Physical Therapy:  Speech Language Pathology:    Discharge Functional Goals:    Signed by: Sherry Varela Occupational Therapist

## 2021-12-20 NOTE — PROGRESS NOTES
PPS CMG Coordinator  Inpatient Rehabilitation Admission    Ethnic Group:  Marital Status:    IRF Admission Date:  12/15/2021  Admission Class:  Admit From:    Pre-Hospital Living:    Payment Sources:  Impairment Group:  Date of Onset of Impairment:    Etiologic Diagnosis Code(s):  Rank Code      Description  1    S72.001A  Fracture of unspecified part of neck of right                 femur, initial encounter for closed fracture    Comorbidities:  Rank Code      Description    1    E78.5     Hyperlipidemia, unspecified  2    F03.90    Unspecified dementia without behavioral                 disturbance  3    I10       Essential (primary) hypertension  4    I25.10    Atherosclerotic heart disease of native                 coronary artery without angina pectoris  5    Z79.02    Long term (current) use of                 antithrombotics/antiplatelets  6    Z79.899   Other long term (current) drug therapy  7    Z87.891   Personal history of nicotine dependence  8    Z95.1     Presence of aortocoronary bypass graft  9    Z95.5     Presence of coronary angioplasty implant and                 graft    Height on Admission:  Weight on Admission:    Are there any arthritis conditions recorded for Impairment Group, Etiologic  Diagnosis, or Comorbid Conditions that meet all of the regulatory requirements  for IRF classification (in 42 .29(b)(2)(x), (xi), and xii))?  No    SEAN Bladder Accidents:  0 - Accidents.  Bladder Score = 1.  Five (5) or more  bladder accidents.  SEAN Bowel Accident: 0 -Accidents.  Bowel Score = 7. Patient has no accidents.    Signed by: Monica Lewis Nurse

## 2021-12-20 NOTE — PLAN OF CARE
Goal Outcome Evaluation:  Plan of Care Reviewed With: patient        Progress: no change   Pt progressing with therapies, cont POC

## 2021-12-20 NOTE — PROGRESS NOTES
Occupational Therapy:    Physical Therapy: Individual: 100 minutes.    Speech Language Pathology:    Signed by: Casie Hernandez PTA

## 2021-12-20 NOTE — PROGRESS NOTES
Rehabilitation Nursing  Inpatient Rehabilitation Plan of Care Note    Plan of Care  Copy from [place]    Pain Management (Active)  Current Status (12/15/2021 2:25:00 PM): Potential for pain  Weekly Goal: No pain this week  Discharge Goal: No pain    Safety    Potential for Injury (Active)  Current Status (12/15/2021 2:25:00 PM): At risk for injury  Weekly Goal: No injury this week  Discharge Goal: No injury    Signed by: Agnes Morocho, Nurse

## 2021-12-21 ENCOUNTER — APPOINTMENT (OUTPATIENT)
Dept: GENERAL RADIOLOGY | Facility: HOSPITAL | Age: 76
End: 2021-12-21

## 2021-12-21 LAB
ANION GAP SERPL CALCULATED.3IONS-SCNC: 12.9 MMOL/L (ref 5–15)
BASOPHILS # BLD AUTO: 0.1 10*3/MM3 (ref 0–0.2)
BASOPHILS NFR BLD AUTO: 0.7 % (ref 0–1.5)
BUN SERPL-MCNC: 15 MG/DL (ref 8–23)
BUN/CREAT SERPL: 14 (ref 7–25)
CALCIUM SPEC-SCNC: 9.2 MG/DL (ref 8.6–10.5)
CHLORIDE SERPL-SCNC: 107 MMOL/L (ref 98–107)
CO2 SERPL-SCNC: 18.1 MMOL/L (ref 22–29)
CREAT SERPL-MCNC: 1.07 MG/DL (ref 0.57–1)
CRP SERPL-MCNC: 3.16 MG/DL (ref 0–0.5)
D-LACTATE SERPL-SCNC: 1 MMOL/L (ref 0.5–2)
DEPRECATED RDW RBC AUTO: 53.4 FL (ref 37–54)
EOSINOPHIL # BLD AUTO: 0.25 10*3/MM3 (ref 0–0.4)
EOSINOPHIL NFR BLD AUTO: 1.7 % (ref 0.3–6.2)
ERYTHROCYTE [DISTWIDTH] IN BLOOD BY AUTOMATED COUNT: 15.3 % (ref 12.3–15.4)
GFR SERPL CREATININE-BSD FRML MDRD: 50 ML/MIN/1.73
GLUCOSE SERPL-MCNC: 115 MG/DL (ref 65–99)
HCT VFR BLD AUTO: 33.7 % (ref 34–46.6)
HGB BLD-MCNC: 10.7 G/DL (ref 12–15.9)
IMM GRANULOCYTES # BLD AUTO: 0.1 10*3/MM3 (ref 0–0.05)
IMM GRANULOCYTES NFR BLD AUTO: 0.7 % (ref 0–0.5)
LYMPHOCYTES # BLD AUTO: 2.71 10*3/MM3 (ref 0.7–3.1)
LYMPHOCYTES NFR BLD AUTO: 18 % (ref 19.6–45.3)
MCH RBC QN AUTO: 30.7 PG (ref 26.6–33)
MCHC RBC AUTO-ENTMCNC: 31.8 G/DL (ref 31.5–35.7)
MCV RBC AUTO: 96.8 FL (ref 79–97)
MONOCYTES # BLD AUTO: 1.2 10*3/MM3 (ref 0.1–0.9)
MONOCYTES NFR BLD AUTO: 8 % (ref 5–12)
NEUTROPHILS NFR BLD AUTO: 10.68 10*3/MM3 (ref 1.7–7)
NEUTROPHILS NFR BLD AUTO: 70.9 % (ref 42.7–76)
NRBC BLD AUTO-RTO: 0 /100 WBC (ref 0–0.2)
PLATELET # BLD AUTO: 509 10*3/MM3 (ref 140–450)
PMV BLD AUTO: 10.1 FL (ref 6–12)
POTASSIUM SERPL-SCNC: 4.6 MMOL/L (ref 3.5–5.2)
RBC # BLD AUTO: 3.48 10*6/MM3 (ref 3.77–5.28)
SODIUM SERPL-SCNC: 138 MMOL/L (ref 136–145)
WBC NRBC COR # BLD: 15.04 10*3/MM3 (ref 3.4–10.8)

## 2021-12-21 PROCEDURE — 85025 COMPLETE CBC W/AUTO DIFF WBC: CPT | Performed by: FAMILY MEDICINE

## 2021-12-21 PROCEDURE — 97110 THERAPEUTIC EXERCISES: CPT

## 2021-12-21 PROCEDURE — 80048 BASIC METABOLIC PNL TOTAL CA: CPT | Performed by: FAMILY MEDICINE

## 2021-12-21 PROCEDURE — 74022 RADEX COMPL AQT ABD SERIES: CPT

## 2021-12-21 PROCEDURE — 97530 THERAPEUTIC ACTIVITIES: CPT

## 2021-12-21 PROCEDURE — 97116 GAIT TRAINING THERAPY: CPT

## 2021-12-21 PROCEDURE — 86140 C-REACTIVE PROTEIN: CPT | Performed by: FAMILY MEDICINE

## 2021-12-21 PROCEDURE — 25010000002 ENOXAPARIN PER 10 MG: Performed by: FAMILY MEDICINE

## 2021-12-21 PROCEDURE — 99231 SBSQ HOSP IP/OBS SF/LOW 25: CPT | Performed by: FAMILY MEDICINE

## 2021-12-21 PROCEDURE — 97535 SELF CARE MNGMENT TRAINING: CPT

## 2021-12-21 PROCEDURE — 74022 RADEX COMPL AQT ABD SERIES: CPT | Performed by: RADIOLOGY

## 2021-12-21 PROCEDURE — 83605 ASSAY OF LACTIC ACID: CPT | Performed by: FAMILY MEDICINE

## 2021-12-21 RX ORDER — METRONIDAZOLE 250 MG/1
500 TABLET ORAL EVERY 8 HOURS SCHEDULED
Status: DISCONTINUED | OUTPATIENT
Start: 2021-12-21 | End: 2021-12-24

## 2021-12-21 RX ADMIN — ATORVASTATIN CALCIUM 20 MG: 20 TABLET, FILM COATED ORAL at 20:41

## 2021-12-21 RX ADMIN — ENOXAPARIN SODIUM 40 MG: 40 INJECTION SUBCUTANEOUS at 17:31

## 2021-12-21 RX ADMIN — DONEPEZIL HYDROCHLORIDE 5 MG: 5 TABLET, FILM COATED ORAL at 20:41

## 2021-12-21 RX ADMIN — METRONIDAZOLE 500 MG: 250 TABLET ORAL at 13:21

## 2021-12-21 RX ADMIN — AMLODIPINE BESYLATE 5 MG: 5 TABLET ORAL at 09:33

## 2021-12-21 RX ADMIN — SODIUM CHLORIDE 500 ML: 9 INJECTION, SOLUTION INTRAVENOUS at 12:34

## 2021-12-21 RX ADMIN — CETIRIZINE HYDROCHLORIDE 5 MG: 10 TABLET, FILM COATED ORAL at 09:33

## 2021-12-21 RX ADMIN — ESCITALOPRAM 10 MG: 10 TABLET, FILM COATED ORAL at 09:34

## 2021-12-21 RX ADMIN — LISINOPRIL 20 MG: 10 TABLET ORAL at 09:34

## 2021-12-21 RX ADMIN — METOPROLOL SUCCINATE 25 MG: 25 TABLET, EXTENDED RELEASE ORAL at 09:34

## 2021-12-21 RX ADMIN — CLOPIDOGREL 75 MG: 75 TABLET, FILM COATED ORAL at 09:34

## 2021-12-21 RX ADMIN — METRONIDAZOLE 500 MG: 250 TABLET ORAL at 22:01

## 2021-12-21 RX ADMIN — HYDROCODONE BITARTRATE AND ACETAMINOPHEN 1 TABLET: 7.5; 325 TABLET ORAL at 20:48

## 2021-12-21 RX ADMIN — Medication 1 TABLET: at 09:34

## 2021-12-21 NOTE — PROGRESS NOTES
[facility]  PROGRESS NOTE     Patient Identification:  Name:  Mellisa Sneed  Age:  76 y.o.  Sex:  female  :  1945  MRN:  [id number]  Visit Number:  [id number]  ROOM: [facility]     Primary Care Provider:  Rosario Cates MD    Length of stay in inpatient status:  6    Subjective     Chief Compliant:  No chief complaint on file.      History of Presenting Illness: 76-year-old female with history of CAD with CABG, history of coronary stents, hyperlipidemia, hypertension, mild dementia, recent right hip fracture with right hemiarthroplasty.  Patient had a large loose stool again this morning.  She has had intermittent diarrhea episodes and has had increasing leukocytosis.  No other focus for infection has been found.  Patient slightly more confused this morning.    Objective     Current Hospital Meds:amLODIPine, 5 mg, Oral, Q24H  atorvastatin, 20 mg, Oral, Nightly  cetirizine, 5 mg, Oral, Daily  clopidogrel, 75 mg, Oral, Daily  donepezil, 5 mg, Oral, Nightly  enoxaparin, 40 mg, Subcutaneous, Q24H  escitalopram, 10 mg, Oral, Daily  influenza vaccine, 0.5 mL, Intramuscular, Once  lisinopril, 20 mg, Oral, Q24H  metoprolol succinate XL, 25 mg, Oral, Q24H  metroNIDAZOLE, 500 mg, Oral, Q8H  multivitamin, 1 tablet, Oral, Daily  sodium chloride, 500 mL, Intravenous, Once    Pharmacy Consult,       ----------------------------------------------------------------------------------------------------------------------  Vital Signs:  Temp:  [97.9 °F (36.6 °C)-98 °F (36.7 °C)] 97.9 °F (36.6 °C)  Heart Rate:  [84-89] 84  Resp:  [16-18] 16  BP: (143-152)/(80-85) 143/85  SpO2:  [95 %-96 %] 95 %  on   ;   Device (Oxygen Therapy): room air  Body mass index is 24.67 kg/m².    Wt Readings from Last 3 Encounters:   12/15/21 51.7 kg (114 lb)     Intake & Output (last 3 days)        0701   0700  0701   0700  0701   0700  0701   0700    P.O. 680 137 297 120    Total Intake(mL/kg) 680  (13.2) 680 (13.2) 720 (13.9) 120 (2.3)    Net +680 +680 +720 +120            Urine Unmeasured Occurrence 6 x 5 x 5 x     Stool Unmeasured Occurrence 2 x 3 x 2 x     Emesis Unmeasured Occurrence  1 x          Diet Regular  ----------------------------------------------------------------------------------------------------------------------  Physical exam:  Constitutional:   No acute distress  HEENT: Normocephalic atraumatic  Neck: Supple   Cardiovascular:    Pulmonary/Chest: Clear to auscultation  Abdominal:  .  Positive bowel sounds soft  Musculoskeletal: Right hip status post repair--incision site clean  Neurological: Slightly confused but no focal deficits  Skin: No rash  Peripheral vascular:  Genitourinary:  ----------------------------------------------------------------------------------------------------------------------    Last echocardiogram:    ----------------------------------------------------------------------------------------------------------------------  Results from last 7 days   Lab Units 12/21/21  0701 12/21/21  0021 12/20/21 0023 12/19/21 0134   CRP mg/dL 3.16*  --   --   --    LACTATE mmol/L 1.0  --   --   --    WBC 10*3/mm3  --  15.04* 13.27* 12.20*   HEMOGLOBIN g/dL  --  10.7* 10.4* 9.8*   HEMATOCRIT %  --  33.7* 31.9* 30.2*   MCV fL  --  96.8 95.5 94.4   MCHC g/dL  --  31.8 32.6 32.5   PLATELETS 10*3/mm3  --  509* 455* 399         Results from last 7 days   Lab Units 12/21/21  0021 12/20/21  1855 12/20/21 0023 12/19/21 0134 12/19/21 0134   SODIUM mmol/L 138  --  137  --  139   POTASSIUM mmol/L 4.6 4.9 3.4*   < > 3.7   CHLORIDE mmol/L 107  --  105  --  109*   CO2 mmol/L 18.1*  --  19.1*  --  19.2*   BUN mg/dL 15  --  15  --  17   CREATININE mg/dL 1.07*  --  0.93  --  0.94   EGFR IF NONAFRICN AM mL/min/1.73 50*  --  58*  --  58*   CALCIUM mg/dL 9.2  --  8.7  --  8.8   GLUCOSE mg/dL 115*  --  132*  --  112*    < > = values in this interval not displayed.   Estimated Creatinine Clearance:  35.4 mL/min (A) (by C-G formula based on SCr of 1.07 mg/dL (H)).  No results found for: AMMONIA              No results found for: HGBA1C, POCGLU  No results found for: TSH, FREET4  No results found for: PREGTESTUR, PREGSERUM, HCG, HCGQUANT  Pain Management Panel    There is no flowsheet data to display.       Brief Urine Lab Results  (Last result in the past 365 days)      Color   Clarity   Blood   Leuk Est   Nitrite   Protein   CREAT   Urine HCG        12/20/21 1613 Dark Yellow   Clear   Negative   Negative   Negative   30 mg/dL (1+)               No results found for: BLOODCX  Results from last 7 days   Lab Units 12/20/21  1613   NITRITE UA  Negative   WBC UA /HPF 3-5*   BACTERIA UA /HPF 1+*   SQUAM EPITHEL UA /HPF 3-6*     No results found for: URINECX  No results found for: WOUNDCX  No results found for: STOOLCX  Results from last 7 days   Lab Units 12/21/21  0701   LACTATE mmol/L 1.0   CRP mg/dL 3.16*       I have personally looked at the labs and they are summarized above.  ----------------------------------------------------------------------------------------------------------------------  Detailed radiology reports for the last 24 hours:    Imaging Results (Last 24 Hours)     Procedure Component Value Units Date/Time    XR Abdomen 2+ VW with Chest 1 VW [[id number]] Collected: 12/21/21 1136     Updated: 12/21/21 1145    Narrative:      XR ABDOMEN 2+ VIEWS W CHEST 1 VW-     CLINICAL INDICATION: Sepsis.  N/v.  Cough        COMPARISON: None available      FINDINGS:  Chest:  The included view of the chest demonstrates the lungs to be well  aerated. There is no focal alveolar infiltrate or pleural effusion. The  cardiac silhouette is normal. No acute osseous abnormality is seen  within the chest.     Abdomen:  Two views of the abdomen show no free air. I do not see abnormal bowel  distention to suggest complete obstruction. The bony structures are  intact. No abnormal soft tissue masses are seen. No suspicious  appearing  calcifications are identified on today's exam.       Impression:      1. Nothing specific seen on today's exam to account for the patient's  symptoms.  2. The lungs are clear.  3. No evidence of bowel obstruction.  4. No free air.     This report was finalized on 12/21/2021 11:36 AM by Dr. Chemo Guy MD.           Final impressions for the last 30 days of radiology reports:    XR Abdomen 2+ VW with Chest 1 VW    Result Date: 12/21/2021  1. Nothing specific seen on today's exam to account for the patient's symptoms. 2. The lungs are clear. 3. No evidence of bowel obstruction. 4. No free air.  This report was finalized on 12/21/2021 11:36 AM by Dr. Chemo Guy MD.      I have personally looked at the radiology images and read the final radiology report.    Assessment & Plan    Status post hip fracture with right hip hemiarthroplasty--patient requiring minimum assist for up with transfers; ambulated 170 feet x 2 yesterday morning along with 170 feet, 150 feet and 60 feet in the afternoon.  Patient using front wheel walker with contact-guard.  Requiring minimum assistance with transfers.  Continues to work on motor skills and requires set up for grooming.    Diarrhea--we will place empirically on p.o. Flagyl.  Stool studies pending    Leukocytosis--may be secondary to acute colitis.  Urinalysis was not impressive, chest x-ray negative.  Will monitor closely    CAD--stable    Hypertension better controlled today.    Dementia continue Aricept    VTE Prophylaxis:   Mechanical Order History:     None      Pharmalogical Order History:      Ordered     Dose Route Frequency Stop    12/15/21 1445  enoxaparin (LOVENOX) syringe 40 mg         40 mg SC Every 24 Hours 01/04/22 3673                    Dewey Campos MD  [facility]  12/21/21  12:29 EST

## 2021-12-21 NOTE — THERAPY TREATMENT NOTE
Inpatient Rehabilitation - Occupational Therapy Treatment Note     [place]     Patient Name: Mellisa Sneed  : 1945  MRN: [id number]    Today's Date: 2021                 Admit Date: 12/15/2021       No diagnosis found.    Patient Active Problem List   Diagnosis   • S/P right hip fracture       Past Medical History:   Diagnosis Date   • Arthritis    • CHF (congestive heart failure) (HCC)    • Hypertension        Past Surgical History:   Procedure Laterality Date   • CARDIAC SURGERY     • FRACTURE SURGERY               IRF OT ASSESSMENT FLOWSHEET (last 12 hours)     IRF OT Evaluation and Treatment     Row Name 21 1504          OT Time and Intention    Document Type daily treatment  -TM     Mode of Treatment occupational therapy  -TM     Patient Effort adequate  -TM     Symptoms Noted During/After Treatment none  -TM     Row Name 21 1505          General Information    General Observations of Patient Pt agreeable for therapy.  -TM     Existing Precautions/Restrictions fall; hip  -TM     Row Name 21 1504          Cognition/Psychosocial    Orientation Status (Cognition) oriented to; person; situation; verbal cues/prompts needed for orientation; other (see comments)  confusion at times  -TM     Follows Commands (Cognition) follows one-step commands; verbal cues/prompting required; repetition of directions required  -TM     Row Name 21 1504          Transfers    Chair-Bed [place] (Transfers) minimum assist (75% patient effort); verbal cues  -TM     [place] Level (Toilet Transfer) minimum assist (75% patient effort); verbal cues  -TM     Assistive Device (Toilet Transfer) raised toilet seat; grab bars/safety frame; wheelchair  -TM     Row Name 21 1504          Chair-Bed Transfer    Assistive Device (Chair-Bed Transfers) wheelchair  -TM     Row Name 21 1505          Toilet Transfer    Type (Toilet Transfer) stand pivot/stand step  -TM     Row Name 21 1502           Motor Skills    Motor Control/Coordination Interventions therapeutic exercise/ROM; fine motor manipulation/dexterity activities; gross motor coordination activities; other (see comments)  BUE ther ex/act, GMC/FMC  -TM     Row Name 12/21/21 1504          Grooming    [place] Level (Grooming) set up  -TM     Position (Grooming) supported sitting  -TM     Row Name 12/21/21 1504          Toileting    [place] Level (Toileting) maximum assist (25% patient effort); verbal cues  -TM     Assistive Device Use (Toileting) raised toilet seat; grab bar/safety frame  -TM     Position (Toileting) supported sitting  -TM           User Key  (r) = Recorded By, (t) = Taken By, (c) = Cosigned By    Initials Name Effective Dates    Sherry Reagan, CHELSEY 06/16/21 -                  Occupational Therapy Education                 Title: PT OT SLP Therapies (Done)     Topic: Occupational Therapy (Done)     Point: ADL training (Done)     Description:   Instruct learner(s) on proper safety adaptation and remediation techniques during self care or transfers.   Instruct in proper use of assistive devices.              Learning Progress Summary           Patient Acceptance, E,D, VU,NR by TM at 12/21/2021 1504    Acceptance, E,D, VU,NR by TM at 12/20/2021 0947    Acceptance, D,E, NR by AS at 12/18/2021 0923    Acceptance, E,D, VU,NR by TM at 12/17/2021 1313    Acceptance, E,D, VU,NR by  at 12/16/2021 1522    Acceptance, E,D, VU,NR by TM at 12/16/2021 1126    Acceptance, E,D, VU,NR by TM at 12/16/2021 1126                   Point: Precautions (Done)     Description:   Instruct learner(s) on prescribed precautions during self-care and functional transfers.              Learning Progress Summary           Patient Acceptance, E,D, VU,NR by TM at 12/21/2021 1504    Acceptance, E,D, VU,NR by TM at 12/20/2021 0947    Acceptance, D,E, NR by AS at 12/18/2021 0923    Acceptance, E,D, VU,NR by TM at 12/17/2021 1313    Acceptance,  E,D, VU,NR by  at 12/16/2021 1522    Acceptance, E,D, VU,NR by TM at 12/16/2021 1126    Acceptance, E,D, VU,NR by TM at 12/16/2021 1126                               User Key     Initials Effective Dates Name Provider Type Discipline     06/16/21 -  Khalida Glover, OT Occupational Therapist OT    TM 06/16/21 -  Sherry Varela, OT Occupational Therapist OT    AS 06/16/21 -  Tabitha Juarez, OT Occupational Therapist OT                    OT Recommendation and Plan    Planned Therapy Interventions (OT): activity tolerance training, adaptive equipment training, BADL retraining, IADL retraining, ROM/therapeutic exercise, strengthening exercise, transfer/mobility retraining, occupation/activity based interventions, patient/caregiver education/training                    Time Calculation:      Time Calculation- OT     Row Name 12/21/21 1504 12/21/21 1503          Time Calculation- OT    OT Start Time 1330  -TM 0915  -TM     OT Stop Time 1425  -TM 1000  -TM     OT Time Calculation (min) 55 min  -TM 45 min  -TM     Total Timed Code Minutes- OT 55 minute(s)  -TM 45 minute(s)  -TM     OT Non-Billable Time (min) -- 15 min  -TM           User Key  (r) = Recorded By, (t) = Taken By, (c) = Cosigned By    Initials Name Provider Type    TM Sherry Varela, CHELSEY Occupational Therapist              Therapy Charges for Today     Code Description Service Date Service Provider Modifiers Qty    81671316726 HC OT SELF CARE/MGMT/TRAIN EA 15 MIN 12/20/2021 Sherry Varela, OT GO 1    30656853727 HC OT THERAPEUTIC ACT EA 15 MIN 12/20/2021 Sherry Varela, OT GO 2    62953871045 HC OT THERAPEUTIC ACT EA 15 MIN 12/20/2021 Sherry Varela, OT GO 1    77983404000 HC OT THER PROC EA 15 MIN 12/20/2021 Sherry Varela, OT GO 2    47662166940 HC OT SELF CARE/MGMT/TRAIN EA 15 MIN 12/21/2021 Sherry Varela, OT GO 2    52272726847 HC OT THERAPEUTIC ACT EA 15 MIN 12/21/2021 Sherry Varela, OT GO 3     [id number]  OT THER PROC EA 15 MIN 12/21/2021 Sherry Varela, CHELSEY GO 2                   Sherry Varela OT  12/21/2021

## 2021-12-21 NOTE — SIGNIFICANT NOTE
12/21/21 1427   Plan   Plan Team conference was held this AM and discharge date is planned for 12/29/21.  Pt will go home with son Jose R Sneed and his ex-wife Magaly Sneed at discharge.  Spoke to pt and son Jose R about how she's doing in therapy and discharge date of 12/29 and they are agreeable.  SS will continue to follow for discharge planning needs.

## 2021-12-21 NOTE — THERAPY TREATMENT NOTE
Inpatient Rehabilitation - Physical Therapy Treatment Note       MIAN Pizano     Patient Name: Mellisa Sneed  : 1945  MRN: [id number]    Today's Date: 2021                    Admit Date: 12/15/2021      Visit Dx:   No diagnosis found.    Patient Active Problem List   Diagnosis   • S/P right hip fracture       Past Medical History:   Diagnosis Date   • Arthritis    • CHF (congestive heart failure) (HCC)    • Hypertension        Past Surgical History:   Procedure Laterality Date   • CARDIAC SURGERY     • FRACTURE SURGERY         PT ASSESSMENT (last 12 hours)     IRF PT Evaluation and Treatment     Row Name 21 1308          PT Time and Intention    Document Type daily treatment  BID treatment session  -LL     Mode of Treatment individual therapy; physical therapy  -LL     Patient/Family/Caregiver Comments/Observations Patient agreeable to PT session  -LL     Row Name 21 1308          General Information    Existing Precautions/Restrictions fall; right; hip  history of dementia  -LL     Row Name 21 1303          Cognition/Psychosocial    Affect/Mental Status (Cognitive) --  alert and cooperative, confusion noted  -     Orientation Status (Cognition) oriented to; person; situation; verbal cues/prompts needed for orientation; other (see comments)  -LL     Follows Commands (Cognition) verbal cues/prompting required; physical/tactile prompts required  -LL     Personal Safety Interventions gait belt; nonskid shoes/slippers when out of bed; supervised activity  -LL     Row Name 21 1309          Pain Scale: FACES Pre/Post-Treatment    Pain: FACES Scale, Pretreatment 6-->hurts even more  -LL     Posttreatment Pain Rating 6-->hurts even more  -LL     Row Name 21 1301          Mobility    Left Lower Extremity (Weight-bearing Status) weight-bearing as tolerated (WBAT)  -LL     Right Lower Extremity (Weight-bearing Status) weight-bearing as tolerated (WBAT)  -     Row Name 21 1300           Bed Mobility    Supine-Sit [place] (Bed Mobility) minimum assist (75% patient effort); verbal cues; nonverbal cues (demo/gesture)  -LL     Assistive Device (Bed Mobility) bed rails  -LL     Row Name 12/21/21 1308          Transfers    Bed-Chair [place] (Transfers) minimum assist (75% patient effort); verbal cues  -LL     Chair-Bed [place] (Transfers) minimum assist (75% patient effort); verbal cues  -LL     Assistive Device (Bed-Chair Transfers) wheelchair; walker, front-wheeled  -LL     Sit-Stand [place] (Transfers) minimum assist (75% patient effort); verbal cues; nonverbal cues (demo/gesture)  -LL     Stand-Sit [place] (Transfers) minimum assist (75% patient effort); verbal cues; nonverbal cues (demo/gesture)  -LL     [place] Level (Toilet Transfer) minimum assist (75% patient effort); verbal cues  -LL     Assistive Device (Toilet Transfer) raised toilet seat; grab bars/safety frame; wheelchair  -LL     Row Name 12/21/21 1308          Chair-Bed Transfer    Assistive Device (Chair-Bed Transfers) wheelchair  -LL     Row Name 12/21/21 1308          Sit-Stand Transfer    Assistive Device (Sit-Stand Transfers) walker, front-wheeled  -LL     Row Name 12/21/21 1308          Stand-Sit Transfer    Assistive Device (Stand-Sit Transfers) walker, front-wheeled  -LL     Row Name 12/21/21 1308          Toilet Transfer    Type (Toilet Transfer) stand pivot/stand step  -LL     Row Name 12/21/21 1308          Gait/Stairs (Locomotion)    [place] Level (Gait) contact guard; verbal cues; nonverbal cues (demo/gesture)  -LL     Assistive Device (Gait) walker, front-wheeled  -LL     Distance in Feet (Gait) 100', 60', 110'  -LL     Pattern (Gait) step-to  -LL     Deviations/Abnormal Patterns (Gait) antalgic; luis decreased; gait speed decreased; stride length decreased  -     Bilateral Gait Deviations forward flexed posture; weight shift ability decreased  -     Row Name 12/21/21 1308           Safety Issues, Functional Mobility    Impairments Affecting Function (Mobility) balance; cognition; endurance/activity tolerance; pain; range of motion (ROM); strength  -LL     Row Name 12/21/21 1308          Balance    Comment, Balance Side stepping in // bars  -LL     Row Name 12/21/21 1308          Hip (Therapeutic Exercise)    Hip Strengthening (Therapeutic Exercise) bilateral; flexion; extension; aBduction; aDduction; marching while seated; marching while standing; sitting; standing; resistance band; green; 1 lb free weight  -LL     Row Name 12/21/21 1308          Knee (Therapeutic Exercise)    Knee Strengthening (Therapeutic Exercise) bilateral; flexion; extension; marching while seated; marching while standing; LAQ (long arc quad); hamstring curls; sitting; standing; 1 lb free weight; resistance band; green  -LL     Row Name 12/21/21 1308          Ankle (Therapeutic Exercise)    Ankle Strengthening (Therapeutic Exercise) bilateral; dorsiflexion; plantarflexion; sitting; standing  -LL     Row Name 12/21/21 1308          IRF PT Goals    Bed Mobility Goal Selection (PT-IRF) bed mobility, PT goal 1  -LL     Transfer Goal Selection (PT-IRF) transfers, PT goal 1  -LL     Gait (Walking Locomotion) Goal Selection (PT-IRF) gait, PT goal 1  -LL     Row Name 12/21/21 1308          Bed Mobility Goal 1 (PT-IRF)    Activity/Assistive Device (Bed Mobility Goal 1, PT-IRF) sit to supine/supine to sit  -LL     [place] Level (Bed Mobility Goal 1, PT-IRF) independent  -LL     Time Frame (Bed Mobility Goal 1, PT-IRF) by discharge  -     Row Name 12/21/21 1308          Transfer Goal 1 (PT-IRF)    Activity/Assistive Device (Transfer Goal 1, PT-IRF) sit-to-stand/stand-to-sit; bed-to-chair/chair-to-bed  -LL     [place] Level (Transfer Goal 1, PT-IRF) supervision required  -LL     Time Frame (Transfer Goal 1, PT-IRF) by discharge  -LL     Row Name 12/21/21 1308          Gait/Walking Locomotion Goal 1 (PT-IRF)     Activity/Assistive Device (Gait/Walking Locomotion Goal 1, PT-IRF) walker, rolling  -LL     Gait/Walking Locomotion Distance Goal 1 (PT-IRF) 300'  -LL     [place] Level (Gait/Walking Locomotion Goal 1, PT-IRF) supervision required  -LL     Time Frame (Gait/Walking Locomotion Goal 1, PT-IRF) by discharge  -LL     Row Name 12/21/21 1308          Positioning and Restraints    Pre-Treatment Position --  WC with OT  -LL     Post Treatment Position wheelchair  -LL     In Wheelchair sitting; call light within reach; encouraged to call for assist; legs elevated  -LL     Row Name 12/21/21 1308          Therapy Assessment/Plan (PT)    Patient's Goals For Discharge return home  -     Row Name 12/21/21 1308          Therapy Assessment/Plan (PT)    Rehab Potential/Prognosis (PT) adequate, monitor progress closely  -LL     Frequency of Treatment (PT) 5 times per week  -LL     Estimated Duration of Therapy (PT) 2 weeks  -LL     Problem List (PT) balance; cognition; mobility; range of motion (ROM); strength; pain  -LL     Activity Limitations Related to Problem List (PT) unable to ambulate safely; unable to transfer safely  -     Row Name 12/21/21 1308          Therapy Plan Review/Discharge Plan (PT)    Anticipated Equipment Needs at Discharge (PT Eval) --  tbd  -LL     Expected Discharge Disposition (PT Eval) home with home health care; home with caregiver  -           User Key  (r) = Recorded By, (t) = Taken By, (c) = Cosigned By    Initials Name Provider Type    LL Casie Hernandez PTA Physical Therapy Assistant              Wound 12/15/21 1530 Right anterior greater trochanter (Active)   Dressing Appearance intact 12/20/21 1910   Closure RAMON 12/20/21 1910   Base dressing in place, unable to visualize 12/21/21 1040   Periwound edematous 12/21/21 1040   Drainage Characteristics/Odor serosanguineous 12/21/21 1040   Drainage Amount small 12/21/21 1040   Dressing Care other (see comments) 12/20/21 1910     Physical  Therapy Education                 Title: PT OT SLP Therapies (Done)     Topic: Physical Therapy (Done)     Point: Mobility training (Done)     Learning Progress Summary           Patient Acceptance, E,D, VU,NR by LL at 12/21/2021 1312    Acceptance, E,D, VU,NR by LL at 12/20/2021 1448    Acceptance, E,D, VU,NR by LL at 12/18/2021 1255    Acceptance, E,D, VU,NR by LL at 12/17/2021 1950    Acceptance, E, VU,NR by LB at 12/16/2021 1401                   Point: Home exercise program (Done)     Learning Progress Summary           Patient Acceptance, E,D, VU,NR by LL at 12/21/2021 1312    Acceptance, E,D, VU,NR by LL at 12/20/2021 1448    Acceptance, E,D, VU,NR by LL at 12/18/2021 1255    Acceptance, E,D, VU,NR by LL at 12/17/2021 1950    Acceptance, E, VU,NR by LB at 12/16/2021 1401                   Point: Body mechanics (Done)     Learning Progress Summary           Patient Acceptance, E,D, VU,NR by LL at 12/21/2021 1312    Acceptance, E,D, VU,NR by LL at 12/20/2021 1448    Acceptance, E,D, VU,NR by LL at 12/18/2021 1255    Acceptance, E,D, VU,NR by LL at 12/17/2021 1950    Acceptance, E, VU,NR by LB at 12/16/2021 1401                   Point: Precautions (Done)     Learning Progress Summary           Patient Acceptance, E,D, VU,NR by LL at 12/21/2021 1312    Acceptance, E,D, VU,NR by LL at 12/20/2021 1448    Acceptance, E,D, VU,NR by LL at 12/18/2021 1255    Acceptance, E,D, VU,NR by LL at 12/17/2021 1950    Acceptance, E, VU,NR by LB at 12/16/2021 1401                               User Key     Initials Effective Dates Name Provider Type Discipline    LB 06/16/21 -  Juanita Perez, PT Physical Therapist PT    LL 05/02/16 -  Casie Hernandez PTA Physical Therapy Assistant PT                PT Recommendation and Plan    Frequency of Treatment (PT): 5 times per week  Anticipated Equipment Needs at Discharge (PT Eval):  (tbd)                  Time Calculation:      PT Charges     Row Name 12/21/21 3826              Time Calculation    Start Time 1000  -LL      Stop Time 1130  -LL      Time Calculation (min) 90 min  -LL      PT Received On 12/21/21  -LL              Time Calculation- PT    Total Timed Code Minutes- PT 90 minute(s)  -LL            User Key  (r) = Recorded By, (t) = Taken By, (c) = Cosigned By    Initials Name Provider Type    LL Casie Hernandez PTA Physical Therapy Assistant                Therapy Charges for Today     Code Description Service Date Service Provider Modifiers Qty    14484167299 HC GAIT TRAINING EA 15 MIN 12/20/2021 Casie Hernandez, JAMARI GP 4    33347495457 HC PT THER PROC EA 15 MIN 12/20/2021 Casie Hernandez, JAMARI GP 3    15807422073 HC GAIT TRAINING EA 15 MIN 12/21/2021 Casie Hernandez, JAMARI GP 2    66459177121 HC PT THERAPEUTIC ACT EA 15 MIN 12/21/2021 Casie Hernandez, JAMARI GP 1    75194080736 HC PT THER PROC EA 15 MIN 12/21/2021 Casie Hernandez, JAMARI GP 3                   Casie. JAMARI Hernandez  12/21/2021

## 2021-12-22 LAB
ANION GAP SERPL CALCULATED.3IONS-SCNC: 9.3 MMOL/L (ref 5–15)
BASOPHILS # BLD AUTO: 0.12 10*3/MM3 (ref 0–0.2)
BASOPHILS NFR BLD AUTO: 0.9 % (ref 0–1.5)
BUN SERPL-MCNC: 20 MG/DL (ref 8–23)
BUN/CREAT SERPL: 17.7 (ref 7–25)
CALCIUM SPEC-SCNC: 8.6 MG/DL (ref 8.6–10.5)
CHLORIDE SERPL-SCNC: 107 MMOL/L (ref 98–107)
CO2 SERPL-SCNC: 17.7 MMOL/L (ref 22–29)
CREAT SERPL-MCNC: 1.13 MG/DL (ref 0.57–1)
DEPRECATED RDW RBC AUTO: 53.7 FL (ref 37–54)
EOSINOPHIL # BLD AUTO: 0.55 10*3/MM3 (ref 0–0.4)
EOSINOPHIL NFR BLD AUTO: 4.1 % (ref 0.3–6.2)
ERYTHROCYTE [DISTWIDTH] IN BLOOD BY AUTOMATED COUNT: 15.4 % (ref 12.3–15.4)
GFR SERPL CREATININE-BSD FRML MDRD: 47 ML/MIN/1.73
GLUCOSE SERPL-MCNC: 112 MG/DL (ref 65–99)
HCT VFR BLD AUTO: 30 % (ref 34–46.6)
HGB BLD-MCNC: 9.5 G/DL (ref 12–15.9)
IMM GRANULOCYTES # BLD AUTO: 0.08 10*3/MM3 (ref 0–0.05)
IMM GRANULOCYTES NFR BLD AUTO: 0.6 % (ref 0–0.5)
LYMPHOCYTES # BLD AUTO: 3.09 10*3/MM3 (ref 0.7–3.1)
LYMPHOCYTES NFR BLD AUTO: 22.9 % (ref 19.6–45.3)
MCH RBC QN AUTO: 30.5 PG (ref 26.6–33)
MCHC RBC AUTO-ENTMCNC: 31.7 G/DL (ref 31.5–35.7)
MCV RBC AUTO: 96.5 FL (ref 79–97)
MONOCYTES # BLD AUTO: 1 10*3/MM3 (ref 0.1–0.9)
MONOCYTES NFR BLD AUTO: 7.4 % (ref 5–12)
NEUTROPHILS NFR BLD AUTO: 64.1 % (ref 42.7–76)
NEUTROPHILS NFR BLD AUTO: 8.68 10*3/MM3 (ref 1.7–7)
NRBC BLD AUTO-RTO: 0 /100 WBC (ref 0–0.2)
PLATELET # BLD AUTO: 454 10*3/MM3 (ref 140–450)
PMV BLD AUTO: 9.6 FL (ref 6–12)
POTASSIUM SERPL-SCNC: 4.1 MMOL/L (ref 3.5–5.2)
RBC # BLD AUTO: 3.11 10*6/MM3 (ref 3.77–5.28)
SODIUM SERPL-SCNC: 134 MMOL/L (ref 136–145)
WBC NRBC COR # BLD: 13.52 10*3/MM3 (ref 3.4–10.8)

## 2021-12-22 PROCEDURE — 97116 GAIT TRAINING THERAPY: CPT

## 2021-12-22 PROCEDURE — 97110 THERAPEUTIC EXERCISES: CPT

## 2021-12-22 PROCEDURE — 85025 COMPLETE CBC W/AUTO DIFF WBC: CPT | Performed by: FAMILY MEDICINE

## 2021-12-22 PROCEDURE — 97535 SELF CARE MNGMENT TRAINING: CPT

## 2021-12-22 PROCEDURE — 97530 THERAPEUTIC ACTIVITIES: CPT

## 2021-12-22 PROCEDURE — 97112 NEUROMUSCULAR REEDUCATION: CPT

## 2021-12-22 PROCEDURE — 80048 BASIC METABOLIC PNL TOTAL CA: CPT | Performed by: FAMILY MEDICINE

## 2021-12-22 PROCEDURE — 25010000002 ENOXAPARIN PER 10 MG: Performed by: FAMILY MEDICINE

## 2021-12-22 RX ADMIN — DONEPEZIL HYDROCHLORIDE 5 MG: 5 TABLET, FILM COATED ORAL at 21:14

## 2021-12-22 RX ADMIN — METRONIDAZOLE 500 MG: 250 TABLET ORAL at 21:14

## 2021-12-22 RX ADMIN — ATORVASTATIN CALCIUM 20 MG: 20 TABLET, FILM COATED ORAL at 21:14

## 2021-12-22 RX ADMIN — AMLODIPINE BESYLATE 5 MG: 5 TABLET ORAL at 08:23

## 2021-12-22 RX ADMIN — ENOXAPARIN SODIUM 40 MG: 40 INJECTION SUBCUTANEOUS at 17:04

## 2021-12-22 RX ADMIN — LISINOPRIL 20 MG: 10 TABLET ORAL at 08:23

## 2021-12-22 RX ADMIN — METRONIDAZOLE 500 MG: 250 TABLET ORAL at 13:03

## 2021-12-22 RX ADMIN — CLOPIDOGREL 75 MG: 75 TABLET, FILM COATED ORAL at 08:23

## 2021-12-22 RX ADMIN — ESCITALOPRAM 10 MG: 10 TABLET, FILM COATED ORAL at 08:23

## 2021-12-22 RX ADMIN — HYDROCODONE BITARTRATE AND ACETAMINOPHEN 1 TABLET: 7.5; 325 TABLET ORAL at 21:14

## 2021-12-22 RX ADMIN — METRONIDAZOLE 500 MG: 250 TABLET ORAL at 05:06

## 2021-12-22 RX ADMIN — METOPROLOL SUCCINATE 25 MG: 25 TABLET, EXTENDED RELEASE ORAL at 08:23

## 2021-12-22 RX ADMIN — CETIRIZINE HYDROCHLORIDE 5 MG: 10 TABLET, FILM COATED ORAL at 08:23

## 2021-12-22 RX ADMIN — Medication 1 TABLET: at 08:23

## 2021-12-22 NOTE — PROGRESS NOTES
Occupational Therapy: Individual: 115 minutes.    Physical Therapy:    Speech Language Pathology:    Signed by: Sherry Varela, Occupational Therapist

## 2021-12-22 NOTE — PLAN OF CARE
Problem: Rehabilitation (IRF) Plan of Care  Goal: Plan of Care Review  Outcome: Ongoing, Progressing  Flowsheets  Taken 12/22/2021 0317 by Grace vOiedo RN  Outcome Summary:   pt calm and cooperative   resting well throughout the night   continue with care plan  Plan of Care Reviewed With: patient  Taken 12/21/2021 1057 by Agnes Morocho RN  Progress: no change  Goal: Patient-Specific Goal (Individualized)  Outcome: Ongoing, Progressing  Goal: Absence of New-Onset Illness or Injury  Outcome: Ongoing, Progressing  Intervention: Prevent Fall and Fall Injury  Recent Flowsheet Documentation  Taken 12/22/2021 0200 by Grace Oviedo RN  Safety Promotion/Fall Prevention: safety round/check completed  Taken 12/22/2021 0000 by Grace Oviedo RN  Safety Promotion/Fall Prevention: safety round/check completed  Taken 12/21/2021 2200 by Grace Oviedo RN  Safety Promotion/Fall Prevention: safety round/check completed  Taken 12/21/2021 2000 by Grace Oviedo RN  Safety Promotion/Fall Prevention: safety round/check completed  Intervention: Prevent VTE (Venous Thromboembolism)  Recent Flowsheet Documentation  Taken 12/21/2021 2048 by Grace Oviedo, RN  VTE Prevention/Management: bleeding risk factor(s) identified  Goal: Optimal Comfort and Wellbeing  Outcome: Ongoing, Progressing  Goal: Home and Community Transition Plan Established  Outcome: Ongoing, Progressing   Goal Outcome Evaluation:  Plan of Care Reviewed With: patient           Outcome Summary: pt calm and cooperative; resting well throughout the night; continue with care plan

## 2021-12-22 NOTE — THERAPY TREATMENT NOTE
Inpatient Rehabilitation - Physical Therapy Treatment Note       MIAN Pizano     Patient Name: Mellisa Sneed  : 1945  MRN: [id number]    Today's Date: 2021                    Admit Date: 12/15/2021      Visit Dx:   No diagnosis found.    Patient Active Problem List   Diagnosis   • S/P right hip fracture       Past Medical History:   Diagnosis Date   • Arthritis    • CHF (congestive heart failure) (HCC)    • Hypertension        Past Surgical History:   Procedure Laterality Date   • CARDIAC SURGERY     • FRACTURE SURGERY         PT ASSESSMENT (last 12 hours)     IRF PT Evaluation and Treatment     Row Name 21 1545          PT Time and Intention    Document Type daily treatment  -LL     Mode of Treatment individual therapy; physical therapy  -LL     Patient/Family/Caregiver Comments/Observations Patient agreeable to PT session this date.  -LL     Row Name 21 1545          General Information    Existing Precautions/Restrictions fall; right; hip  history of dementia  -LL     Row Name 21 1545          Cognition/Psychosocial    Affect/Mental Status (Cognitive) --  alert and cooperative, confusion noted  -LL     Orientation Status (Cognition) oriented to; person; situation; verbal cues/prompts needed for orientation; other (see comments)  -LL     Follows Commands (Cognition) verbal cues/prompting required; physical/tactile prompts required  -LL     Personal Safety Interventions gait belt; nonskid shoes/slippers when out of bed; supervised activity  -LL     Row Name 21 1545          Pain Scale: FACES Pre/Post-Treatment    Pain: FACES Scale, Pretreatment 4-->hurts little more  -LL     Posttreatment Pain Rating 4-->hurts little more  -LL     Row Name 21 1545          Mobility    Left Lower Extremity (Weight-bearing Status) weight-bearing as tolerated (WBAT)  -LL     Right Lower Extremity (Weight-bearing Status) weight-bearing as tolerated (WBAT)  -     Row Name 21 1545           Bed Mobility    Supine-Sit [place] (Bed Mobility) minimum assist (75% patient effort); verbal cues; nonverbal cues (demo/gesture)  -LL     Assistive Device (Bed Mobility) bed rails  -LL     Row Name 12/22/21 1545          Transfers    Bed-Chair [place] (Transfers) minimum assist (75% patient effort); verbal cues  -LL     Chair-Bed [place] (Transfers) minimum assist (75% patient effort); verbal cues  -LL     Assistive Device (Bed-Chair Transfers) wheelchair; walker, front-wheeled  -LL     Sit-Stand [place] (Transfers) minimum assist (75% patient effort); verbal cues; nonverbal cues (demo/gesture); contact guard  -LL     Stand-Sit [place] (Transfers) minimum assist (75% patient effort); verbal cues; nonverbal cues (demo/gesture); contact guard  -LL     [place] Level (Toilet Transfer) minimum assist (75% patient effort); verbal cues; contact guard  -LL     Assistive Device (Toilet Transfer) raised toilet seat; grab bars/safety frame; wheelchair  -LL     Row Name 12/22/21 1545          Chair-Bed Transfer    Assistive Device (Chair-Bed Transfers) wheelchair  -LL     Row Name 12/22/21 1545          Sit-Stand Transfer    Assistive Device (Sit-Stand Transfers) walker, front-wheeled  -LL     Row Name 12/22/21 1545          Stand-Sit Transfer    Assistive Device (Stand-Sit Transfers) walker, front-wheeled  -LL     Row Name 12/22/21 1545          Toilet Transfer    Type (Toilet Transfer) stand pivot/stand step  -LL     Row Name 12/22/21 1545          Gait/Stairs (Locomotion)    [place] Level (Gait) contact guard; verbal cues; nonverbal cues (demo/gesture)  -LL     Assistive Device (Gait) walker, front-wheeled  -LL     Distance in Feet (Gait) 160', 100'  -LL     Pattern (Gait) step-to  -LL     Deviations/Abnormal Patterns (Gait) antalgic; luis decreased; gait speed decreased; stride length decreased  -LL     Bilateral Gait Deviations forward flexed posture; weight shift ability decreased   -LL     Row Name 12/22/21 1545          Safety Issues, Functional Mobility    Impairments Affecting Function (Mobility) balance; cognition; endurance/activity tolerance; pain; range of motion (ROM); strength  -     Row Name 12/22/21 1545          Balance    Comment, Balance Standing bean bag toss in // bars  -     Row Name 12/22/21 1545          Hip (Therapeutic Exercise)    Hip Strengthening (Therapeutic Exercise) bilateral; flexion; extension; aBduction; aDduction; marching while seated; marching while standing; sitting; standing; 1 lb free weight; resistance band; green  -     Row Name 12/22/21 1545          Knee (Therapeutic Exercise)    Knee Strengthening (Therapeutic Exercise) bilateral; flexion; extension; marching while seated; LAQ (long arc quad); hamstring curls; sitting; standing; 1 lb free weight; resistance band; green  -     Row Name 12/22/21 1545          Ankle (Therapeutic Exercise)    Ankle Strengthening (Therapeutic Exercise) bilateral; dorsiflexion; plantarflexion; sitting; standing  -LL     Row Name 12/22/21 1545          IRF PT Goals    Bed Mobility Goal Selection (PT-IRF) bed mobility, PT goal 1  -LL     Transfer Goal Selection (PT-IRF) transfers, PT goal 1  -LL     Gait (Walking Locomotion) Goal Selection (PT-IRF) gait, PT goal 1  -LL     Row Name 12/22/21 1545          Bed Mobility Goal 1 (PT-IRF)    Activity/Assistive Device (Bed Mobility Goal 1, PT-IRF) sit to supine/supine to sit  -LL     [place] Level (Bed Mobility Goal 1, PT-IRF) independent  -LL     Time Frame (Bed Mobility Goal 1, PT-IRF) by discharge  -     Row Name 12/22/21 1545          Transfer Goal 1 (PT-IRF)    Activity/Assistive Device (Transfer Goal 1, PT-IRF) sit-to-stand/stand-to-sit; bed-to-chair/chair-to-bed  -LL     [place] Level (Transfer Goal 1, PT-IRF) supervision required  -LL     Time Frame (Transfer Goal 1, PT-IRF) by discharge  -     Row Name 12/22/21 1545          Gait/Walking Locomotion Goal  1 (PT-IRF)    Activity/Assistive Device (Gait/Walking Locomotion Goal 1, PT-IRF) walker, rolling  -LL     Gait/Walking Locomotion Distance Goal 1 (PT-IRF) 300'  -LL     [place] Level (Gait/Walking Locomotion Goal 1, PT-IRF) supervision required  -LL     Time Frame (Gait/Walking Locomotion Goal 1, PT-IRF) by discharge  -     Row Name 12/22/21 1545          Positioning and Restraints    Pre-Treatment Position --  WC  -LL     Post Treatment Position wheelchair  -LL     In Wheelchair sitting; notified nsg; legs elevated  In hallway in front of nurses station  -     Row Name 12/22/21 1545          Therapy Assessment/Plan (PT)    Patient's Goals For Discharge return home  -     Row Name 12/22/21 1545          Therapy Assessment/Plan (PT)    Rehab Potential/Prognosis (PT) adequate, monitor progress closely  -     Frequency of Treatment (PT) 5 times per week  -     Estimated Duration of Therapy (PT) 2 weeks  -     Problem List (PT) balance; cognition; mobility; range of motion (ROM); strength; pain  -LL     Activity Limitations Related to Problem List (PT) unable to ambulate safely; unable to transfer safely  -     Row Name 12/22/21 1545          Therapy Plan Review/Discharge Plan (PT)    Anticipated Equipment Needs at Discharge (PT Eval) --  tbd  -LL     Expected Discharge Disposition (PT Eval) home with home health care; home with caregiver  -           User Key  (r) = Recorded By, (t) = Taken By, (c) = Cosigned By    Initials Name Provider Type    LL Casie Hernandez PTA Physical Therapy Assistant              Wound 12/15/21 1530 Right anterior greater trochanter (Active)   Dressing Appearance intact; dry 12/22/21 0710   Closure RAMON 12/21/21 2048   Base dressing in place, unable to visualize 12/21/21 2048   Periwound edematous 12/21/21 2048   Drainage Characteristics/Odor serosanguineous 12/21/21 2048   Drainage Amount small 12/21/21 2048     Physical Therapy Education                 Title: PT OT  SLP Therapies (Done)     Topic: Physical Therapy (Done)     Point: Mobility training (Done)     Learning Progress Summary           Patient Acceptance, E,D, VU,NR by LL at 12/22/2021 1550    Acceptance, E,D, VU,NR by LL at 12/21/2021 1312    Acceptance, E,D, VU,NR by LL at 12/20/2021 1448    Acceptance, E,D, VU,NR by LL at 12/18/2021 1255    Acceptance, E,D, VU,NR by LL at 12/17/2021 1950    Acceptance, E, VU,NR by LB at 12/16/2021 1401                   Point: Home exercise program (Done)     Learning Progress Summary           Patient Acceptance, E,D, VU,NR by LL at 12/22/2021 1550    Acceptance, E,D, VU,NR by LL at 12/21/2021 1312    Acceptance, E,D, VU,NR by LL at 12/20/2021 1448    Acceptance, E,D, VU,NR by LL at 12/18/2021 1255    Acceptance, E,D, VU,NR by LL at 12/17/2021 1950    Acceptance, E, VU,NR by LB at 12/16/2021 1401                   Point: Body mechanics (Done)     Learning Progress Summary           Patient Acceptance, E,D, VU,NR by LL at 12/22/2021 1550    Acceptance, E,D, VU,NR by LL at 12/21/2021 1312    Acceptance, E,D, VU,NR by LL at 12/20/2021 1448    Acceptance, E,D, VU,NR by LL at 12/18/2021 1255    Acceptance, E,D, VU,NR by LL at 12/17/2021 1950    Acceptance, E, VU,NR by LB at 12/16/2021 1401                   Point: Precautions (Done)     Learning Progress Summary           Patient Acceptance, E,D, VU,NR by LL at 12/22/2021 1550    Acceptance, E,D, VU,NR by LL at 12/21/2021 1312    Acceptance, E,D, VU,NR by LL at 12/20/2021 1448    Acceptance, E,D, VU,NR by LL at 12/18/2021 1255    Acceptance, E,D, VU,NR by LL at 12/17/2021 1950    Acceptance, E, COSME,NR by CARMELINA at 12/16/2021 1401                               User Key     Initials Effective Dates Name Provider Type Discipline    CARMELINA 06/16/21 -  Juanita Perez, PT Physical Therapist PT    LL 05/02/16 -  Casie Hernandez PTA Physical Therapy Assistant PT                PT Recommendation and Plan    Frequency of Treatment (PT): 5 times  per week  Anticipated Equipment Needs at Discharge (PT Eval):  (tbd)                  Time Calculation:      PT Charges     Row Name 12/22/21 1550             Time Calculation    Start Time 1000  -LL      Stop Time 1140  -LL      Time Calculation (min) 100 min  -LL      PT Received On 12/22/21  -LL              Time Calculation- PT    Total Timed Code Minutes-  minute(s)  -LL            User Key  (r) = Recorded By, (t) = Taken By, (c) = Cosigned By    Initials Name Provider Type    LL Casie Hernandez PTA Physical Therapy Assistant                Therapy Charges for Today     Code Description Service Date Service Provider Modifiers Qty    37739254632 HC GAIT TRAINING EA 15 MIN 12/21/2021 Casie Hernandez, JAMARI GP 2    48846255417 HC PT THERAPEUTIC ACT EA 15 MIN 12/21/2021 Casie Hernandez, JAMARI GP 1    69609848640 HC PT THER PROC EA 15 MIN 12/21/2021 Casie Hernandez, JAMARI GP 3    62739499530 HC GAIT TRAINING EA 15 MIN 12/22/2021 Casie Hernandez, JAMARI GP 2    95719039820 HC PT NEUROMUSC RE EDUCATION EA 15 MIN 12/22/2021 Casie Hernandez, JAMARI GP 2    72158788590 HC PT THER PROC EA 15 MIN 12/22/2021 Casie Hernandez, JAMARI GP 3                   Casie. JAMARI Hernandez  12/22/2021

## 2021-12-22 NOTE — PROGRESS NOTES
Rehabilitation Nursing  Inpatient Rehabilitation Plan of Care Note    Plan of Care  Pain    Pain Management (Active)  Current Status (12/15/2021 2:25:00 PM): Potential for pain  Weekly Goal: No pain this week  Discharge Goal: No pain    Safety    Potential for Injury (Active)  Current Status (12/15/2021 2:25:00 PM): At risk for injury  Weekly Goal: No injury this week  Discharge Goal: No injuryC    Signed by: Phyllis Johnson Nurse

## 2021-12-22 NOTE — PROGRESS NOTES
[facility]  PROGRESS NOTE     Patient Identification:  Name:  Mellisa Sneed  Age:  76 y.o.  Sex:  female  :  1945  MRN:  [id number]  Visit Number:  [id number]  ROOM: [facility]     Primary Care Provider:  Rosario Cates MD    Length of stay in inpatient status:  7    Subjective     Chief Compliant:      Status post hip fracture with repair  Diarrhea  Leukocytosis      History of Presenting Illness: 76-year-old female with history of CAD with CABG, history of coronary stents, hyperlipidemia, hypertension, mild dementia, recent right hip fracture with right hemiarthroplasty.      Participated with physical and occupational therapy on 2021: Performs bed mobility with minimal assist and verbal/nonverbal cues; performs transfer activities with minimal assist and verbal/nonverbal cues; utilizes wheelchair for chair to bed transfer; utilize front wheeled walker for sit to stand/stand to sit transfer; ambulated 100 feet, 60 feet, 110 feet with front wheel walker contact-guard assist with verbal/nonverbal cues; participated with therapeutic exercise/ROM; fine manipulation/dexterity activity; gross motor coordination activities; required set up assist for grooming activity; max assist with verbal cues and raised toilet seat/grab bar and safety frame for toileting      Objective     Current Hospital Meds:amLODIPine, 5 mg, Oral, Q24H  atorvastatin, 20 mg, Oral, Nightly  cetirizine, 5 mg, Oral, Daily  clopidogrel, 75 mg, Oral, Daily  donepezil, 5 mg, Oral, Nightly  enoxaparin, 40 mg, Subcutaneous, Q24H  escitalopram, 10 mg, Oral, Daily  influenza vaccine, 0.5 mL, Intramuscular, Once  lisinopril, 20 mg, Oral, Q24H  metoprolol succinate XL, 25 mg, Oral, Q24H  metroNIDAZOLE, 500 mg, Oral, Q8H  multivitamin, 1 tablet, Oral, Daily    Pharmacy Consult,       ----------------------------------------------------------------------------------------------------------------------  Vital Signs:  Temp:  [98.3 °F  (36.8 °C)-98.8 °F (37.1 °C)] 98.3 °F (36.8 °C)  Heart Rate:  [80-88] 80  Resp:  [18-20] 20  BP: (134-136)/(74-76) 136/76  SpO2:  [97 %] 97 %  on   ;   Device (Oxygen Therapy): room air  Body mass index is 24.67 kg/m².    Wt Readings from Last 3 Encounters:   12/15/21 51.7 kg (114 lb)     Intake & Output (last 3 days)       12/19 0701 12/20 0700 12/20 0701 12/21 0700 12/21 0701 12/22 0700 12/22 0701 12/23 0700    P.O. 680 720 840 240    IV Piggyback   500     Total Intake(mL/kg) 680 (13.2) 720 (13.9) 1340 (25.9) 240 (4.6)    Net +680 +720 +1340 +240            Urine Unmeasured Occurrence 5 x 5 x 2 x 1 x    Stool Unmeasured Occurrence 3 x 2 x  1 x    Emesis Unmeasured Occurrence 1 x           Diet Regular  ----------------------------------------------------------------------------------------------------------------------  Physical exam:  Constitutional:   No acute distress, feels better this morning with resolved diarrhea  HEENT: Normocephalic atraumatic  Neck: Supple   Cardiovascular:    Pulmonary/Chest: Clear to auscultation  Abdominal:  .  Positive bowel sounds soft  Musculoskeletal: Right hip status post repair--incision site clean  Neurological: Slightly confused but no focal deficits  Skin: No rash    ----------------------------------------------------------------------------------------------------------------------    Last echocardiogram:    ----------------------------------------------------------------------------------------------------------------------  Results from last 7 days   Lab Units 12/22/21 0050 12/21/21  0701 12/21/21  0021 12/20/21  0023   CRP mg/dL  --  3.16*  --   --    LACTATE mmol/L  --  1.0  --   --    WBC 10*3/mm3 13.52*  --  15.04* 13.27*   HEMOGLOBIN g/dL 9.5*  --  10.7* 10.4*   HEMATOCRIT % 30.0*  --  33.7* 31.9*   MCV fL 96.5  --  96.8 95.5   MCHC g/dL 31.7  --  31.8 32.6   PLATELETS 10*3/mm3 454*  --  509* 455*         Results from last 7 days   Lab Units 12/22/21 0050  12/21/21  0021 12/20/21  1855 12/20/21  0023 12/20/21  0023   SODIUM mmol/L 134* 138  --   --  137   POTASSIUM mmol/L 4.1 4.6 4.9   < > 3.4*   CHLORIDE mmol/L 107 107  --   --  105   CO2 mmol/L 17.7* 18.1*  --   --  19.1*   BUN mg/dL 20 15  --   --  15   CREATININE mg/dL 1.13* 1.07*  --   --  0.93   EGFR IF NONAFRICN AM mL/min/1.73 47* 50*  --   --  58*   CALCIUM mg/dL 8.6 9.2  --   --  8.7   GLUCOSE mg/dL 112* 115*  --   --  132*    < > = values in this interval not displayed.   Estimated Creatinine Clearance: 33.5 mL/min (A) (by C-G formula based on SCr of 1.13 mg/dL (H)).  No results found for: AMMONIA              No results found for: HGBA1C, POCGLU  No results found for: TSH, FREET4  No results found for: PREGTESTUR, PREGSERUM, HCG, HCGQUANT  Pain Management Panel    There is no flowsheet data to display.       Brief Urine Lab Results  (Last result in the past 365 days)      Color   Clarity   Blood   Leuk Est   Nitrite   Protein   CREAT   Urine HCG        12/20/21 1613 Dark Yellow   Clear   Negative   Negative   Negative   30 mg/dL (1+)               No results found for: BLOODCX  Results from last 7 days   Lab Units 12/20/21  1613   NITRITE UA  Negative   WBC UA /HPF 3-5*   BACTERIA UA /HPF 1+*   SQUAM EPITHEL UA /HPF 3-6*     No results found for: URINECX  No results found for: WOUNDCX  No results found for: STOOLCX  Results from last 7 days   Lab Units 12/21/21  0701   LACTATE mmol/L 1.0   CRP mg/dL 3.16*       I have personally looked at the labs and they are summarized above.  ----------------------------------------------------------------------------------------------------------------------  Detailed radiology reports for the last 24 hours:    Imaging Results (Last 24 Hours)     Procedure Component Value Units Date/Time    XR Abdomen 2+ VW with Chest 1 VW [[id number]] Collected: 12/21/21 1136     Updated: 12/21/21 1145    Narrative:      XR ABDOMEN 2+ VIEWS W CHEST 1 VW-     CLINICAL INDICATION: Sepsis.   N/v.  Cough        COMPARISON: None available      FINDINGS:  Chest:  The included view of the chest demonstrates the lungs to be well  aerated. There is no focal alveolar infiltrate or pleural effusion. The  cardiac silhouette is normal. No acute osseous abnormality is seen  within the chest.     Abdomen:  Two views of the abdomen show no free air. I do not see abnormal bowel  distention to suggest complete obstruction. The bony structures are  intact. No abnormal soft tissue masses are seen. No suspicious appearing  calcifications are identified on today's exam.       Impression:      1. Nothing specific seen on today's exam to account for the patient's  symptoms.  2. The lungs are clear.  3. No evidence of bowel obstruction.  4. No free air.     This report was finalized on 12/21/2021 11:36 AM by Dr. Chemo Guy MD.           Final impressions for the last 30 days of radiology reports:    XR Abdomen 2+ VW with Chest 1 VW    Result Date: 12/21/2021  1. Nothing specific seen on today's exam to account for the patient's symptoms. 2. The lungs are clear. 3. No evidence of bowel obstruction. 4. No free air.  This report was finalized on 12/21/2021 11:36 AM by Dr. Chemo Guy MD.      I have personally looked at the radiology images and read the final radiology report.    Assessment & Plan      Status post hip fracture with right hip hemiarthroplasty--pParticipated with physical and occupational therapy on 12/21/2021: Performs bed mobility with minimal assist and verbal/nonverbal cues; performs transfer activities with minimal assist and verbal/nonverbal cues; utilizes wheelchair for chair to bed transfer; utilize front wheeled walker for sit to stand/stand to sit transfer; ambulated 100 feet, 60 feet, 110 feet with front wheel walker contact-guard assist with verbal/nonverbal cues; participated with therapeutic exercise/ROM; fine manipulation/dexterity activity; gross motor coordination activities; required set  up assist for grooming activity; max assist with verbal cues and raised toilet seat/grab bar and safety frame for toileting    Diarrhea--we will place empirically on p.o. Flagyl.  Stool studies pending.  Diarrhea much improved today    Leukocytosis--improving may be secondary to acute colitis.  Urinalysis was not impressive, chest x-ray negative.  Will monitor closely    CAD--stable    Hypertension-- better controlled today.    Dementia-- continue Aricept    VTE Prophylaxis:   Mechanical Order History:     None      Pharmalogical Order History:      Ordered     Dose Route Frequency Stop    12/15/21 1445  enoxaparin (LOVENOX) syringe 40 mg         40 mg SC Every 24 Hours 01/04/22 1759              Julissa Gautam MD  12/22/21  12:41 EST

## 2021-12-22 NOTE — THERAPY TREATMENT NOTE
Inpatient Rehabilitation - Occupational Therapy Progress Note and Treatment Note     [facility]     Patient Name: Mellisa Sneed  : 1945  MRN: [id number]    Today's Date: 2021                 Admit Date: 12/15/2021       No diagnosis found.    Patient Active Problem List   Diagnosis   • S/P right hip fracture       Past Medical History:   Diagnosis Date   • Arthritis    • CHF (congestive heart failure) (HCC)    • Hypertension        Past Surgical History:   Procedure Laterality Date   • CARDIAC SURGERY     • FRACTURE SURGERY               IRF OT ASSESSMENT FLOWSHEET (last 12 hours)     IRF OT Evaluation and Treatment     Row Name 21 140          OT Time and Intention    Document Type daily treatment; progress note  -TM     Mode of Treatment occupational therapy  -TM     Patient Effort adequate  -TM     Symptoms Noted During/After Treatment none  -TM     Row Name 21 140          General Information    General Observations of Patient Pt agreeable for therapy.  -TM     Existing Precautions/Restrictions fall; hip  -TM     Row Name 21 140          Cognition/Psychosocial    Orientation Status (Cognition) oriented to; person; situation; verbal cues/prompts needed for orientation; other (see comments)  confusion at times  -TM     Follows Commands (Cognition) follows one-step commands; verbal cues/prompting required; repetition of directions required  -TM     Row Name 21 140          Transfers    Chair-Bed [place] (Transfers) minimum assist (75% patient effort); verbal cues  -TM     [place] Level (Toilet Transfer) minimum assist (75% patient effort); verbal cues  -TM     Assistive Device (Toilet Transfer) raised toilet seat; grab bars/safety frame; wheelchair  -TM     Row Name 21 140          Chair-Bed Transfer    Assistive Device (Chair-Bed Transfers) wheelchair  -TM     Row Name 21 140          Toilet Transfer    Type (Toilet Transfer) stand pivot/stand step   -TM     Row Name 12/22/21 1402          Motor Skills    Motor Control/Coordination Interventions therapeutic exercise/ROM; gross motor coordination activities; fine motor manipulation/dexterity activities; other (see comments)  BUE ther ex/act, GMC/FMC  -TM     Row Name 12/22/21 1402          Bathing    Position (Bathing) supported sitting  -TM     Comment (Bathing) modA  -TM     Row Name 12/22/21 1402          Upper Body Dressing    [place] Level (Upper Body Dressing) set up assistance  -TM     Position (Upper Body Dressing) supported sitting  -TM     Row Name 12/22/21 1402          Lower Body Dressing    [place] (Lower Body Dressing) moderate assist (50% patient effort); verbal cues  -TM     Position (Lower Body Dressing) supported sitting  -TM     Row Name 12/22/21 1402          Grooming    [place] (Grooming) set up  -TM     Position (Grooming) supported sitting  -TM     Row Name 12/22/21 1402          Toileting    [place] (Toileting) maximum assist (25% patient effort); moderate assist (50% patient effort); verbal cues  -TM     Assistive Device Use (Toileting) raised toilet seat; grab bar/safety frame  -TM     Position (Toileting) supported sitting  -TM     Row Name 12/22/21 1402          UB Dressing Goal 1 (OT-IRF)    Progress/Outcomes (UB Dressing Goal 1, OT-IRF) goal met  -TM     Row Name 12/22/21 1402          LB Dressing Goal 1 (OT-IRF)    Progress/Outcomes (LB Dressing Goal 1, OT-IRF) goal met  -TM     Row Name 12/22/21 1402          LB Dressing Goal 2 (OT-IRF)    Progress/Outcomes (LB Dressing Goal 2, OT-IRF) goal ongoing  -TM     Row Name 12/22/21 1402          Toileting Goal 1 (OT-IRF)    Progress/Outcomes (Toileting Goal 1, OT-IRF) goal met  -TM     Row Name 12/22/21 1402          Toileting Goal 2 (OT-IRF)    Progress/Outcomes (Toileting Goal 2, OT-IRF) goal ongoing  -TM           User Key  (r) = Recorded By, (t) = Taken By, (c) = Cosigned By    Initials Name  Effective Dates    TM Sherry Varela, OT 06/16/21 -                  Occupational Therapy Education                 Title: PT OT SLP Therapies (Done)     Topic: Occupational Therapy (Done)     Point: ADL training (Done)     Description:   Instruct learner(s) on proper safety adaptation and remediation techniques during self care or transfers.   Instruct in proper use of assistive devices.              Learning Progress Summary           Patient Acceptance, E,D, VU,NR by TM at 12/22/2021 1358    Acceptance, E,D, VU,NR by TM at 12/21/2021 1504    Acceptance, E,D, VU,NR by TM at 12/20/2021 0947    Acceptance, D,E, NR by AS at 12/18/2021 0923    Acceptance, E,D, VU,NR by  at 12/17/2021 1313    Acceptance, E,D, VU,NR by  at 12/16/2021 1522    Acceptance, E,D, VU,NR by  at 12/16/2021 1126    Acceptance, E,D, VU,NR by  at 12/16/2021 1126                   Point: Precautions (Done)     Description:   Instruct learner(s) on prescribed precautions during self-care and functional transfers.              Learning Progress Summary           Patient Acceptance, E,D, VU,NR by TM at 12/22/2021 1358    Acceptance, E,D, VU,NR by TM at 12/21/2021 1504    Acceptance, E,D, VU,NR by TM at 12/20/2021 0947    Acceptance, D,E, NR by AS at 12/18/2021 0923    Acceptance, E,D, VU,NR by  at 12/17/2021 1313    Acceptance, E,D, VU,NR by  at 12/16/2021 1522    Acceptance, E,D, VU,NR by  at 12/16/2021 1126    Acceptance, E,D, VU,NR by  at 12/16/2021 1126                               User Key     Initials Effective Dates Name Provider Type Discipline     06/16/21 -  Khalida Glover, OT Occupational Therapist OT    TM 06/16/21 -  Sherry Varela, OT Occupational Therapist OT    AS 06/16/21 -  Tabitha Juarez, OT Occupational Therapist OT                    OT Recommendation and Plan    Planned Therapy Interventions (OT): activity tolerance training, adaptive equipment training, BADL retraining, IADL retraining,  ROM/therapeutic exercise, strengthening exercise, transfer/mobility retraining, occupation/activity based interventions, patient/caregiver education/training                    Time Calculation:      Time Calculation- OT     Row Name 12/22/21 1358             Time Calculation- OT    OT Start Time 1240  -TM      OT Stop Time 1435  -TM      OT Time Calculation (min) 115 min  -TM      Total Timed Code Minutes-  minute(s)  -TM      OT Non-Billable Time (min) 15 min  -TM            User Key  (r) = Recorded By, (t) = Taken By, (c) = Cosigned By    Initials Name Provider Type    TM Sherry Varela OT Occupational Therapist              Therapy Charges for Today     Code Description Service Date Service Provider Modifiers Qty    01186782001 HC OT SELF CARE/MGMT/TRAIN EA 15 MIN 12/21/2021 Sherry Varela, OT GO 2    77873336786 HC OT THERAPEUTIC ACT EA 15 MIN 12/21/2021 Sherry Varela, OT GO 3    52649422935 HC OT THER PROC EA 15 MIN 12/21/2021 Sherry Varela, OT GO 2    57350995145 HC OT THERAPEUTIC ACT EA 15 MIN 12/22/2021 Sherry Varela, OT GO 3    30644557552 HC OT THER PROC EA 15 MIN 12/22/2021 Sherry Varela, OT GO 3    04105422405 HC OT SELF CARE/MGMT/TRAIN EA 15 MIN 12/22/2021 Sherry Varela, OT GO 2                   Sherry Varela OT  12/22/2021

## 2021-12-22 NOTE — PROGRESS NOTES
Case Management  Inpatient Rehabilitation Team Conference    Conference Date/Time: 12/21/2021 11:53:45 AM    Team Conference Attendees:  MD Dina Harrison SW Jessica Bill, RN,   DAVID Sanchez, PT  Ella Anand OT    Demographics            Age: 76Y            Gender: Female    Admission Date: 12/15/2021 2:25:00 PM  Rehabilitation Diagnosis:  right hip hemiarthroplasty  Comorbidities: E78.5 Hyperlipidemia, unspecified  F03.90 Unspecified dementia without behavioral disturbance  I10 Essential (primary) hypertension  I25.10 Atherosclerotic heart disease of native coronary artery without angina  pectoris  Z79.02 Long term (current) use of antithrombotics/antiplatelets  Z79.899 Other long term (current) drug therapy  Z87.891 Personal history of nicotine dependence  Z95.1 Presence of aortocoronary bypass graft  Z95.5 Presence of coronary angioplasty implant and graft      Plan of Care  Anticipated Discharge Date/Estimated Length of Stay: 14 days  Anticipated Discharge Destination: Community discharge with assistance  Discharge Plan : Pt plans to return home with ex-daughter-in-law staying with  her.  Medical Necessity Expected Level Rationale: good  Intensity and Duration: an average of 3 hours/5 days per week  Medical Supervision and 24 Hour Rehab Nursing: x  Physical Therapy: x  PT Intensity/Duration: PT 1.5 hours per day/5 days per week  Occupational Therapy: x  OT Intensity/Duration: OT 1.5 hours per day/5 days per week  Social Work: x  Therapeutic Recreation: x  Updated (if changes indicated)    Anticipated Discharge Date/Estimated Length of Stay:   12-29-21      Discharge Plan of Care:    Based on the patient's medical and functional status, their prognosis and  expected level of functional improvement is: fair-good      Interdisciplinary Problem/Goals/Status  Mobility    [PT] Bed/Chair/Wheelchair(Active)  Current Status(12/16/2021): mod A  Weekly Goal(12/23/2021):  Sup  Discharge Goal: Sup    [PT] Walk(Active)  Current Status(12/16/2021): amb 70' RW CGA  Weekly Goal(12/23/2021): amb 300' RW Sup  Discharge Goal: amb 300' RW Sup        Pain    [RN] Pain Management(Active)  Current Status(12/15/2021): Potential for pain  Weekly Goal(12/31/2021): No pain this week  Discharge Goal: No pain        Safety    [RN] Potential for Injury(Active)  Current Status(12/15/2021): At risk for injury  Weekly Goal(12/31/2021): No injury this week  Discharge Goal: No injury        Self Care    [OT] Dressing (Lower)(Active)  Current Status(12/20/2021): maxA  Weekly Goal(12/28/2021): maxA/modA  Discharge Goal: modA    Comments: Pt plans to return home with ex-daughter-in-law providing assistance.    Signed by: TROY Carvajal    Physician CoSigned By: Dewey Campos 12/21/2021 19:50:43

## 2021-12-23 PROCEDURE — 25010000002 ENOXAPARIN PER 10 MG: Performed by: FAMILY MEDICINE

## 2021-12-23 PROCEDURE — 97116 GAIT TRAINING THERAPY: CPT

## 2021-12-23 PROCEDURE — 97110 THERAPEUTIC EXERCISES: CPT

## 2021-12-23 PROCEDURE — 97530 THERAPEUTIC ACTIVITIES: CPT

## 2021-12-23 PROCEDURE — 97535 SELF CARE MNGMENT TRAINING: CPT

## 2021-12-23 PROCEDURE — 97112 NEUROMUSCULAR REEDUCATION: CPT

## 2021-12-23 RX ADMIN — ENOXAPARIN SODIUM 40 MG: 40 INJECTION SUBCUTANEOUS at 17:09

## 2021-12-23 RX ADMIN — AMLODIPINE BESYLATE 5 MG: 5 TABLET ORAL at 08:38

## 2021-12-23 RX ADMIN — HYDROCODONE BITARTRATE AND ACETAMINOPHEN 1 TABLET: 7.5; 325 TABLET ORAL at 20:17

## 2021-12-23 RX ADMIN — METRONIDAZOLE 500 MG: 250 TABLET ORAL at 14:17

## 2021-12-23 RX ADMIN — METOPROLOL SUCCINATE 25 MG: 25 TABLET, EXTENDED RELEASE ORAL at 08:38

## 2021-12-23 RX ADMIN — LISINOPRIL 20 MG: 10 TABLET ORAL at 08:38

## 2021-12-23 RX ADMIN — DONEPEZIL HYDROCHLORIDE 5 MG: 5 TABLET, FILM COATED ORAL at 20:16

## 2021-12-23 RX ADMIN — METRONIDAZOLE 500 MG: 250 TABLET ORAL at 21:12

## 2021-12-23 RX ADMIN — METRONIDAZOLE 500 MG: 250 TABLET ORAL at 05:03

## 2021-12-23 RX ADMIN — Medication 1 TABLET: at 08:38

## 2021-12-23 RX ADMIN — ATORVASTATIN CALCIUM 20 MG: 20 TABLET, FILM COATED ORAL at 20:16

## 2021-12-23 RX ADMIN — CETIRIZINE HYDROCHLORIDE 5 MG: 10 TABLET, FILM COATED ORAL at 08:38

## 2021-12-23 RX ADMIN — ESCITALOPRAM 10 MG: 10 TABLET, FILM COATED ORAL at 08:38

## 2021-12-23 RX ADMIN — CLOPIDOGREL 75 MG: 75 TABLET, FILM COATED ORAL at 08:38

## 2021-12-23 NOTE — THERAPY PROGRESS REPORT/RE-CERT
Inpatient Rehabilitation - Physical Therapy Progress Note        [facility]     Patient Name: Mellisa Sneed  : 1945  MRN: [id number]    Today's Date: 2021                    Admit Date: 12/15/2021      Visit Dx:   No diagnosis found.    Patient Active Problem List   Diagnosis   • S/P right hip fracture       Past Medical History:   Diagnosis Date   • Arthritis    • CHF (congestive heart failure) (HCC)    • Hypertension        Past Surgical History:   Procedure Laterality Date   • CARDIAC SURGERY     • FRACTURE SURGERY         PT ASSESSMENT (last 12 hours)     IRF PT Evaluation and Treatment     Row Name 21 1112          PT Time and Intention    Document Type progress note  -LL     Mode of Treatment individual therapy; physical therapy  -LL     Patient/Family/Caregiver Comments/Observations Patient making steady progress with therapy ambulating further distance and requiring less assistance with functional mobility.  Patient continues to self-limit WB on RLE d/t reports of discomfort.  -     Row Name 21 1112          General Information    Existing Precautions/Restrictions fall; right; hip  history of dementia  -     Row Name 21 1112          Cognition/Psychosocial    Affect/Mental Status (Cognitive) --  alert and cooperative, confusion noted  -     Orientation Status (Cognition) oriented to; person; situation; verbal cues/prompts needed for orientation; other (see comments)  -LL     Follows Commands (Cognition) verbal cues/prompting required; physical/tactile prompts required  -LL     Personal Safety Interventions gait belt; nonskid shoes/slippers when out of bed; supervised activity  -LL     Row Name 21 1112          Pain Scale: FACES Pre/Post-Treatment    Pain: FACES Scale, Pretreatment 4-->hurts little more  -LL     Posttreatment Pain Rating 4-->hurts little more  -LL     Row Name 21 1112          Mobility    Left Lower Extremity (Weight-bearing Status)  weight-bearing as tolerated (WBAT)  -LL     Right Lower Extremity (Weight-bearing Status) weight-bearing as tolerated (WBAT)  -LL     Row Name 12/23/21 1112          Bed Mobility    Supine-Sit [place] (Bed Mobility) minimum assist (75% patient effort); verbal cues; nonverbal cues (demo/gesture)  -LL     Assistive Device (Bed Mobility) bed rails  -LL     Row Name 12/23/21 1112          Transfers    Bed-Chair [place] (Transfers) minimum assist (75% patient effort); verbal cues  -LL     Chair-Bed [place] (Transfers) minimum assist (75% patient effort); verbal cues  -LL     Assistive Device (Bed-Chair Transfers) wheelchair; walker, front-wheeled  -LL     Sit-Stand [place] (Transfers) minimum assist (75% patient effort); verbal cues; nonverbal cues (demo/gesture); contact guard  -LL     Stand-Sit [place] (Transfers) minimum assist (75% patient effort); verbal cues; nonverbal cues (demo/gesture); contact guard  -LL     Row Name 12/23/21 [facility]          Chair-Bed Transfer    Assistive Device (Chair-Bed Transfers) wheelchair  -LL     Row Name 12/23/21 [facility]          Sit-Stand Transfer    Assistive Device (Sit-Stand Transfers) walker, front-wheeled  -LL     Row Name 12/23/21 [facility]          Stand-Sit Transfer    Assistive Device (Stand-Sit Transfers) walker, front-wheeled  -LL     Row Name 12/23/21 [facility]          Gait/Stairs (Locomotion)    [place] Level (Gait) contact guard; verbal cues; nonverbal cues (demo/gesture)  -LL     Assistive Device (Gait) walker, front-wheeled  -LL     Distance in Feet (Gait) 160' x 2  -LL     Pattern (Gait) step-to  -LL     Deviations/Abnormal Patterns (Gait) antalgic; luis decreased; gait speed decreased; stride length decreased  -LL     Bilateral Gait Deviations forward flexed posture; weight shift ability decreased  -LL     Comment (Gait/Stairs) Patient self limites WB on R LE d/t c/o discomfort.  Cues for increased WB if possible.  -LL     Row Name 12/23/21 [facility]           Safety Issues, Functional Mobility    Impairments Affecting Function (Mobility) balance; cognition; endurance/activity tolerance; pain; range of motion (ROM); strength  -LL     Row Name 12/23/21 1112          Balance    Comment, Balance Standing bean bag toss  -LL     Row Name 12/23/21 1112          Hip (Therapeutic Exercise)    Hip Strengthening (Therapeutic Exercise) bilateral; flexion; extension; aBduction; aDduction; marching while seated; marching while standing; sitting; standing; 1 lb free weight; resistance band; green  -LL     Row Name 12/23/21 1112          Knee (Therapeutic Exercise)    Knee Strengthening (Therapeutic Exercise) bilateral; flexion; extension; marching while seated; marching while standing; LAQ (long arc quad); hamstring curls; standing; sitting; 1 lb free weight; resistance band; green  -LL     Row Name 12/23/21 1112          Ankle (Therapeutic Exercise)    Ankle Strengthening (Therapeutic Exercise) bilateral; dorsiflexion; plantarflexion; sitting; standing  -LL     Row Name 12/23/21 1112          IRF PT Goals    Bed Mobility Goal Selection (PT-IRF) bed mobility, PT goal 1  -LL     Transfer Goal Selection (PT-IRF) transfers, PT goal 1  -LL     Gait (Walking Locomotion) Goal Selection (PT-IRF) gait, PT goal 1  -LL     Row Name 12/23/21 1112          Bed Mobility Goal 1 (PT-IRF)    Activity/Assistive Device (Bed Mobility Goal 1, PT-IRF) sit to supine/supine to sit  -LL     [place] Level (Bed Mobility Goal 1, PT-IRF) independent  -LL     Time Frame (Bed Mobility Goal 1, PT-IRF) by discharge  -LL     Progress/Outcomes (Bed Mobility Goal 1, PT-IRF) goal ongoing  -LL     Row Name 12/23/21 1112          Transfer Goal 1 (PT-IRF)    Activity/Assistive Device (Transfer Goal 1, PT-IRF) sit-to-stand/stand-to-sit; bed-to-chair/chair-to-bed  -LL     [place] Level (Transfer Goal 1, PT-IRF) supervision required  -LL     Time Frame (Transfer Goal 1, PT-IRF) by discharge  -LL      Progress/Outcomes (Transfer Goal 1, PT-IRF) goal ongoing  -LL     Row Name 12/23/21 1112          Gait/Walking Locomotion Goal 1 (PT-IRF)    Activity/Assistive Device (Gait/Walking Locomotion Goal 1, PT-IRF) walker, rolling  -LL     Gait/Walking Locomotion Distance Goal 1 (PT-IRF) 300'  -LL     [place] Level (Gait/Walking Locomotion Goal 1, PT-IRF) supervision required  -LL     Time Frame (Gait/Walking Locomotion Goal 1, PT-IRF) by discharge  -LL     Row Name 12/23/21 1112          Positioning and Restraints    Pre-Treatment Position --  WC  -LL     Post Treatment Position wheelchair  -LL     In Wheelchair sitting; call light within reach; encouraged to call for assist; legs elevated  -LL     Row Name 12/23/21 1112          Therapy Assessment/Plan (PT)    Patient's Goals For Discharge return home  -     Row Name 12/23/21 1112          Therapy Assessment/Plan (PT)    Rehab Potential/Prognosis (PT) adequate, monitor progress closely  -LL     Frequency of Treatment (PT) 5 times per week  -LL     Estimated Duration of Therapy (PT) 2 weeks  -LL     Problem List (PT) balance; cognition; mobility; range of motion (ROM); strength; pain  -LL     Activity Limitations Related to Problem List (PT) unable to ambulate safely; unable to transfer safely  -     Row Name 12/23/21 1112          Therapy Plan Review/Discharge Plan (PT)    Anticipated Equipment Needs at Discharge (PT Eval) --  tbd  -LL     Expected Discharge Disposition (PT Eval) home with home health care; home with caregiver  -           User Key  (r) = Recorded By, (t) = Taken By, (c) = Cosigned By    Initials Name Provider Type    LL Casie Hernandez PTA Physical Therapy Assistant              Wound 12/15/21 1530 Right anterior greater trochanter (Active)   Dressing Appearance intact 12/23/21 0715   Closure [place]; Approximated 12/23/21 0134   Base clean; pink; red 12/23/21 0134   Periwound ecchymotic; edematous 12/23/21 0134   Drainage  Characteristics/Odor yellow; green 12/23/21 0134   Drainage Amount scant 12/23/21 0134   Care, Wound cleansed with 12/23/21 0134   Dressing Care dressing changed 12/23/21 0134     Physical Therapy Education                 Title: PT OT SLP Therapies (Done)     Topic: Physical Therapy (Done)     Point: Mobility training (Done)     Learning Progress Summary           Patient Acceptance, E,D, VU,NR by  at 12/23/2021 1124      Show all documentation for this point (6)                 Point: Home exercise program (Done)     Learning Progress Summary           Patient Acceptance, E,D, VU,NR by LL at 12/23/2021 1124      Show all documentation for this point (6)                 Point: Body mechanics (Done)     Learning Progress Summary           Patient Acceptance, E,D, VU,NR by  at 12/23/2021 1124      Show all documentation for this point (6)                 Point: Precautions (Done)     Learning Progress Summary           Patient Acceptance, E,D, VU,NR by  at 12/23/2021 1124      Show all documentation for this point (6)                             User Key     Initials Effective Dates Name Provider Type Discipline     05/02/16 -  Casie Hernandez PTA Physical Therapy Assistant PT                PT Recommendation and Plan    Frequency of Treatment (PT): 5 times per week  Anticipated Equipment Needs at Discharge (PT Eval):  (tbd)                  Time Calculation:      PT Charges     Row Name 12/23/21 1125             Time Calculation    Start Time 0915  -LL      Stop Time 1045  -LL      Time Calculation (min) 90 min  -LL      PT Received On 12/23/21  -LL      PT Goal Re-Cert Due Date 12/30/21  -              Time Calculation- PT    Total Timed Code Minutes- PT 90 minute(s)  -LL            User Key  (r) = Recorded By, (t) = Taken By, (c) = Cosigned By    Initials Name Provider Type     Casie Hernandez PTA Physical Therapy Assistant                Therapy Charges for Today     Code Description Service Date  Service Provider Modifiers Qty    93062236690 HC GAIT TRAINING EA 15 MIN 12/22/2021 Casie Hernandez, PTA GP 2    78897573442 HC PT NEUROMUSC RE EDUCATION EA 15 MIN 12/22/2021 Casie Hernandez, PTA GP 2    18427120263 HC PT THER PROC EA 15 MIN 12/22/2021 Casie Hernandez, PTA GP 3    65407112736 HC GAIT TRAINING EA 15 MIN 12/23/2021 Casie Hernandez, PTA GP 2    65360806345 HC PT NEUROMUSC RE EDUCATION EA 15 MIN 12/23/2021 Casie Hernandez, PTA GP 1    58877229188 HC PT THER PROC EA 15 MIN 12/23/2021 Casie Hernandez, PTA GP 3                   Casie. JAMARI Hernandez  12/23/2021

## 2021-12-23 NOTE — PROGRESS NOTES
[facility]  PROGRESS NOTE     Patient Identification:  Name:  Mellisa Sneed  Age:  76 y.o.  Sex:  female  :  1945  MRN:  [id number]  Visit Number:  [id number]  ROOM: [facility]     Primary Care Provider:  Rosario Cates MD    Length of stay in inpatient status:  8    Subjective     Chief Compliant:      Status post hip fracture with repair  Diarrhea  Leukocytosis      History of Presenting Illness: 76-year-old female with history of CAD with CABG, history of coronary stents, hyperlipidemia, hypertension, mild dementia, recent right hip fracture with right hemiarthroplasty.      Participated with physical and occupational therapy on 2021: Performs bed mobility with minimal assist and verbal/nonverbal cues; performs transfer activities with minimal assist and verbal/nonverbal cues; utilize front wheeled walker for sit to stand/stand to sit transfer; ambulated 160 feet and 100 feet with front wheeled walker and contact-guard assist with verbal/nonverbal cues; utilizes wheelchair for chair to bed transfer; participated with therapeutic exercise/ROM; gross motor coronation activities; fine motor manipulation/dexterity activity; required moderate assist for bathing; set up assist for upper body dressing; moderate assist with verbal cues for lower body dressing; set up assist for grooming and max assist with verbal cues and raised toilet seat/grab bar safety frame for toileting      Objective     Current Hospital Meds:amLODIPine, 5 mg, Oral, Q24H  atorvastatin, 20 mg, Oral, Nightly  cetirizine, 5 mg, Oral, Daily  clopidogrel, 75 mg, Oral, Daily  donepezil, 5 mg, Oral, Nightly  enoxaparin, 40 mg, Subcutaneous, Q24H  escitalopram, 10 mg, Oral, Daily  influenza vaccine, 0.5 mL, Intramuscular, Once  lisinopril, 20 mg, Oral, Q24H  metoprolol succinate XL, 25 mg, Oral, Q24H  metroNIDAZOLE, 500 mg, Oral, Q8H  multivitamin, 1 tablet, Oral, Daily    Pharmacy Consult,        ----------------------------------------------------------------------------------------------------------------------  Vital Signs:  Temp:  [98.1 °F (36.7 °C)-99.1 °F (37.3 °C)] 98.1 °F (36.7 °C)  Heart Rate:  [84-90] 90  Resp:  [20] 20  BP: (154-160)/(72-82) 160/82  SpO2:  [96 %] 96 %  on   ;   Device (Oxygen Therapy): room air  Body mass index is 24.67 kg/m².    Wt Readings from Last 3 Encounters:   12/15/21 51.7 kg (114 lb)     Intake & Output (last 3 days)       12/20 0701  12/21 0700 12/21 0701 12/22 0700 12/22 0701 12/23 0700 12/23 0701  12/24 0700    P.O. 720 840 960 240    IV Piggyback  500      Total Intake(mL/kg) 720 (13.9) 1340 (25.9) 960 (18.6) 240 (4.6)    Net +720 +1340 +960 +240            Urine Unmeasured Occurrence 5 x 2 x 5 x 1 x    Stool Unmeasured Occurrence 2 x  1 x 1 x        Diet Regular  ----------------------------------------------------------------------------------------------------------------------  Physical exam:  Constitutional:   No acute distress, feels better this morning with resolved diarrhea, denies any abdominal pain working with therapy without any difficulty  HEENT: Normocephalic atraumatic  Neck: Supple   Cardiovascular:    Pulmonary/Chest: Clear to auscultation  Abdominal:  .  Positive bowel sounds soft  Musculoskeletal: Right hip status post repair--incision site clean  Neurological: Slightly confused but no focal deficits  Skin: No rash    ----------------------------------------------------------------------------------------------------------------------    Last echocardiogram:    ----------------------------------------------------------------------------------------------------------------------  Results from last 7 days   Lab Units 12/22/21  0050 12/21/21  0701 12/21/21  0021 12/20/21  0023   CRP mg/dL  --  3.16*  --   --    LACTATE mmol/L  --  1.0  --   --    WBC 10*3/mm3 13.52*  --  15.04* 13.27*   HEMOGLOBIN g/dL 9.5*  --  10.7* 10.4*   HEMATOCRIT % 30.0*   --  33.7* 31.9*   MCV fL 96.5  --  96.8 95.5   MCHC g/dL 31.7  --  31.8 32.6   PLATELETS 10*3/mm3 454*  --  509* 455*         Results from last 7 days   Lab Units 12/22/21  0050 12/21/21  0021 12/20/21  1855 12/20/21  0023 12/20/21  0023   SODIUM mmol/L 134* 138  --   --  137   POTASSIUM mmol/L 4.1 4.6 4.9   < > 3.4*   CHLORIDE mmol/L 107 107  --   --  105   CO2 mmol/L 17.7* 18.1*  --   --  19.1*   BUN mg/dL 20 15  --   --  15   CREATININE mg/dL 1.13* 1.07*  --   --  0.93   EGFR IF NONAFRICN AM mL/min/1.73 47* 50*  --   --  58*   CALCIUM mg/dL 8.6 9.2  --   --  8.7   GLUCOSE mg/dL 112* 115*  --   --  132*    < > = values in this interval not displayed.   Estimated Creatinine Clearance: 33.5 mL/min (A) (by C-G formula based on SCr of 1.13 mg/dL (H)).  No results found for: AMMONIA              No results found for: HGBA1C, POCGLU  No results found for: TSH, FREET4  No results found for: PREGTESTUR, PREGSERUM, HCG, HCGQUANT  Pain Management Panel    There is no flowsheet data to display.       Brief Urine Lab Results  (Last result in the past 365 days)      Color   Clarity   Blood   Leuk Est   Nitrite   Protein   CREAT   Urine HCG        12/20/21 1613 Dark Yellow   Clear   Negative   Negative   Negative   30 mg/dL (1+)               No results found for: BLOODCX  Results from last 7 days   Lab Units 12/20/21  1613   NITRITE UA  Negative   WBC UA /HPF 3-5*   BACTERIA UA /HPF 1+*   SQUAM EPITHEL UA /HPF 3-6*     No results found for: URINECX  No results found for: WOUNDCX  No results found for: STOOLCX  Results from last 7 days   Lab Units 12/21/21  0701   LACTATE mmol/L 1.0   CRP mg/dL 3.16*       I have personally looked at the labs and they are summarized above.  ----------------------------------------------------------------------------------------------------------------------  Detailed radiology reports for the last 24 hours:    Imaging Results (Last 24 Hours)     ** No results found for the last 24 hours. **         Final impressions for the last 30 days of radiology reports:    XR Abdomen 2+ VW with Chest 1 VW    Result Date: 12/21/2021  1. Nothing specific seen on today's exam to account for the patient's symptoms. 2. The lungs are clear. 3. No evidence of bowel obstruction. 4. No free air.  This report was finalized on 12/21/2021 11:36 AM by Dr. Chemo Guy MD.      I have personally looked at the radiology images and read the final radiology report.    Assessment & Plan      Status post hip fracture with right hip hemiarthroplasty--Participated with physical and occupational therapy on 12/22/2021: Performs bed mobility with minimal assist and verbal/nonverbal cues; performs transfer activities with minimal assist and verbal/nonverbal cues; utilize front wheeled walker for sit to stand/stand to sit transfer; ambulated 160 feet and 100 feet with front wheeled walker and contact-guard assist with verbal/nonverbal cues; utilizes wheelchair for chair to bed transfer; participated with therapeutic exercise/ROM; gross motor coronation activities; fine motor manipulation/dexterity activity; required moderate assist for bathing; set up assist for upper body dressing; moderate assist with verbal cues for lower body dressing; set up assist for grooming and max assist with verbal cues and raised toilet seat/grab bar safety frame for toileting    Diarrhea--we will place empirically on p.o. Flagyl.  Stool studies pending.  Diarrhea resolved    Leukocytosis--improving may be secondary to acute colitis.  Urinalysis was not impressive, chest x-ray negative.  Will monitor closely    CAD--stable    Hypertension-- better controlled today.    Dementia-- continue Aricept    VTE Prophylaxis:   Mechanical Order History:     None      Pharmalogical Order History:      Ordered     Dose Route Frequency Stop    12/15/21 1445  enoxaparin (LOVENOX) syringe 40 mg         40 mg SC Every 24 Hours 01/04/22 [id number]              Julissa Gautam  MD  12/23/21  12:09 EST

## 2021-12-23 NOTE — PROGRESS NOTES
Rehabilitation Nursing  Inpatient Rehabilitation Plan of Care Note    Plan of Care  Copy from [place]    Pain Management (Active)  Current Status (12/15/2021 2:25:00 PM): Potential for pain  Weekly Goal: No pain this week  Discharge Goal: No pain    Safety    Potential for Injury (Active)  Current Status (12/15/2021 2:25:00 PM): At risk for injury  Weekly Goal: No injury this week  Discharge Goal: No injury    Signed by: Tiana Kahn, Nurse

## 2021-12-23 NOTE — THERAPY TREATMENT NOTE
Inpatient Rehabilitation - Occupational Therapy Treatment Note    [facility] [place]     Patient Name: Mellisa Sneed  : 1945  MRN: [id number]    Today's Date: 2021                 Admit Date: 12/15/2021       No diagnosis found.    Patient Active Problem List   Diagnosis   • S/P right hip fracture       Past Medical History:   Diagnosis Date   • Arthritis    • CHF (congestive heart failure) (HCC)    • Hypertension        Past Surgical History:   Procedure Laterality Date   • CARDIAC SURGERY     • FRACTURE SURGERY               IRF OT ASSESSMENT FLOWSHEET (last 12 hours)     IRF OT Evaluation and Treatment     Row Name 21 1457          OT Time and Intention    Document Type daily treatment  -TM     Mode of Treatment occupational therapy  -TM     Patient Effort adequate  -TM     Symptoms Noted During/After Treatment none  -TM     Row Name 21 1457          General Information    General Observations of Patient Pt agreeable for therapy.  -TM     Existing Precautions/Restrictions fall; hip  -TM     Row Name 21 1457          Cognition/Psychosocial    Orientation Status (Cognition) oriented to; person; situation; verbal cues/prompts needed for orientation; other (see comments)  confusion at times  -TM     Follows Commands (Cognition) follows one-step commands; verbal cues/prompting required; repetition of directions required  -TM     Row Name 21 1457          Transfers    Chair-Bed [place] (Transfers) minimum assist (75% patient effort); verbal cues  -TM     [place] Level (Toilet Transfer) minimum assist (75% patient effort); verbal cues  -TM     Assistive Device (Toilet Transfer) raised toilet seat; grab bars/safety frame; wheelchair  -TM     Row Name 21 1457          Chair-Bed Transfer    Assistive Device (Chair-Bed Transfers) wheelchair  -TM     Row Name 21 1457          Toilet Transfer    Type (Toilet Transfer) stand pivot/stand step  -TM     Row Name 21 1457           Motor Skills    Motor Control/Coordination Interventions therapeutic exercise/ROM; gross motor coordination activities; fine motor manipulation/dexterity activities; other (see comments)  BUE ther ex/act, GMC/FMC  -TM     Row Name 12/23/21 1457          Grooming    [place] Level (Grooming) set up  -TM     Position (Grooming) supported sitting  -TM     Row Name 12/23/21 1457          Toileting    [place] Level (Toileting) moderate assist (50% patient effort); maximum assist (25% patient effort); verbal cues  -TM     Assistive Device Use (Toileting) raised toilet seat; grab bar/safety frame  -TM     Position (Toileting) supported sitting  -TM           User Key  (r) = Recorded By, (t) = Taken By, (c) = Cosigned By    Initials Name Effective Dates     Sherry Varela OT 06/16/21 -                  Occupational Therapy Education                 Title: PT OT SLP Therapies (Done)     Topic: Occupational Therapy (Done)     Point: ADL training (Done)     Description:   Instruct learner(s) on proper safety adaptation and remediation techniques during self care or transfers.   Instruct in proper use of assistive devices.              Learning Progress Summary           Patient Acceptance, D,E, VU,NR by  at 12/23/2021 1456      Show all documentation for this point (8)                 Point: Precautions (Done)     Description:   Instruct learner(s) on prescribed precautions during self-care and functional transfers.              Learning Progress Summary           Patient Acceptance, D,E, VU,NR by  at 12/23/2021 1456      Show all documentation for this point (8)                             User Key     Initials Effective Dates Name Provider Type Discipline     06/16/21 -  Sherry Varela OT Occupational Therapist OT                    OT Recommendation and Plan    Planned Therapy Interventions (OT): activity tolerance training, adaptive equipment training, BADL retraining, IADL  retraining, ROM/therapeutic exercise, strengthening exercise, transfer/mobility retraining, occupation/activity based interventions, patient/caregiver education/training                    Time Calculation:      Time Calculation- OT     Row Name 12/23/21 1456             Time Calculation- OT    OT Start Time 1245  -TM      OT Stop Time 1425  -TM      OT Time Calculation (min) 100 min  -TM      Total Timed Code Minutes-  minute(s)  -TM      OT Non-Billable Time (min) 15 min  -TM            User Key  (r) = Recorded By, (t) = Taken By, (c) = Cosigned By    Initials Name Provider Type    TM Sherry Varela OT Occupational Therapist              Therapy Charges for Today     Code Description Service Date Service Provider Modifiers Qty    08264559490 HC OT THERAPEUTIC ACT EA 15 MIN 12/22/2021 Sherry Varela, OT GO 3    81760326715 HC OT THER PROC EA 15 MIN 12/22/2021 Sherry Varela, OT GO 3    72579624478 HC OT SELF CARE/MGMT/TRAIN EA 15 MIN 12/22/2021 Sherry Varela, OT GO 2    44655452976 HC OT SELF CARE/MGMT/TRAIN EA 15 MIN 12/23/2021 Sherry Varela, OT GO 2    24353394406 HC OT THERAPEUTIC ACT EA 15 MIN 12/23/2021 Sherry Varela, OT GO 3    10273994963 HC OT THER PROC EA 15 MIN 12/23/2021 Sherry Varela, OT GO 2                   Sherry Varela OT  12/23/2021

## 2021-12-23 NOTE — PLAN OF CARE
Goal Outcome Evaluation:  Plan of Care Reviewed With: patient        Progress: improving  Outcome Summary: PROGRESSING WITH CURRENT THERAPIES. CONTINUE POC

## 2021-12-24 LAB
ALBUMIN SERPL-MCNC: 3.15 G/DL (ref 3.5–5.2)
ALBUMIN/GLOB SERPL: 1.2 G/DL
ALP SERPL-CCNC: 79 U/L (ref 39–117)
ALT SERPL W P-5'-P-CCNC: 12 U/L (ref 1–33)
ANION GAP SERPL CALCULATED.3IONS-SCNC: 11.8 MMOL/L (ref 5–15)
AST SERPL-CCNC: 21 U/L (ref 1–32)
BASOPHILS # BLD AUTO: 0.14 10*3/MM3 (ref 0–0.2)
BASOPHILS NFR BLD AUTO: 1.3 % (ref 0–1.5)
BILIRUB SERPL-MCNC: 0.4 MG/DL (ref 0–1.2)
BUN SERPL-MCNC: 25 MG/DL (ref 8–23)
BUN/CREAT SERPL: 21.2 (ref 7–25)
CALCIUM SPEC-SCNC: 8.4 MG/DL (ref 8.6–10.5)
CHLORIDE SERPL-SCNC: 109 MMOL/L (ref 98–107)
CO2 SERPL-SCNC: 17.2 MMOL/L (ref 22–29)
CREAT SERPL-MCNC: 1.18 MG/DL (ref 0.57–1)
DEPRECATED RDW RBC AUTO: 54.7 FL (ref 37–54)
EOSINOPHIL # BLD AUTO: 0.33 10*3/MM3 (ref 0–0.4)
EOSINOPHIL NFR BLD AUTO: 3.2 % (ref 0.3–6.2)
ERYTHROCYTE [DISTWIDTH] IN BLOOD BY AUTOMATED COUNT: 15.5 % (ref 12.3–15.4)
GFR SERPL CREATININE-BSD FRML MDRD: 44 ML/MIN/1.73
GLOBULIN UR ELPH-MCNC: 2.7 GM/DL
GLUCOSE SERPL-MCNC: 107 MG/DL (ref 65–99)
HCT VFR BLD AUTO: 29.4 % (ref 34–46.6)
HGB BLD-MCNC: 9.5 G/DL (ref 12–15.9)
IMM GRANULOCYTES # BLD AUTO: 0.06 10*3/MM3 (ref 0–0.05)
IMM GRANULOCYTES NFR BLD AUTO: 0.6 % (ref 0–0.5)
LYMPHOCYTES # BLD AUTO: 2.73 10*3/MM3 (ref 0.7–3.1)
LYMPHOCYTES NFR BLD AUTO: 26.1 % (ref 19.6–45.3)
MCH RBC QN AUTO: 31 PG (ref 26.6–33)
MCHC RBC AUTO-ENTMCNC: 32.3 G/DL (ref 31.5–35.7)
MCV RBC AUTO: 96.1 FL (ref 79–97)
MONOCYTES # BLD AUTO: 0.83 10*3/MM3 (ref 0.1–0.9)
MONOCYTES NFR BLD AUTO: 7.9 % (ref 5–12)
NEUTROPHILS NFR BLD AUTO: 6.38 10*3/MM3 (ref 1.7–7)
NEUTROPHILS NFR BLD AUTO: 60.9 % (ref 42.7–76)
NRBC BLD AUTO-RTO: 0 /100 WBC (ref 0–0.2)
PLATELET # BLD AUTO: 420 10*3/MM3 (ref 140–450)
PMV BLD AUTO: 9.7 FL (ref 6–12)
POTASSIUM SERPL-SCNC: 3.7 MMOL/L (ref 3.5–5.2)
PROT SERPL-MCNC: 5.8 G/DL (ref 6–8.5)
RBC # BLD AUTO: 3.06 10*6/MM3 (ref 3.77–5.28)
SODIUM SERPL-SCNC: 138 MMOL/L (ref 136–145)
WBC NRBC COR # BLD: 10.47 10*3/MM3 (ref 3.4–10.8)

## 2021-12-24 PROCEDURE — 97530 THERAPEUTIC ACTIVITIES: CPT

## 2021-12-24 PROCEDURE — 97535 SELF CARE MNGMENT TRAINING: CPT

## 2021-12-24 PROCEDURE — 97110 THERAPEUTIC EXERCISES: CPT

## 2021-12-24 PROCEDURE — 25010000002 ENOXAPARIN PER 10 MG: Performed by: FAMILY MEDICINE

## 2021-12-24 PROCEDURE — 97116 GAIT TRAINING THERAPY: CPT

## 2021-12-24 PROCEDURE — 85025 COMPLETE CBC W/AUTO DIFF WBC: CPT | Performed by: INTERNAL MEDICINE

## 2021-12-24 PROCEDURE — 80053 COMPREHEN METABOLIC PANEL: CPT | Performed by: INTERNAL MEDICINE

## 2021-12-24 RX ORDER — METOPROLOL SUCCINATE 50 MG/1
50 TABLET, EXTENDED RELEASE ORAL
Status: DISCONTINUED | OUTPATIENT
Start: 2021-12-24 | End: 2021-12-29 | Stop reason: HOSPADM

## 2021-12-24 RX ADMIN — ATORVASTATIN CALCIUM 20 MG: 20 TABLET, FILM COATED ORAL at 20:24

## 2021-12-24 RX ADMIN — METOPROLOL SUCCINATE 50 MG: 50 TABLET, EXTENDED RELEASE ORAL at 09:43

## 2021-12-24 RX ADMIN — DONEPEZIL HYDROCHLORIDE 5 MG: 5 TABLET, FILM COATED ORAL at 20:24

## 2021-12-24 RX ADMIN — AMLODIPINE BESYLATE 5 MG: 5 TABLET ORAL at 09:42

## 2021-12-24 RX ADMIN — ESCITALOPRAM 10 MG: 10 TABLET, FILM COATED ORAL at 09:43

## 2021-12-24 RX ADMIN — LISINOPRIL 20 MG: 10 TABLET ORAL at 09:42

## 2021-12-24 RX ADMIN — METRONIDAZOLE 500 MG: 250 TABLET ORAL at 05:10

## 2021-12-24 RX ADMIN — CLOPIDOGREL 75 MG: 75 TABLET, FILM COATED ORAL at 09:42

## 2021-12-24 RX ADMIN — Medication 1 TABLET: at 09:42

## 2021-12-24 RX ADMIN — CETIRIZINE HYDROCHLORIDE 5 MG: 10 TABLET, FILM COATED ORAL at 09:43

## 2021-12-24 RX ADMIN — ENOXAPARIN SODIUM 40 MG: 40 INJECTION SUBCUTANEOUS at 17:03

## 2021-12-24 NOTE — PLAN OF CARE
Problem: Rehabilitation (IRF) Plan of Care  Goal: Plan of Care Review  Outcome: Ongoing, Progressing  Flowsheets  Taken 12/24/2021 0321 by Grace Oviedo, RN  Outcome Summary:   calm and cooperative   resting well throughout the night   continue with poc  Plan of Care Reviewed With: patient  Taken 12/22/2021 2231 by Naomy Crews RN  Progress: improving  Goal: Patient-Specific Goal (Individualized)  Outcome: Ongoing, Progressing  Goal: Absence of New-Onset Illness or Injury  Outcome: Ongoing, Progressing  Intervention: Prevent Fall and Fall Injury  Recent Flowsheet Documentation  Taken 12/24/2021 0200 by Grace Oviedo RN  Safety Promotion/Fall Prevention: safety round/check completed  Taken 12/24/2021 0000 by Grace Oviedo RN  Safety Promotion/Fall Prevention: safety round/check completed  Taken 12/23/2021 2200 by Grace Oviedo RN  Safety Promotion/Fall Prevention: safety round/check completed  Taken 12/23/2021 2000 by Grace Oviedo, RN  Safety Promotion/Fall Prevention: safety round/check completed  Intervention: Prevent VTE (Venous Thromboembolism)  Recent Flowsheet Documentation  Taken 12/23/2021 2017 by Grace Oviedo, RN  VTE Prevention/Management: bleeding risk factor(s) identified  Goal: Optimal Comfort and Wellbeing  Outcome: Ongoing, Progressing  Goal: Home and Community Transition Plan Established  Outcome: Ongoing, Progressing   Goal Outcome Evaluation:  Plan of Care Reviewed With: patient           Outcome Summary: calm and cooperative; resting well throughout the night; continue with poc

## 2021-12-24 NOTE — PROGRESS NOTES
Rehabilitation Nursing  Inpatient Rehabilitation Plan of Care Note    Plan of Care  Pain    Pain Management (Active)  Current Status (12/17/2021 2:12:00 PM): At risk for pain  Weekly Goal: No pain this week  Discharge Goal: No pain    Safety    Potential for Injury (Active)  Current Status (12/17/2021 2:12:00 PM): Potential for injury  Weekly Goal: No injury this week  Discharge Goal: No injuryC    Signed by: Phyllis Johnson Nurse

## 2021-12-24 NOTE — THERAPY TREATMENT NOTE
Inpatient Rehabilitation - Physical Therapy Treatment Note       MIAN Pizano     Patient Name: Mellisa Sneed  : 1945  MRN: [id number]    Today's Date: 2021                    Admit Date: 12/15/2021      Visit Dx:   No diagnosis found.    Patient Active Problem List   Diagnosis   • S/P right hip fracture       Past Medical History:   Diagnosis Date   • Arthritis    • CHF (congestive heart failure) (HCC)    • Hypertension        Past Surgical History:   Procedure Laterality Date   • CARDIAC SURGERY     • FRACTURE SURGERY         PT ASSESSMENT (last 12 hours)     IRF PT Evaluation and Treatment     Row Name 21 1405          PT Time and Intention    Document Type daily treatment  -LB     Mode of Treatment individual therapy; physical therapy  -LB     Row Name 21 1407          General Information    Existing Precautions/Restrictions fall; right; hip  history of dementia  -LB     Row Name 21 1405          Cognition/Psychosocial    Affect/Mental Status (Cognitive) WFL  but mild confusion  -LB     Follows Commands (Cognition) verbal cues/prompting required; physical/tactile prompts required  -LB     Personal Safety Interventions gait belt; nonskid shoes/slippers when out of bed; supervised activity  -LB     Row Name 21 1400          Pain Scale: FACES Pre/Post-Treatment    Pain: FACES Scale, Pretreatment 4-->hurts little more  -LB     Posttreatment Pain Rating 4-->hurts little more  -LB     Pain Location --  RLE  -LB     Pre/Posttreatment Pain Comment rest, repositioned  -LB     Row Name 21 1403          Mobility    Left Lower Extremity (Weight-bearing Status) weight-bearing as tolerated (WBAT)  -LB     Right Lower Extremity (Weight-bearing Status) weight-bearing as tolerated (WBAT)  -LB     Row Name 21 1403          Bed Mobility    Supine-Sit [place] (Bed Mobility) verbal cues; nonverbal cues (demo/gesture); standby assist  -LB     Sit-Supine [place] (Bed Mobility)  verbal cues; nonverbal cues (demo/gesture); minimum assist (75% patient effort)  -LB     Assistive Device (Bed Mobility) bed rails  -LB     Row Name 12/24/21 1405          Transfers    Bed-Chair [place] (Transfers) verbal cues; nonverbal cues (demo/gesture); contact guard  -LB     Chair-Bed [place] (Transfers) verbal cues; nonverbal cues (demo/gesture); contact guard  -LB     Assistive Device (Bed-Chair Transfers) wheelchair  -LB     Sit-Stand [place] (Transfers) verbal cues; nonverbal cues (demo/gesture); contact guard  -LB     Stand-Sit [place] (Transfers) verbal cues; nonverbal cues (demo/gesture); contact guard  -LB     [place] Level (Toilet Transfer) minimum assist (75% patient effort); verbal cues; nonverbal cues (demo/gesture)  -LB     Assistive Device (Toilet Transfer) grab bars/safety frame; raised toilet seat  -LB     Row Name 12/24/21 1405          Chair-Bed Transfer    Assistive Device (Chair-Bed Transfers) wheelchair  -LB     Row Name 12/24/21 1405          Sit-Stand Transfer    Assistive Device (Sit-Stand Transfers) walker, front-wheeled  -LB     Row Name 12/24/21 1405          Stand-Sit Transfer    Assistive Device (Stand-Sit Transfers) walker, front-wheeled  -LB     Row Name 12/24/21 1405          Toilet Transfer    Type (Toilet Transfer) stand pivot/stand step  -LB     Row Name 12/24/21 1405          Gait/Stairs (Locomotion)    [place] Level (Gait) verbal cues; nonverbal cues (demo/gesture); standby assist  -LB     Assistive Device (Gait) walker, front-wheeled  -LB     Distance in Feet (Gait) 160' x2  -LB     Deviations/Abnormal Patterns (Gait) antalgic; luis decreased; gait speed decreased; stride length decreased  -LB     Bilateral Gait Deviations forward flexed posture; weight shift ability decreased  -LB     Row Name 12/24/21 1405          Motor Skills    Therapeutic Exercise --  sitting ex, supine ex  -LB     Row Name 12/24/21 1405          IRF PT Goals    Bed  Mobility Goal Selection (PT-IRF) bed mobility, PT goal 1  -LB     Transfer Goal Selection (PT-IRF) transfers, PT goal 1  -LB     Gait (Walking Locomotion) Goal Selection (PT-IRF) gait, PT goal 1  -LB     Row Name 12/24/21 1405          Bed Mobility Goal 1 (PT-IRF)    Activity/Assistive Device (Bed Mobility Goal 1, PT-IRF) sit to supine/supine to sit  -LB     [place] Level (Bed Mobility Goal 1, PT-IRF) independent  -LB     Time Frame (Bed Mobility Goal 1, PT-IRF) by discharge  -LB     Row Name 12/24/21 1405          Transfer Goal 1 (PT-IRF)    Activity/Assistive Device (Transfer Goal 1, PT-IRF) sit-to-stand/stand-to-sit; bed-to-chair/chair-to-bed  -LB     [place] Level (Transfer Goal 1, PT-IRF) supervision required  -LB     Time Frame (Transfer Goal 1, PT-IRF) by discharge  -LB     Row Name 12/24/21 1405          Gait/Walking Locomotion Goal 1 (PT-IRF)    Activity/Assistive Device (Gait/Walking Locomotion Goal 1, PT-IRF) walker, rolling  -LB     Gait/Walking Locomotion Distance Goal 1 (PT-IRF) 300'  -LB     [place] Level (Gait/Walking Locomotion Goal 1, PT-IRF) supervision required  -LB     Time Frame (Gait/Walking Locomotion Goal 1, PT-IRF) by discharge  -LB     Row Name 12/24/21 8065          Positioning and Restraints    Pre-Treatment Position in bed  -LB     In Wheelchair with OT  -LB     Row Name 12/24/21 1403          Therapy Assessment/Plan (PT)    Patient's Goals For Discharge return home  -LB     Row Name 12/24/21 9257          Therapy Assessment/Plan (PT)    Rehab Potential/Prognosis (PT) adequate, monitor progress closely  -LB     Frequency of Treatment (PT) 5 times per week  -LB     Estimated Duration of Therapy (PT) 2 weeks  -LB     Problem List (PT) balance; cognition; mobility; range of motion (ROM); strength; pain  -LB     Activity Limitations Related to Problem List (PT) unable to ambulate safely; unable to transfer safely  -LB     Row Name 12/24/21 9092          Daily Progress Summary  (PT)    Recommendations (PT) continue PT  -LB     Row Name 12/24/21 1405          Therapy Plan Review/Discharge Plan (PT)    Anticipated Equipment Needs at Discharge (PT Eval) --  tbd  -LB     Expected Discharge Disposition (PT Eval) home with home health care; home with caregiver  -LB           User Key  (r) = Recorded By, (t) = Taken By, (c) = Cosigned By    Initials Name Provider Type    LB Juanita Perez, PT Physical Therapist              Wound 12/15/21 1530 Right anterior greater trochanter (Active)   Dressing Appearance intact 12/24/21 0710   Closure RAMON 12/23/21 2017   Base dressing in place, unable to visualize 12/23/21 2017     Physical Therapy Education                 Title: PT OT SLP Therapies (Done)     Topic: Physical Therapy (Done)     Point: Mobility training (Done)     Learning Progress Summary           Patient Acceptance, E, VU,NR by LB at 12/24/2021 1409      Show all documentation for this point (7)                 Point: Home exercise program (Done)     Learning Progress Summary           Patient Acceptance, E, VU,NR by LB at 12/24/2021 1409      Show all documentation for this point (7)                 Point: Body mechanics (Done)     Learning Progress Summary           Patient Acceptance, E, VU,NR by LB at 12/24/2021 1409      Show all documentation for this point (7)                 Point: Precautions (Done)     Learning Progress Summary           Patient Acceptance, E, VU,NR by LB at 12/24/2021 1409      Show all documentation for this point (7)                             User Key     Initials Effective Dates Name Provider Type Discipline    LB 06/16/21 -  Juanita Perez, PT Physical Therapist PT                PT Recommendation and Plan    Planned Therapy Interventions (PT): balance training, bed mobility training, gait training, patient/family education, ROM (range of motion), strengthening, transfer training  Frequency of Treatment (PT): 5 times per week  Anticipated  Equipment Needs at Discharge (PT Eval):  (tbd)  Daily Progress Summary (PT)  Recommendations (PT): continue PT               Time Calculation:      PT Charges     Row Name 12/24/21 1409             Time Calculation    Start Time 0745  -LB      Stop Time 0915  -LB      Time Calculation (min) 90 min  -LB      PT Received On 12/24/21  -LB            User Key  (r) = Recorded By, (t) = Taken By, (c) = Cosigned By    Initials Name Provider Type    LB Juanita Perez, PT Physical Therapist                Therapy Charges for Today     Code Description Service Date Service Provider Modifiers Qty    31429581886 HC GAIT TRAINING EA 15 MIN 12/24/2021 Juanita Perez, PT GP 1    36601930157 HC PT THER PROC EA 15 MIN 12/24/2021 Juanita Perez, PT GP 4    64851135386 HC PT THERAPEUTIC ACT EA 15 MIN 12/24/2021 Juanita Perez, PT GP 1                   Juanita Perez, PT  12/24/2021

## 2021-12-24 NOTE — PROGRESS NOTES
[facility]  PROGRESS NOTE     Patient Identification:  Name:  Mellisa Sneed  Age:  76 y.o.  Sex:  female  :  1945  MRN:  [id number]  Visit Number:  [id number]  ROOM: [facility]     Primary Care Provider:  Rosario Cates MD    Length of stay in inpatient status:  9    Subjective     Chief Compliant:      Status post hip fracture with repair  Diarrhea  Leukocytosis      History of Presenting Illness: 76-year-old female with history of CAD with CABG, history of coronary stents, hyperlipidemia, hypertension, mild dementia, recent right hip fracture with right hemiarthroplasty.      Patient is awake this morning and seems to be comfortable.  Has no complaints and no evidence of acute distress.  Overnight vitals were reviewed without any fever and blood pressure seems to be well regulated.  Patient is alert and denies any chest pain, shortness of breath, headache, loss of consciousness, nausea, vomiting, diarrhea or abdominal pain.  Denies any difficulty participating with physical or occupational therapies and has been pleased with overall progress.  Patient is willing to participate with therapy this morning.    Participated with physical and occupational therapy on 2021: Performs bed mobility with minimal assist and verbal/nonverbal cues; performs transfer activities with minimal assist and verbal/nonverbal cues; utilize front wheeled walker for sit to stand/stand to sit transfer; ambulated 160 feet and 100 feet with front wheeled walker and contact-guard assist with verbal/nonverbal cues; utilizes wheelchair for chair to bed transfer; participated with therapeutic exercise/ROM; gross motor coronation activities; fine motor manipulation/dexterity activity; required moderate assist for bathing; set up assist for upper body dressing; moderate assist with verbal cues for lower body dressing; set up assist for grooming and max assist with verbal cues and raised toilet seat/grab bar safety frame  for toileting      Objective     [facility] Meds:amLODIPine, 5 mg, Oral, Q24H  atorvastatin, 20 mg, Oral, Nightly  cetirizine, 5 mg, Oral, Daily  clopidogrel, 75 mg, Oral, Daily  donepezil, 5 mg, Oral, Nightly  enoxaparin, 40 mg, Subcutaneous, Q24H  escitalopram, 10 mg, Oral, Daily  influenza vaccine, 0.5 mL, Intramuscular, Once  lisinopril, 20 mg, Oral, Q24H  metoprolol succinate XL, 25 mg, Oral, Q24H  metroNIDAZOLE, 500 mg, Oral, Q8H  multivitamin, 1 tablet, Oral, Daily    Pharmacy Consult,       ----------------------------------------------------------------------------------------------------------------------  Vital Signs:  Temp:  [98.4 °F (36.9 °C)] 98.4 °F (36.9 °C)  Heart Rate:  [90] 90  Resp:  [20] 20  BP: (163)/(85) 163/85  SpO2:  [97 %] 97 %  on   ;   Device (Oxygen Therapy): room air  Body mass index is 24.67 kg/m².    Wt Readings from Last 3 Encounters:   12/15/21 51.7 kg (114 lb)     Intake & Output (last 3 days)       12/21 0701  12/22 0700 12/22 0701 12/23 0700 12/23 0701  12/24 0700 12/24 0701  12/25 0700    P.O.      IV Piggyback 500       Total Intake(mL/kg) 1340 (25.9) 960 (18.6) 1020 (19.7)     Net +1340 +960 +1020             Urine Unmeasured Occurrence 2 x 5 x 5 x     Stool Unmeasured Occurrence  1 x 2 x         Diet Regular  ----------------------------------------------------------------------------------------------------------------------  Physical exam:  Constitutional:   Feels great this morning but no diarrhea no abdominal pain  HEENT: Normocephalic atraumatic  Neck: Supple   Cardiovascular:    Pulmonary/Chest: Clear to auscultation  Abdominal:  .  Positive bowel sounds soft  Musculoskeletal: Right hip status post repair--incision site clean  Neurological: Slightly confused but no focal deficits  Skin: No rash    ----------------------------------------------------------------------------------------------------------------------    Last  echocardiogram:    ----------------------------------------------------------------------------------------------------------------------  Results from last 7 days   Lab Units 12/24/21 0053 12/22/21 0050 12/21/21  0701 12/21/21  0021   CRP mg/dL  --   --  3.16*  --    LACTATE mmol/L  --   --  1.0  --    WBC 10*3/mm3 10.47 13.52*  --  15.04*   HEMOGLOBIN g/dL 9.5* 9.5*  --  10.7*   HEMATOCRIT % 29.4* 30.0*  --  33.7*   MCV fL 96.1 96.5  --  96.8   MCHC g/dL 32.3 31.7  --  31.8   PLATELETS 10*3/mm3 420 454*  --  509*         Results from last 7 days   Lab Units 12/24/21 0053 12/22/21 0050 12/21/21  0021   SODIUM mmol/L 138 134* 138   POTASSIUM mmol/L 3.7 4.1 4.6   CHLORIDE mmol/L 109* 107 107   CO2 mmol/L 17.2* 17.7* 18.1*   BUN mg/dL 25* 20 15   CREATININE mg/dL 1.18* 1.13* 1.07*   EGFR IF NONAFRICN AM mL/min/1.73 44* 47* 50*   CALCIUM mg/dL 8.4* 8.6 9.2   GLUCOSE mg/dL 107* 112* 115*   ALBUMIN g/dL 3.15*  --   --    BILIRUBIN mg/dL 0.4  --   --    ALK PHOS U/L 79  --   --    AST (SGOT) U/L 21  --   --    ALT (SGPT) U/L 12  --   --    Estimated Creatinine Clearance: 32.1 mL/min (A) (by C-G formula based on SCr of 1.18 mg/dL (H)).  No results found for: AMMONIA              No results found for: HGBA1C, POCGLU  No results found for: TSH, FREET4  No results found for: PREGTESTUR, PREGSERUM, HCG, HCGQUANT  Pain Management Panel    There is no flowsheet data to display.       Brief Urine Lab Results  (Last result in the past 365 days)      Color   Clarity   Blood   Leuk Est   Nitrite   Protein   CREAT   Urine HCG        12/20/21 1613 Dark Yellow   Clear   Negative   Negative   Negative   30 mg/dL (1+)               No results found for: BLOODCX  Results from last 7 days   Lab Units 12/20/21  1613   NITRITE UA  Negative   WBC UA /HPF 3-5*   BACTERIA UA /HPF 1+*   SQUAM EPITHEL UA /HPF 3-6*     No results found for: URINECX  No results found for: WOUNDCX  No results found for: STOOLCX  Results from last 7 days   Lab  Units 12/21/21  0701   LACTATE mmol/L 1.0   CRP mg/dL 3.16*       I have personally looked at the labs and they are summarized above.  ----------------------------------------------------------------------------------------------------------------------  Detailed radiology reports for the last 24 hours:    Imaging Results (Last 24 Hours)     ** No results found for the last 24 hours. **        Final impressions for the last 30 days of radiology reports:    XR Abdomen 2+ VW with Chest 1 VW    Result Date: 12/21/2021  1. Nothing specific seen on today's exam to account for the patient's symptoms. 2. The lungs are clear. 3. No evidence of bowel obstruction. 4. No free air.  This report was finalized on 12/21/2021 11:36 AM by Dr. Chemo Guy MD.      I have personally looked at the radiology images and read the final radiology report.    Assessment & Plan      Status post hip fracture with right hip hemiarthroplasty--Participated with physical and occupational therapy on 12/22/2021: Performs bed mobility with minimal assist and verbal/nonverbal cues; performs transfer activities with minimal assist and verbal/nonverbal cues; utilize front wheeled walker for sit to stand/stand to sit transfer; ambulated 160 feet and 100 feet with front wheeled walker and contact-guard assist with verbal/nonverbal cues; utilizes wheelchair for chair to bed transfer; participated with therapeutic exercise/ROM; gross motor coronation activities; fine motor manipulation/dexterity activity; required moderate assist for bathing; set up assist for upper body dressing; moderate assist with verbal cues for lower body dressing; set up assist for grooming and max assist with verbal cues and raised toilet seat/grab bar safety frame for toileting    Diarrhea--resolved and Flagyl course finished    Leukocytosis--improving may be secondary to acute colitis.  Urinalysis was not impressive, chest x-ray negative.  Will monitor  closely    CAD--stable    Hypertension--uncontrolled and with tachycardia.  I increased Toprol-XL to 50 mg daily and continue to monitor closely    Dementia-- continue Aricept    VTE Prophylaxis:   Mechanical Order History:     None      Pharmalogical Order History:      Ordered     Dose Route Frequency Stop    12/15/21 1441  enoxaparin (LOVENOX) syringe 40 mg         40 mg SC Every 24 Hours 01/04/22 1759              Julissa Gautam MD  12/24/21  12:09 EST

## 2021-12-24 NOTE — PROGRESS NOTES
Occupational Therapy:    Physical Therapy: Individual: 90 minutes.    Speech Language Pathology:    Signed by: Juanita Perez, Physical Therapist

## 2021-12-24 NOTE — THERAPY TREATMENT NOTE
Inpatient Rehabilitation - Occupational Therapy Treatment Note     [place]     Patient Name: Mellisa Sneed  : 1945  MRN: [id number]    Today's Date: 2021                 Admit Date: 12/15/2021       No diagnosis found.    Patient Active Problem List   Diagnosis   • S/P right hip fracture       Past Medical History:   Diagnosis Date   • Arthritis    • CHF (congestive heart failure) (HCC)    • Hypertension        Past Surgical History:   Procedure Laterality Date   • CARDIAC SURGERY     • FRACTURE SURGERY               IRF OT ASSESSMENT FLOWSHEET (last 12 hours)     IRF OT Evaluation and Treatment     Row Name 21 0932          OT Time and Intention    Document Type daily treatment  -TM     Mode of Treatment occupational therapy  -TM     Patient Effort adequate  -TM     Symptoms Noted During/After Treatment none  -TM     Row Name 21          General Information    General Observations of Patient Pt agreeable for therapy.  -TM     Existing Precautions/Restrictions fall; hip  -TM     Row Name 2132          Cognition/Psychosocial    Orientation Status (Cognition) oriented to; person; situation; other (see comments)  confusion at times  -TM     Follows Commands (Cognition) follows one-step commands; verbal cues/prompting required; repetition of directions required  -TM     Row Name 2132          Transfers    Chair-Bed [place] (Transfers) minimum assist (75% patient effort); verbal cues  -TM     [place] Level (Toilet Transfer) minimum assist (75% patient effort); verbal cues  -TM     Assistive Device (Toilet Transfer) grab bars/safety frame; raised toilet seat; wheelchair  -TM     Row Name 2132          Chair-Bed Transfer    Assistive Device (Chair-Bed Transfers) wheelchair  -TM     Row Name 2132          Toilet Transfer    Type (Toilet Transfer) stand pivot/stand step  -TM     Row Name 2132          Motor Skills    Motor  Control/Coordination Interventions therapeutic exercise/ROM; gross motor coordination activities; fine motor manipulation/dexterity activities; other (see comments)  BUE ther ex/act, GMC/FMC  -TM     Row Name 12/24/21 0932          Grooming    [place] Level (Grooming) set up  -TM     Position (Grooming) supported sitting  -TM     Row Name 12/24/21 0932          Toileting    [place] Level (Toileting) moderate assist (50% patient effort); maximum assist (25% patient effort); verbal cues  -TM     Assistive Device Use (Toileting) raised toilet seat; grab bar/safety frame  -TM     Position (Toileting) supported sitting  -TM           User Key  (r) = Recorded By, (t) = Taken By, (c) = Cosigned By    Initials Name Effective Dates     Sherry Varela OT 06/16/21 -                  Occupational Therapy Education                 Title: PT OT SLP Therapies (Done)     Topic: Occupational Therapy (Done)     Point: ADL training (Done)     Description:   Instruct learner(s) on proper safety adaptation and remediation techniques during self care or transfers.   Instruct in proper use of assistive devices.              Learning Progress Summary           Patient Acceptance, E,D, VU,NR by  at 12/24/2021 0932      Show all documentation for this point (9)                 Point: Precautions (Done)     Description:   Instruct learner(s) on prescribed precautions during self-care and functional transfers.              Learning Progress Summary           Patient Acceptance, E,D, VU,NR by  at 12/24/2021 0932      Show all documentation for this point (9)                             User Key     Initials Effective Dates Name Provider Type Discipline     06/16/21 -  Sherry Varela OT Occupational Therapist OT                    OT Recommendation and Plan    Planned Therapy Interventions (OT): activity tolerance training, adaptive equipment training, BADL retraining, IADL retraining, ROM/therapeutic exercise,  strengthening exercise, transfer/mobility retraining, occupation/activity based interventions, patient/caregiver education/training                    Time Calculation:      Time Calculation- OT     Row Name 12/24/21 1104             Time Calculation- OT    OT Start Time 0915  -TM      OT Stop Time 1055  -TM      OT Time Calculation (min) 100 min  -TM      Total Timed Code Minutes-  minute(s)  -TM      OT Non-Billable Time (min) 15 min  -TM            User Key  (r) = Recorded By, (t) = Taken By, (c) = Cosigned By    Initials Name Provider Type     Sherry Varela, OT Occupational Therapist              Therapy Charges for Today     Code Description Service Date Service Provider Modifiers Qty    47763539756 HC OT SELF CARE/MGMT/TRAIN EA 15 MIN 12/23/2021 Sherry Varela, OT GO 2    59691318425 HC OT THERAPEUTIC ACT EA 15 MIN 12/23/2021 Sherry Varela, OT GO 3    80627625355 HC OT THER PROC EA 15 MIN 12/23/2021 Sherry Varela, OT GO 2    63466287744 HC OT SELF CARE/MGMT/TRAIN EA 15 MIN 12/24/2021 Sherry Varela, OT GO 2    53564998558 HC OT THERAPEUTIC ACT EA 15 MIN 12/24/2021 Sherry Varela, OT GO 3    16149885435 HC OT THER PROC EA 15 MIN 12/24/2021 Sherry Varela, OT GO 2                   Sherry Varela OT  12/24/2021

## 2021-12-25 PROCEDURE — 25010000002 ENOXAPARIN PER 10 MG: Performed by: FAMILY MEDICINE

## 2021-12-25 RX ADMIN — ESCITALOPRAM 10 MG: 10 TABLET, FILM COATED ORAL at 08:33

## 2021-12-25 RX ADMIN — CLOPIDOGREL 75 MG: 75 TABLET, FILM COATED ORAL at 08:35

## 2021-12-25 RX ADMIN — ENOXAPARIN SODIUM 40 MG: 40 INJECTION SUBCUTANEOUS at 17:13

## 2021-12-25 RX ADMIN — ATORVASTATIN CALCIUM 20 MG: 20 TABLET, FILM COATED ORAL at 20:02

## 2021-12-25 RX ADMIN — Medication 1 TABLET: at 08:33

## 2021-12-25 RX ADMIN — AMLODIPINE BESYLATE 5 MG: 5 TABLET ORAL at 08:33

## 2021-12-25 RX ADMIN — LISINOPRIL 20 MG: 10 TABLET ORAL at 08:33

## 2021-12-25 RX ADMIN — CETIRIZINE HYDROCHLORIDE 5 MG: 10 TABLET, FILM COATED ORAL at 08:33

## 2021-12-25 RX ADMIN — DONEPEZIL HYDROCHLORIDE 5 MG: 5 TABLET, FILM COATED ORAL at 20:02

## 2021-12-25 RX ADMIN — METOPROLOL SUCCINATE 50 MG: 50 TABLET, EXTENDED RELEASE ORAL at 08:33

## 2021-12-25 NOTE — PROGRESS NOTES
Rehabilitation Nursing  Inpatient Rehabilitation Plan of Care Note    Plan of Care  Copy from [place]    Pain Management (Active)  Current Status (12/15/2021 2:25:00 PM): Potential for pain  Weekly Goal: No pain this week  Discharge Goal: No pain    Safety    Potential for Injury (Active)  Current Status (12/15/2021 2:25:00 PM): At risk for injury  Weekly Goal: No injury this week  Discharge Goal: No injury    Signed by: Kaiden Ly RN

## 2021-12-26 RX ADMIN — Medication 1 TABLET: at 08:17

## 2021-12-26 RX ADMIN — ESCITALOPRAM 10 MG: 10 TABLET, FILM COATED ORAL at 08:17

## 2021-12-26 RX ADMIN — AMLODIPINE BESYLATE 5 MG: 5 TABLET ORAL at 08:17

## 2021-12-26 RX ADMIN — LISINOPRIL 20 MG: 10 TABLET ORAL at 08:16

## 2021-12-26 RX ADMIN — DONEPEZIL HYDROCHLORIDE 5 MG: 5 TABLET, FILM COATED ORAL at 20:18

## 2021-12-26 RX ADMIN — CLOPIDOGREL 75 MG: 75 TABLET, FILM COATED ORAL at 08:16

## 2021-12-26 RX ADMIN — METOPROLOL SUCCINATE 50 MG: 50 TABLET, EXTENDED RELEASE ORAL at 08:17

## 2021-12-26 RX ADMIN — CETIRIZINE HYDROCHLORIDE 5 MG: 10 TABLET, FILM COATED ORAL at 08:16

## 2021-12-26 RX ADMIN — ATORVASTATIN CALCIUM 20 MG: 20 TABLET, FILM COATED ORAL at 20:18

## 2021-12-26 NOTE — PROGRESS NOTES
Rehabilitation Nursing  Inpatient Rehabilitation Plan of Care Note    Plan of Care  Copy from [place]    Pain Management (Active)  Current Status (12/15/2021 2:25:00 PM): Potential for pain  Weekly Goal: No pain this week  Discharge Goal: No pain    Safety    Potential for Injury (Active)  Current Status (12/15/2021 2:25:00 PM): At risk for injury  Weekly Goal: No injury this week  Discharge Goal: No injury    Signed by: Agens Morocho, Nurse

## 2021-12-27 PROCEDURE — 97535 SELF CARE MNGMENT TRAINING: CPT

## 2021-12-27 PROCEDURE — 97530 THERAPEUTIC ACTIVITIES: CPT

## 2021-12-27 PROCEDURE — 97110 THERAPEUTIC EXERCISES: CPT

## 2021-12-27 PROCEDURE — 97116 GAIT TRAINING THERAPY: CPT

## 2021-12-27 PROCEDURE — 25010000002 ENOXAPARIN PER 10 MG: Performed by: FAMILY MEDICINE

## 2021-12-27 RX ADMIN — LISINOPRIL 20 MG: 10 TABLET ORAL at 09:02

## 2021-12-27 RX ADMIN — ESCITALOPRAM 10 MG: 10 TABLET, FILM COATED ORAL at 09:03

## 2021-12-27 RX ADMIN — ATORVASTATIN CALCIUM 20 MG: 20 TABLET, FILM COATED ORAL at 21:02

## 2021-12-27 RX ADMIN — DONEPEZIL HYDROCHLORIDE 5 MG: 5 TABLET, FILM COATED ORAL at 21:02

## 2021-12-27 RX ADMIN — CLOPIDOGREL 75 MG: 75 TABLET, FILM COATED ORAL at 09:03

## 2021-12-27 RX ADMIN — CETIRIZINE HYDROCHLORIDE 5 MG: 10 TABLET, FILM COATED ORAL at 09:03

## 2021-12-27 RX ADMIN — Medication 1 TABLET: at 09:03

## 2021-12-27 RX ADMIN — METOPROLOL SUCCINATE 50 MG: 50 TABLET, EXTENDED RELEASE ORAL at 09:03

## 2021-12-27 RX ADMIN — AMLODIPINE BESYLATE 5 MG: 5 TABLET ORAL at 09:03

## 2021-12-27 RX ADMIN — ENOXAPARIN SODIUM 40 MG: 40 INJECTION SUBCUTANEOUS at 17:39

## 2021-12-27 NOTE — THERAPY TREATMENT NOTE
Inpatient Rehabilitation - Occupational Therapy Treatment Note     [place]     Patient Name: Mellisa Sneed  : 1945  MRN: [id number]    Today's Date: 2021                 Admit Date: 12/15/2021       No diagnosis found.    Patient Active Problem List   Diagnosis   • S/P right hip fracture       Past Medical History:   Diagnosis Date   • Arthritis    • CHF (congestive heart failure) (HCC)    • Hypertension        Past Surgical History:   Procedure Laterality Date   • CARDIAC SURGERY     • FRACTURE SURGERY               IRF OT ASSESSMENT FLOWSHEET (last 12 hours)     IRF OT Evaluation and Treatment     Row Name 21 1504          OT Time and Intention    Document Type daily treatment  -TM     Mode of Treatment occupational therapy  -TM     Patient Effort adequate  -TM     Symptoms Noted During/After Treatment none  -TM     Row Name 21 1501          General Information    General Observations of Patient Pt agreeable for therapy.  -TM     Existing Precautions/Restrictions fall; hip  -TM     Limitations/Impairments safety/cognitive  -TM     Row Name 21 1504          Cognition/Psychosocial    Orientation Status (Cognition) oriented to; person; situation; other (see comments)  confusion at times  -TM     Follows Commands (Cognition) follows one-step commands; verbal cues/prompting required; repetition of directions required  -TM     Row Name 21 1504          Transfers    Bed-Chair [place] (Transfers) contact guard; minimum assist (75% patient effort); verbal cues  -TM     Chair-Bed [place] (Transfers) contact guard; minimum assist (75% patient effort); verbal cues  -TM     Assistive Device (Bed-Chair Transfers) wheelchair  -TM     Row Name 21 1504          Chair-Bed Transfer    Assistive Device (Chair-Bed Transfers) wheelchair  -TM     Row Name 21 150          Motor Skills    Motor Control/Coordination Interventions therapeutic exercise/ROM; fine motor  manipulation/dexterity activities; gross motor coordination activities; other (see comments)  BUE ther ex/act, GMC/FMC  -TM     Row Name 12/27/21 1504          Lower Body Dressing    Position (Lower Body Dressing) supported sitting  -TM     Comment (Lower Body Dressing) modA/[place]  -TM     Row Name 12/27/21 1504          Grooming    [place] Level (Grooming) set up  -TM     Position (Grooming) supported sitting  -TM           User Key  (r) = Recorded By, (t) = Taken By, (c) = Cosigned By    Initials Name Effective Dates     Sherry Varela, CHELSEY 06/16/21 -                  Occupational Therapy Education                 Title: PT OT SLP Therapies (Done)     Topic: Occupational Therapy (Done)     Point: ADL training (Done)     Description:   Instruct learner(s) on proper safety adaptation and remediation techniques during self care or transfers.   Instruct in proper use of assistive devices.              Learning Progress Summary           Patient Acceptance, E,D, VU,NR by  at 12/27/2021 1501      Show all documentation for this point (10)                 Point: Precautions (Done)     Description:   Instruct learner(s) on prescribed precautions during self-care and functional transfers.              Learning Progress Summary           Patient Acceptance, E,D, VU,NR by TM at 12/27/2021 1501      Show all documentation for this point (10)                             User Key     Initials Effective Dates Name Provider Type Discipline     06/16/21 -  Sherry Varela, OT Occupational Therapist OT                    OT Recommendation and Plan    Planned Therapy Interventions (OT): activity tolerance training, adaptive equipment training, BADL retraining, IADL retraining, ROM/therapeutic exercise, strengthening exercise, transfer/mobility retraining, occupation/activity based interventions, patient/caregiver education/training                    Time Calculation:      Time Calculation- OT     Row Name  12/27/21 1503 12/27/21 1502          Time Calculation- OT    OT Start Time 1235  -TM 0940  -TM     OT Stop Time 1330  -TM 1015  -TM     OT Time Calculation (min) 55 min  -TM 35 min  -TM     Total Timed Code Minutes- OT 55 minute(s)  -TM 35 minute(s)  -TM     OT Non-Billable Time (min) -- 15 min  -TM           User Key  (r) = Recorded By, (t) = Taken By, (c) = Cosigned By    Initials Name Provider Type     Sherry Varela OT Occupational Therapist              Therapy Charges for Today     Code Description Service Date Service Provider Modifiers Qty    51062777484 HC OT SELF CARE/MGMT/TRAIN EA 15 MIN 12/27/2021 Sherry Varela, OT GO 1    87265717165 HC OT THERAPEUTIC ACT EA 15 MIN 12/27/2021 Sherry Varela OT GO 3    69420799605 HC OT THER PROC EA 15 MIN 12/27/2021 Sherry Varela OT GO 2                   Sherry Varela OT  12/27/2021

## 2021-12-27 NOTE — PROGRESS NOTES
[facility]  PROGRESS NOTE     Patient Identification:  Name:  Mellisa Sneed  Age:  76 y.o.  Sex:  female  :  1945  MRN:  [id number]  Visit Number:  [id number]  ROOM: [facility]     Primary Care Provider:  Rosario Cates MD    Length of stay in inpatient status:  12    Subjective     Chief Compliant:      Status post hip fracture with repair  Diarrhea  Leukocytosis      History of Presenting Illness: 76-year-old female with history of CAD with CABG, history of coronary stents, hyperlipidemia, hypertension, mild dementia, recent right hip fracture with right hemiarthroplasty.      Participated with physical and Occupational Therapy on 2021: Performed bed mobility with standby assist and verbal/nonverbal cues; performs transfer activities with contact-guard assist and verbal/nonverbal cues; utilizes wheelchair for chair to bed transfer; utilize front wheeled walker for sit to stand/stand to sit transfer; ambulated 160 feet x 2 with front wheeled walker and standby assist with verbal/nonverbal cues; perform therapeutic exercise/ROM; gross motor coordination activities; fine motor manipulation/dexterity activities; required set up assist for grooming; moderate to max assist with verbal/verbal cues for toileting activity      Objective     Current Hospital Meds:amLODIPine, 5 mg, Oral, Q24H  atorvastatin, 20 mg, Oral, Nightly  cetirizine, 5 mg, Oral, Daily  clopidogrel, 75 mg, Oral, Daily  donepezil, 5 mg, Oral, Nightly  enoxaparin, 40 mg, Subcutaneous, Q24H  escitalopram, 10 mg, Oral, Daily  influenza vaccine, 0.5 mL, Intramuscular, Once  lisinopril, 20 mg, Oral, Q24H  metoprolol succinate XL, 50 mg, Oral, Q24H  multivitamin, 1 tablet, Oral, Daily    Pharmacy Consult,       ----------------------------------------------------------------------------------------------------------------------  Vital Signs:  Temp:  [97.5 °F (36.4 °C)] 97.5 °F (36.4 °C)  Heart Rate:  [80-91] 91  Resp:  [18] 18  BP:  (133-135)/(74-83) 133/74  SpO2:  [95 %] 95 %  on   ;   Device (Oxygen Therapy): room air  Body mass index is 24.67 kg/m².    Wt Readings from Last 3 Encounters:   12/15/21 51.7 kg (114 lb)     Intake & Output (last 3 days)       12/24 0701 12/25 0700 12/25 0701 12/26 0700 12/26 0701 12/27 0700 12/27 0701 12/28 0700    P.O. 840 960 840 240    Total Intake(mL/kg) 840 (16.2) 960 (18.6) 840 (16.2) 240 (4.6)    Net +840 +960 +840 +240            Urine Unmeasured Occurrence 6 x 4 x 5 x 2 x    Stool Unmeasured Occurrence 2 x 2 x 1 x 1 x        Diet Regular  ----------------------------------------------------------------------------------------------------------------------  Physical exam:  Constitutional:   Feels great this morning with no complaints. Leukocytosis resolved.  HEENT: Normocephalic atraumatic  Neck: Supple   Cardiovascular:    Pulmonary/Chest: Clear to auscultation  Abdominal:  .  Positive bowel sounds soft  Musculoskeletal: Right hip status post repair--incision site clean  Neurological: Slightly confused but no focal deficits  Skin: No rash    ----------------------------------------------------------------------------------------------------------------------    Last echocardiogram:    ----------------------------------------------------------------------------------------------------------------------  Results from last 7 days   Lab Units 12/24/21 0053 12/22/21 0050 12/21/21 0701 12/21/21  0021   CRP mg/dL  --   --  3.16*  --    LACTATE mmol/L  --   --  1.0  --    WBC 10*3/mm3 10.47 13.52*  --  15.04*   HEMOGLOBIN g/dL 9.5* 9.5*  --  10.7*   HEMATOCRIT % 29.4* 30.0*  --  33.7*   MCV fL 96.1 96.5  --  96.8   MCHC g/dL 32.3 31.7  --  31.8   PLATELETS 10*3/mm3 420 454*  --  509*         Results from last 7 days   Lab Units 12/24/21 0053 12/22/21 0050 12/21/21  0021   SODIUM mmol/L 138 134* 138   POTASSIUM mmol/L 3.7 4.1 4.6   CHLORIDE mmol/L 109* 107 107   CO2 mmol/L 17.2* 17.7* 18.1*   BUN mg/dL  25* 20 15   CREATININE mg/dL 1.18* 1.13* 1.07*   EGFR IF NONAFRICN AM mL/min/1.73 44* 47* 50*   CALCIUM mg/dL 8.4* 8.6 9.2   GLUCOSE mg/dL 107* 112* 115*   ALBUMIN g/dL 3.15*  --   --    BILIRUBIN mg/dL 0.4  --   --    ALK PHOS U/L 79  --   --    AST (SGOT) U/L 21  --   --    ALT (SGPT) U/L 12  --   --    Estimated Creatinine Clearance: 32.1 mL/min (A) (by C-G formula based on SCr of 1.18 mg/dL (H)).  No results found for: AMMONIA              No results found for: HGBA1C, POCGLU  No results found for: TSH, FREET4  No results found for: PREGTESTUR, PREGSERUM, HCG, HCGQUANT  Pain Management Panel    There is no flowsheet data to display.       Brief Urine Lab Results  (Last result in the past 365 days)      Color   Clarity   Blood   Leuk Est   Nitrite   Protein   CREAT   Urine HCG        12/20/21 1613 Dark Yellow   Clear   Negative   Negative   Negative   30 mg/dL (1+)               No results found for: BLOODCX  Results from last 7 days   Lab Units 12/20/21  1613   NITRITE UA  Negative   WBC UA /HPF 3-5*   BACTERIA UA /HPF 1+*   SQUAM EPITHEL UA /HPF 3-6*     No results found for: URINECX  No results found for: WOUNDCX  No results found for: STOOLCX  Results from last 7 days   Lab Units 12/21/21  0701   LACTATE mmol/L 1.0   CRP mg/dL 3.16*       I have personally looked at the labs and they are summarized above.  ----------------------------------------------------------------------------------------------------------------------  Detailed radiology reports for the last 24 hours:    Imaging Results (Last 24 Hours)     ** No results found for the last 24 hours. **        Final impressions for the last 30 days of radiology reports:    XR Abdomen 2+ VW with Chest 1 VW    Result Date: 12/21/2021  1. Nothing specific seen on today's exam to account for the patient's symptoms. 2. The lungs are clear. 3. No evidence of bowel obstruction. 4. No free air.  This report was finalized on 12/21/2021 11:36 AM by Dr. Chemo Guy,  MD.      I have personally looked at the radiology images and read the final radiology report.    Assessment & Plan      Status post hip fracture with right hip hemiarthroplasty--Participated with physical and Occupational Therapy on 12/24/2021: Performed bed mobility with standby assist and verbal/nonverbal cues; performs transfer activities with contact-guard assist and verbal/nonverbal cues; utilizes wheelchair for chair to bed transfer; utilize front wheeled walker for sit to stand/stand to sit transfer; ambulated 160 feet x 2 with front wheeled walker and standby assist with verbal/nonverbal cues; perform therapeutic exercise/ROM; gross motor coordination activities; fine motor manipulation/dexterity activities; required set up assist for grooming; moderate to max assist with verbal/verbal cues for toileting activity    Diarrhea--resolved and Flagyl course finished    Leukocytosis--improving may be secondary to acute colitis.  Urinalysis was not impressive, chest x-ray negative.  Will monitor closely    CAD--stable    Hypertension--uncontrolled and with tachycardia.  I increased Toprol-XL to 50 mg daily and continue to monitor closely    Dementia-- continue Aricept    VTE Prophylaxis:   Mechanical Order History:     None      Pharmalogical Order History:      Ordered     Dose Route Frequency Stop    12/15/21 7800  enoxaparin (LOVENOX) syringe 40 mg         40 mg SC Every 24 Hours 01/04/22 1754              Julissa Gautam MD  12/27/21  10:48 EST

## 2021-12-27 NOTE — THERAPY TREATMENT NOTE
Inpatient Rehabilitation - Physical Therapy Treatment Note       MIAN Pizano     Patient Name: Mellisa Sneed  : 1945  MRN: [id number]    Today's Date: 2021                    Admit Date: 12/15/2021      Visit Dx:   No diagnosis found.    Patient Active Problem List   Diagnosis   • S/P right hip fracture       Past Medical History:   Diagnosis Date   • Arthritis    • CHF (congestive heart failure) (HCC)    • Hypertension        Past Surgical History:   Procedure Laterality Date   • CARDIAC SURGERY     • FRACTURE SURGERY         PT ASSESSMENT (last 12 hours)     IRF PT Evaluation and Treatment     Row Name 21 162          PT Time and Intention    Document Type daily treatment  -LL     Mode of Treatment individual therapy; physical therapy  -LL     Patient/Family/Caregiver Comments/Observations Patient agreeable to PT session this date.  -LL     Row Name 21 162          General Information    Existing Precautions/Restrictions fall; right; hip  history of dementia  -LL     Row Name 21          Cognition/Psychosocial    Affect/Mental Status (Cognitive) WFL  but mild confusion  -     Orientation Status (Cognition) oriented to; person; situation; other (see comments)  -LL     Follows Commands (Cognition) verbal cues/prompting required; physical/tactile prompts required  -LL     Personal Safety Interventions gait belt; nonskid shoes/slippers when out of bed; supervised activity  -LL     Row Name 21 162          Pain Scale: FACES Pre/Post-Treatment    Pain: FACES Scale, Pretreatment 4-->hurts little more  -LL     Posttreatment Pain Rating 4-->hurts little more  -LL     Row Name 21 162          Mobility    Left Lower Extremity (Weight-bearing Status) weight-bearing as tolerated (WBAT)  -LL     Right Lower Extremity (Weight-bearing Status) weight-bearing as tolerated (WBAT)  -     Row Name 21 162          Bed Mobility    Supine-Sit [place] (Bed Mobility)  modified independence  -LL     Assistive Device (Bed Mobility) bed rails  -LL     Row Name 12/27/21 1626          Transfer Assessment/Treatment    Transfers car transfer  -LL     Row Name 12/27/21 1626          Transfers    Bed-Chair [place] (Transfers) verbal cues; nonverbal cues (demo/gesture); contact guard  -LL     Assistive Device (Bed-Chair Transfers) wheelchair  -LL     Sit-Stand [place] (Transfers) verbal cues; nonverbal cues (demo/gesture); contact guard  -LL     Stand-Sit [place] (Transfers) verbal cues; nonverbal cues (demo/gesture); contact guard  -LL     [place] Level (Toilet Transfer) verbal cues; nonverbal cues (demo/gesture); contact guard; standby assist  -LL     Assistive Device (Toilet Transfer) grab bars/safety frame; raised toilet seat  -LL     Row Name 12/27/21 1626          Sit-Stand Transfer    Assistive Device (Sit-Stand Transfers) walker, front-wheeled  -LL     Row Name 12/27/21 1626          Stand-Sit Transfer    Assistive Device (Stand-Sit Transfers) walker, front-wheeled  -LL     Row Name 12/27/21 1626          Toilet Transfer    Type (Toilet Transfer) stand pivot/stand step  -LL     Row Name 12/27/21 1626          Car Transfer    Type (Car Transfer) stand pivot/stand step  -LL     [place] Level (Car Transfer) contact guard; standby assist; verbal cues; nonverbal cues (demo/gesture)  -LL     Assistive Device (Car Transfer) wheelchair  -LL     Row Name 12/27/21 1626          Gait/Stairs (Locomotion)    [place] Level (Gait) verbal cues; nonverbal cues (demo/gesture); standby assist  -LL     Assistive Device (Gait) walker, front-wheeled  -LL     Distance in Feet (Gait) 100' x 2  -LL     Pattern (Gait) step-to  -LL     Deviations/Abnormal Patterns (Gait) antalgic; luis decreased; gait speed decreased; stride length decreased  -LL     Bilateral Gait Deviations forward flexed posture; weight shift ability decreased  -LL     Row Name 12/27/21 1626          Hip  (Therapeutic Exercise)    Hip Strengthening (Therapeutic Exercise) bilateral; flexion; extension; aBduction; aDduction; marching while seated; sitting; 1 lb free weight; resistance band; green  standing R hip abd & R hip flexion  -LL     Row Name 12/27/21 1626          Knee (Therapeutic Exercise)    Knee Strengthening (Therapeutic Exercise) bilateral; flexion; extension; marching while seated; LAQ (long arc quad); hamstring curls; sitting; 1 lb free weight; resistance band; green  -LL     Row Name 12/27/21 1626          Ankle (Therapeutic Exercise)    Ankle Strengthening (Therapeutic Exercise) bilateral; dorsiflexion; plantarflexion; sitting; standing  -LL     Row Name 12/27/21 1626          IRF PT Goals    Bed Mobility Goal Selection (PT-IRF) bed mobility, PT goal 1  -LL     Transfer Goal Selection (PT-IRF) transfers, PT goal 1  -LL     Gait (Walking Locomotion) Goal Selection (PT-IRF) gait, PT goal 1  -LL     Row Name 12/27/21 1626          Bed Mobility Goal 1 (PT-IRF)    Activity/Assistive Device (Bed Mobility Goal 1, PT-IRF) sit to supine/supine to sit  -LL     [place] Level (Bed Mobility Goal 1, PT-IRF) independent  -LL     Time Frame (Bed Mobility Goal 1, PT-IRF) by discharge  -LL     Progress/Outcomes (Bed Mobility Goal 1, PT-IRF) goal ongoing  -LL     Row Name 12/27/21 1626          Transfer Goal 1 (PT-IRF)    Activity/Assistive Device (Transfer Goal 1, PT-IRF) sit-to-stand/stand-to-sit; bed-to-chair/chair-to-bed  -LL     [place] Level (Transfer Goal 1, PT-IRF) supervision required  -LL     Time Frame (Transfer Goal 1, PT-IRF) by discharge  -LL     Progress/Outcomes (Transfer Goal 1, PT-IRF) goal ongoing  -LL     Row Name 12/27/21 1626          Gait/Walking Locomotion Goal 1 (PT-IRF)    Activity/Assistive Device (Gait/Walking Locomotion Goal 1, PT-IRF) walker, rolling  -LL     Gait/Walking Locomotion Distance Goal 1 (PT-IRF) 300'  -LL     [place] Level (Gait/Walking Locomotion Goal 1, PT-IRF)  supervision required  -LL     Time Frame (Gait/Walking Locomotion Goal 1, PT-IRF) by discharge  -LL     Progress/Outcomes (Gait/Walking Locomotion Goal 1, PT-IRF) goal ongoing  -LL     Row Name 12/27/21 1626          Positioning and Restraints    Pre-Treatment Position in bed  -LL     Post Treatment Position wheelchair  -LL     In Wheelchair sitting; with OT; legs elevated  -LL     Row Name 12/27/21 1626          Therapy Assessment/Plan (PT)    Patient's Goals For Discharge return home  -     Row Name 12/27/21 1626          Therapy Assessment/Plan (PT)    Rehab Potential/Prognosis (PT) adequate, monitor progress closely  -LL     Frequency of Treatment (PT) 5 times per week  -LL     Estimated Duration of Therapy (PT) 2 weeks  -LL     Problem List (PT) balance; cognition; mobility; range of motion (ROM); strength; pain  -LL     Activity Limitations Related to Problem List (PT) unable to ambulate safely; unable to transfer safely  -     Row Name 12/27/21 1626          Therapy Plan Review/Discharge Plan (PT)    Anticipated Equipment Needs at Discharge (PT Eval) --  tbd  -LL     Expected Discharge Disposition (PT Eval) home with home health care; home with caregiver  -           User Key  (r) = Recorded By, (t) = Taken By, (c) = Cosigned By    Initials Name Provider Type     Casie Hernandez PTA Physical Therapy Assistant              Wound 12/15/21 1530 Right anterior greater trochanter (Active)   Dressing Appearance dry; intact 12/26/21 1953   Closure RAMON 12/26/21 1953   Base dressing in place, unable to visualize 12/27/21 1143     Physical Therapy Education                 Title: PT OT SLP Therapies (Done)     Topic: Physical Therapy (Done)     Point: Mobility training (Done)     Learning Progress Summary           Patient Acceptance, E,D, VU,NR by  at 12/27/2021 1634      Show all documentation for this point (8)                 Point: Home exercise program (Done)     Learning Progress Summary            Patient Acceptance, E,D, VU,NR by  at 12/27/2021 1634      Show all documentation for this point (8)                 Point: Body mechanics (Done)     Learning Progress Summary           Patient Acceptance, E,D, VU,NR by  at 12/27/2021 1634      Show all documentation for this point (8)                 Point: Precautions (Done)     Learning Progress Summary           Patient Acceptance, E,D, VU,NR by  at 12/27/2021 1634      Show all documentation for this point (8)                             User Key     Initials Effective Dates Name Provider Type Discipline     05/02/16 -  Casie Hernandez PTA Physical Therapy Assistant PT                PT Recommendation and Plan    Frequency of Treatment (PT): 5 times per week  Anticipated Equipment Needs at Discharge (PT Eval):  (tbd)                  Time Calculation:      PT Charges     Row Name 12/27/21 1634             Time Calculation    Start Time 0735  -LL      Stop Time 0915  -      Time Calculation (min) 100 min  -      PT Received On 12/27/21  -              Time Calculation- PT    Total Timed Code Minutes-  minute(s)  -            User Key  (r) = Recorded By, (t) = Taken By, (c) = Cosigned By    Initials Name Provider Type     Casie Hernandez PTA Physical Therapy Assistant                Therapy Charges for Today     Code Description Service Date Service Provider Modifiers Qty    87234743883 HC GAIT TRAINING EA 15 MIN 12/27/2021 Casie Hernandez PTA GP 2    62598711026 HC PT THERAPEUTIC ACT EA 15 MIN 12/27/2021 Casie Hernandez PTA GP 2    70706314880 HC PT THER PROC EA 15 MIN 12/27/2021 Casie Hernandez PTA GP 3                   Casie. JAMARI Hernandez  12/27/2021

## 2021-12-27 NOTE — SIGNIFICANT NOTE
12/27/21 0452   Plan   Plan SS received call from daughter-in-law Rima ( to pt's son Jose R) who says pt's ex-daughter-in-law Magaly still plans to stay with pt at discharge.  Informed her plans are for discharge on 12-29-21.  Team conference will be held in am.

## 2021-12-27 NOTE — PROGRESS NOTES
Inpatient Rehabilitation Functional Measures Assessment and Plan of Care    Plan of Care  Self Care    [OT] Dressing (Lower)(Active)  Current Status(12/27/2021): modA/[place]  Weekly Goal(01/04/2022): [place]  Discharge Goal: [place]    Performed Intervention(s)  ADL retraining/AE education, fxal mobility, BUE ther ex/act, GMC/FMC, hip  precautions    Functional Measures  SEAN Eating:  SEAN Grooming:  SEAN Bathing:  SEAN Upper Body Dressing:  SEAN Lower Body Dressing:  SEAN Toileting:    SEAN Bladder Management  Level of Assistance:  Frequency/Number of Accidents this Shift:    SEAN Bowel Management  Level of Assistance:  Frequency/Number of Accidents this Shift:    SEAN Bed/Chair/Wheelchair Transfer:  SEAN Toilet Transfer:  SEAN Tub/Shower Transfer:    Previously Documented Mode of Locomotion at Discharge:  [facility] Expected Mode of Locomotion at Discharge:  [facility] Walk/Wheelchair:  [facility] Stairs:    [facility] Comprehension:  SEAN Expression:  SEAN Social Interaction:  SEAN Problem Solving:  SEAN Memory:    Therapy Mode Minutes  Occupational Therapy: Individual: 90 minutes.  Physical Therapy:  Speech Language Pathology:    Discharge Functional Goals:    Signed by: Sherry Varela Occupational Therapist

## 2021-12-27 NOTE — PLAN OF CARE
Goal Outcome Evaluation:  Plan of Care Reviewed With: patient        Progress: no change  Outcome Summary: Pt progressing with therapy, cont POC.

## 2021-12-27 NOTE — PROGRESS NOTES
Rehabilitation Nursing  Inpatient Rehabilitation Plan of Care Note    Plan of Care  Copy from [place]    Pain Management (Active)  Current Status (12/15/2021 2:25:00 PM): Potential for pain  Weekly Goal: No pain this week  Discharge Goal: No pain    Safety    Potential for Injury (Active)  Current Status (12/15/2021 2:25:00 PM): At risk for injury  Weekly Goal: No injury this week  Discharge Goal: No injury    Signed by: Collette Naranjo, Nurse

## 2021-12-28 LAB
ALBUMIN SERPL-MCNC: 3.16 G/DL (ref 3.5–5.2)
ALBUMIN/GLOB SERPL: 1.2 G/DL
ALP SERPL-CCNC: 102 U/L (ref 39–117)
ALT SERPL W P-5'-P-CCNC: 13 U/L (ref 1–33)
ANION GAP SERPL CALCULATED.3IONS-SCNC: 13.6 MMOL/L (ref 5–15)
AST SERPL-CCNC: 15 U/L (ref 1–32)
BASOPHILS # BLD AUTO: 0.13 10*3/MM3 (ref 0–0.2)
BASOPHILS NFR BLD AUTO: 1.2 % (ref 0–1.5)
BILIRUB SERPL-MCNC: 0.2 MG/DL (ref 0–1.2)
BUN SERPL-MCNC: 24 MG/DL (ref 8–23)
BUN/CREAT SERPL: 27.6 (ref 7–25)
CALCIUM SPEC-SCNC: 8.2 MG/DL (ref 8.6–10.5)
CHLORIDE SERPL-SCNC: 110 MMOL/L (ref 98–107)
CO2 SERPL-SCNC: 16.4 MMOL/L (ref 22–29)
CREAT SERPL-MCNC: 0.87 MG/DL (ref 0.57–1)
DEPRECATED RDW RBC AUTO: 56.9 FL (ref 37–54)
EOSINOPHIL # BLD AUTO: 0.25 10*3/MM3 (ref 0–0.4)
EOSINOPHIL NFR BLD AUTO: 2.4 % (ref 0.3–6.2)
ERYTHROCYTE [DISTWIDTH] IN BLOOD BY AUTOMATED COUNT: 15.7 % (ref 12.3–15.4)
GFR SERPL CREATININE-BSD FRML MDRD: 63 ML/MIN/1.73
GLOBULIN UR ELPH-MCNC: 2.7 GM/DL
GLUCOSE SERPL-MCNC: 113 MG/DL (ref 65–99)
HCT VFR BLD AUTO: 33.5 % (ref 34–46.6)
HGB BLD-MCNC: 10.5 G/DL (ref 12–15.9)
IMM GRANULOCYTES # BLD AUTO: 0.06 10*3/MM3 (ref 0–0.05)
IMM GRANULOCYTES NFR BLD AUTO: 0.6 % (ref 0–0.5)
LYMPHOCYTES # BLD AUTO: 2.32 10*3/MM3 (ref 0.7–3.1)
LYMPHOCYTES NFR BLD AUTO: 22.2 % (ref 19.6–45.3)
MCH RBC QN AUTO: 31 PG (ref 26.6–33)
MCHC RBC AUTO-ENTMCNC: 31.3 G/DL (ref 31.5–35.7)
MCV RBC AUTO: 98.8 FL (ref 79–97)
MONOCYTES # BLD AUTO: 0.9 10*3/MM3 (ref 0.1–0.9)
MONOCYTES NFR BLD AUTO: 8.6 % (ref 5–12)
NEUTROPHILS NFR BLD AUTO: 6.8 10*3/MM3 (ref 1.7–7)
NEUTROPHILS NFR BLD AUTO: 65 % (ref 42.7–76)
NRBC BLD AUTO-RTO: 0 /100 WBC (ref 0–0.2)
PLATELET # BLD AUTO: 373 10*3/MM3 (ref 140–450)
PMV BLD AUTO: 9.6 FL (ref 6–12)
POTASSIUM SERPL-SCNC: 3.7 MMOL/L (ref 3.5–5.2)
PROT SERPL-MCNC: 5.9 G/DL (ref 6–8.5)
RBC # BLD AUTO: 3.39 10*6/MM3 (ref 3.77–5.28)
SODIUM SERPL-SCNC: 140 MMOL/L (ref 136–145)
WBC NRBC COR # BLD: 10.46 10*3/MM3 (ref 3.4–10.8)

## 2021-12-28 PROCEDURE — 80053 COMPREHEN METABOLIC PANEL: CPT | Performed by: INTERNAL MEDICINE

## 2021-12-28 PROCEDURE — 25010000002 ENOXAPARIN PER 10 MG: Performed by: FAMILY MEDICINE

## 2021-12-28 PROCEDURE — 97535 SELF CARE MNGMENT TRAINING: CPT

## 2021-12-28 PROCEDURE — 97110 THERAPEUTIC EXERCISES: CPT

## 2021-12-28 PROCEDURE — 97116 GAIT TRAINING THERAPY: CPT

## 2021-12-28 PROCEDURE — 97530 THERAPEUTIC ACTIVITIES: CPT

## 2021-12-28 PROCEDURE — 85025 COMPLETE CBC W/AUTO DIFF WBC: CPT | Performed by: INTERNAL MEDICINE

## 2021-12-28 RX ADMIN — LISINOPRIL 20 MG: 10 TABLET ORAL at 08:54

## 2021-12-28 RX ADMIN — DONEPEZIL HYDROCHLORIDE 5 MG: 5 TABLET, FILM COATED ORAL at 21:14

## 2021-12-28 RX ADMIN — ATORVASTATIN CALCIUM 20 MG: 20 TABLET, FILM COATED ORAL at 21:14

## 2021-12-28 RX ADMIN — AMLODIPINE BESYLATE 5 MG: 5 TABLET ORAL at 08:58

## 2021-12-28 RX ADMIN — CLOPIDOGREL 75 MG: 75 TABLET, FILM COATED ORAL at 08:58

## 2021-12-28 RX ADMIN — METOPROLOL SUCCINATE 50 MG: 50 TABLET, EXTENDED RELEASE ORAL at 08:57

## 2021-12-28 RX ADMIN — Medication 1 TABLET: at 08:58

## 2021-12-28 RX ADMIN — ESCITALOPRAM 10 MG: 10 TABLET, FILM COATED ORAL at 08:58

## 2021-12-28 RX ADMIN — ENOXAPARIN SODIUM 40 MG: 40 INJECTION SUBCUTANEOUS at 17:52

## 2021-12-28 RX ADMIN — CETIRIZINE HYDROCHLORIDE 5 MG: 10 TABLET, FILM COATED ORAL at 08:54

## 2021-12-28 NOTE — SIGNIFICANT NOTE
12/28/21 1220   Plan   Plan Team conference held today.  Attempted to contact ex-daughter-in-law Magaly [phone] which says not available.  Spoke to pt and son Jose R [phone] about recommendations for discharge on 12-29-21, home health PT, OT, pediatric RW, bedside commode and discussed W/C.  Son does not feel pt will need W/C at discharge.  [facility] preferred by son and pt agreeable.  Son says to get DME from [facility] who will deliver to rehab.  Contacted [facility] [phone] per preference per [place] with referral, discharge on 12-29-21 and orders for PT 2-3 x wk x 8 wks for strengthening, endurance, gait training, transfer training, balance, therapeutic exercise, bed mobility, home safety, OT 2-3 x wk x 8 wks for ADL re-training, home safety, strengthening, endurance.  Faxed HH referral with orders, PT/OT notes, MD progress notes, face sheet, and H&P to  [phone].  HH to be contacted at discharge.  Attempted to contact [facility] [phone] per rotation without success.  Contacted [facility] [phone] per second rotation choice per Ivy with discharge on 12-29-21 and orders for pediatric RW and BSC.  Faxed DWO, face sheet, H&P, PT/OT notes and MD progress note to [phone].  DME to be delivered to rehab on 12-29-21.  Pt is going home with ex-daughter-in-law Magaly assisting with care.  Son will inform Magaly about discharge plans and says she may come with him for discharge at 10:00 am on 12-29-21.   Patient/Family in Agreement with Plan yes

## 2021-12-28 NOTE — SIGNIFICANT NOTE
12/28/21 2803   Plan   Plan Spoke to ex-daughter-in-law Magaly [phone] about plans for pt to be discharged on 12-29-21 with home health PT, OT, pediatric RW, BSC, and discussed how she is doing in therapy.  Magaly plans to come to rehab for discharge instructions and agreeable to assist pt at home.   Patient/Family in Agreement with Plan yes

## 2021-12-28 NOTE — THERAPY TREATMENT NOTE
Inpatient Rehabilitation - Physical Therapy Treatment Note       MIAN Pizano     Patient Name: Mellias Sneed  : 1945  MRN: [id number]    Today's Date: 2021                    Admit Date: 12/15/2021      Visit Dx:   No diagnosis found.    Patient Active Problem List   Diagnosis   • S/P right hip fracture       Past Medical History:   Diagnosis Date   • Arthritis    • CHF (congestive heart failure) (HCC)    • Hypertension        Past Surgical History:   Procedure Laterality Date   • CARDIAC SURGERY     • FRACTURE SURGERY         PT ASSESSMENT (last 12 hours)     IRF PT Evaluation and Treatment     Row Name 21 151          PT Time and Intention    Document Type daily treatment  BID treatment  -LL     Mode of Treatment individual therapy; physical therapy  -LL     Patient/Family/Caregiver Comments/Observations Patient agreeable to BID PT session  -LL     Row Name 21 151          General Information    Existing Precautions/Restrictions fall; right; hip  history of dementia  -LL     Row Name 21 151          Cognition/Psychosocial    Affect/Mental Status (Cognitive) WFL  but mild confusion  -     Orientation Status (Cognition) oriented to; person; situation; other (see comments)  -LL     Follows Commands (Cognition) verbal cues/prompting required; physical/tactile prompts required  -LL     Personal Safety Interventions gait belt; nonskid shoes/slippers when out of bed; supervised activity  -LL     Row Name 21 151          Pain Scale: FACES Pre/Post-Treatment    Pain: FACES Scale, Pretreatment 4-->hurts little more  -LL     Posttreatment Pain Rating 4-->hurts little more  -LL     Row Name 21 151          Mobility    Left Lower Extremity (Weight-bearing Status) weight-bearing as tolerated (WBAT)  -LL     Right Lower Extremity (Weight-bearing Status) weight-bearing as tolerated (WBAT)  -     Row Name 21 151          Bed Mobility    Supine-Sit [place] (Bed Mobility)  modified independence  -LL     Assistive Device (Bed Mobility) bed rails  -LL     Row Name 12/28/21 1511          Transfer Assessment/Treatment    Transfers car transfer  -LL     Row Name 12/28/21 1511          Transfers    Chair-Bed [place] (Transfers) contact guard; standby assist; verbal cues; nonverbal cues (demo/gesture)  -LL     Sit-Stand [place] (Transfers) verbal cues; nonverbal cues (demo/gesture); contact guard  -LL     Stand-Sit [place] (Transfers) verbal cues; nonverbal cues (demo/gesture); contact guard  -LL     Row Name 12/28/21 1511          Chair-Bed Transfer    Assistive Device (Chair-Bed Transfers) wheelchair  -LL     Row Name 12/28/21 1511          Sit-Stand Transfer    Assistive Device (Sit-Stand Transfers) walker, front-wheeled  -LL     Row Name 12/28/21 1511          Stand-Sit Transfer    Assistive Device (Stand-Sit Transfers) walker, front-wheeled  -LL     Row Name 12/28/21 1511          Gait/Stairs (Locomotion)    [place] Level (Gait) verbal cues; nonverbal cues (demo/gesture); standby assist  -LL     Assistive Device (Gait) walker, front-wheeled  -LL     Distance in Feet (Gait) 160' x 2  -LL     Pattern (Gait) step-to  -LL     Deviations/Abnormal Patterns (Gait) antalgic; luis decreased; gait speed decreased; stride length decreased  -LL     Bilateral Gait Deviations forward flexed posture; weight shift ability decreased  -LL     Comment (Gait/Stairs) Patient self limits WB on R LE d/t discomfort.  -LL     Row Name 12/28/21 1511          Balance    Comment, Balance unsupported sitting balloon tap  -LL     Row Name 12/28/21 1511          Hip (Therapeutic Exercise)    Hip Strengthening (Therapeutic Exercise) bilateral; flexion; extension; aBduction; aDduction; marching while seated; sitting; 1 lb free weight; resistance band; green  Standing R hip abd & R hip flexion  -LL     Row Name 12/28/21 1511          Knee (Therapeutic Exercise)    Knee Strengthening (Therapeutic  Exercise) bilateral; flexion; extension; marching while seated; LAQ (long arc quad); hamstring curls; sitting; 1 lb free weight; resistance band; green  -LL     Row Name 12/28/21 1511          Ankle (Therapeutic Exercise)    Ankle Strengthening (Therapeutic Exercise) bilateral; dorsiflexion; plantarflexion; sitting; standing  -LL     Row Name 12/28/21 1511          IRF PT Goals    Bed Mobility Goal Selection (PT-IRF) bed mobility, PT goal 1  -LL     Transfer Goal Selection (PT-IRF) transfers, PT goal 1  -LL     Gait (Walking Locomotion) Goal Selection (PT-IRF) gait, PT goal 1  -LL     Row Name 12/28/21 1511          Bed Mobility Goal 1 (PT-IRF)    Activity/Assistive Device (Bed Mobility Goal 1, PT-IRF) sit to supine/supine to sit  -LL     [place] Level (Bed Mobility Goal 1, PT-IRF) independent  -LL     Time Frame (Bed Mobility Goal 1, PT-IRF) by discharge  -LL     Progress/Outcomes (Bed Mobility Goal 1, PT-IRF) goal ongoing  -LL     Row Name 12/28/21 1511          Transfer Goal 1 (PT-IRF)    Activity/Assistive Device (Transfer Goal 1, PT-IRF) sit-to-stand/stand-to-sit; bed-to-chair/chair-to-bed  -LL     [place] Level (Transfer Goal 1, PT-IRF) supervision required  -LL     Time Frame (Transfer Goal 1, PT-IRF) by discharge  -LL     Progress/Outcomes (Transfer Goal 1, PT-IRF) goal ongoing  -LL     Row Name 12/28/21 1511          Gait/Walking Locomotion Goal 1 (PT-IRF)    Activity/Assistive Device (Gait/Walking Locomotion Goal 1, PT-IRF) walker, rolling  -LL     Gait/Walking Locomotion Distance Goal 1 (PT-IRF) 300'  -LL     [place] Level (Gait/Walking Locomotion Goal 1, PT-IRF) supervision required  -LL     Time Frame (Gait/Walking Locomotion Goal 1, PT-IRF) by discharge  -LL     Progress/Outcomes (Gait/Walking Locomotion Goal 1, PT-IRF) goal ongoing  -LL     Row Name 12/28/21 1511          Positioning and Restraints    Pre-Treatment Position --  WC  -LL     Post Treatment Position bed  -LL     In Bed  supine; call light within reach; encouraged to call for assist; exit alarm on; side rails up x2; heels elevated  in AM & PM  -     Row Name 12/28/21 1511          Therapy Assessment/Plan (PT)    Patient's Goals For Discharge return home  -     Row Name 12/28/21 1511          Therapy Assessment/Plan (PT)    Rehab Potential/Prognosis (PT) adequate, monitor progress closely  -     Frequency of Treatment (PT) 5 times per week  -     Estimated Duration of Therapy (PT) 2 weeks  -     Problem List (PT) balance; cognition; mobility; range of motion (ROM); strength; pain  -     Activity Limitations Related to Problem List (PT) unable to ambulate safely; unable to transfer safely  -     Row Name 12/28/21 1511          Therapy Plan Review/Discharge Plan (PT)    Anticipated Equipment Needs at Discharge (PT Eval) --  tbd  -     Expected Discharge Disposition (PT Eval) home with home health care; home with caregiver  -           User Key  (r) = Recorded By, (t) = Taken By, (c) = Cosigned By    Initials Name Provider Type     Casie Hernandez PTA Physical Therapy Assistant              Wound 12/15/21 1530 Right anterior greater trochanter (Active)   Dressing Appearance dry; intact 12/28/21 1417   Closure RAMON 12/27/21 1910   Base dressing in place, unable to visualize 12/28/21 1417   Periwound ecchymotic 12/28/21 1417     Physical Therapy Education                 Title: PT OT SLP Therapies (Done)     Topic: Physical Therapy (Done)     Point: Mobility training (Done)     Learning Progress Summary           Patient Acceptance, E,D, VU,NR by  at 12/28/2021 1516      Show all documentation for this point (9)                 Point: Home exercise program (Done)     Learning Progress Summary           Patient Acceptance, E,D, VU,NR by  at 12/28/2021 1516      Show all documentation for this point (9)                 Point: Body mechanics (Done)     Learning Progress Summary           Patient Acceptance, E,D, VU,NR  by  at 12/28/2021 1516      Show all documentation for this point (9)                 Point: Precautions (Done)     Learning Progress Summary           Patient Acceptance, E,D, VU,NR by  at 12/28/2021 1516      Show all documentation for this point (9)                             User Key     Initials Effective Dates Name Provider Type [facility] 05/02/16 -  Casie Hernandez PTA Physical Therapy Assistant PT                PT Recommendation and Plan    Frequency of Treatment (PT): 5 times per week  Anticipated Equipment Needs at Discharge (PT Eval):  (tbd)                  Time Calculation:      PT Charges     Row Name 12/28/21 1517 12/28/21 1516          Time Calculation    Start Time 1415  -LL 1045  -LL     Stop Time 1500  -LL 1140  -LL     Time Calculation (min) 45 min  -LL 55 min  -LL     PT Received On -- 12/28/21  -LL            Time Calculation- PT    Total Timed Code Minutes- PT 45 minute(s)  -LL 55 minute(s)  -LL           User Key  (r) = Recorded By, (t) = Taken By, (c) = Cosigned By    Initials Name Provider Type     Casie Hernandez PTA Physical Therapy Assistant                Therapy Charges for Today     Code Description Service Date Service Provider Modifiers Qty    23192630968 HC GAIT TRAINING EA 15 MIN 12/27/2021 Casie Hernandez, PTA GP 2    26194249670 HC PT THERAPEUTIC ACT EA 15 MIN 12/27/2021 Casie Hernandez, PTA GP 2    61090846186 HC PT THER PROC EA 15 MIN 12/27/2021 Casie Hernandez, PTA GP 3    15289017014 HC GAIT TRAINING EA 15 MIN 12/28/2021 Casie Hernandez, PTA GP 1    62808073916 HC PT THERAPEUTIC ACT EA 15 MIN 12/28/2021 HernandezCasie, PTA GP 2    64358873180 HC PT THER PROC EA 15 MIN 12/28/2021 Casie Hernandez, PTA GP 4                   Casie. JAMARI Hernandez  12/28/2021

## 2021-12-28 NOTE — PROGRESS NOTES
Occupational Therapy: Individual: 90 minutes.    Physical Therapy:    Speech Language Pathology:    Signed by: Sherry Varela, Occupational Therapist

## 2021-12-28 NOTE — SIGNIFICANT NOTE
12/28/21 1340   Plan   Plan SS received call from Ivy with [employer] who states pt's insurance paid for a rollator 2 years ago, therefore, will not pay for another year until 5 years have past.  Pt can purchase this item for $60/no tax with MD armando.  Contacted sunil Odell [phone] with this information who is agreeable to purchase and will call [facility] to pay via telephone with credit or debit card.  Informed Ivy with [employer] about plans.   Patient/Family in Agreement with Plan yes

## 2021-12-28 NOTE — PROGRESS NOTES
[facility]  PROGRESS NOTE     Patient Identification:  Name:  Mellisa Sneed  Age:  76 y.o.  Sex:  female  :  1945  MRN:  [id number]  Visit Number:  [id number]  ROOM: [facility]/     Primary Care Provider:  Rosario Cates MD    Length of stay in inpatient status:  13    Subjective     Chief Compliant:      Status post hip fracture with repair  Diarrhea  Leukocytosis      History of Presenting Illness: 76-year-old female with history of CAD with CABG, history of coronary stents, hyperlipidemia, hypertension, mild dementia, recent right hip fracture with right hemiarthroplasty.      Participated with physical and occupational therapy on 2021: Performs bed mobility activities with modified independence; performs transfer activities with contact-guard assist and verbal/nonverbal cues; utilizes front wheeled walker for sit to stand/stand to sit transfer; ambulated 100 feet x 2 with front wheeled walker and standby assist with verbal/nonverbal cues; participated with therapeutic exercise/ROM; fine motor manipulation/dexterity activities; gross motor coordination activities; required minimal to moderate assist with lower body dressing; set up assist for grooming      Objective     Current Hospital Meds:amLODIPine, 5 mg, Oral, Q24H  atorvastatin, 20 mg, Oral, Nightly  cetirizine, 5 mg, Oral, Daily  clopidogrel, 75 mg, Oral, Daily  donepezil, 5 mg, Oral, Nightly  enoxaparin, 40 mg, Subcutaneous, Q24H  escitalopram, 10 mg, Oral, Daily  influenza vaccine, 0.5 mL, Intramuscular, Once  lisinopril, 20 mg, Oral, Q24H  metoprolol succinate XL, 50 mg, Oral, Q24H  multivitamin, 1 tablet, Oral, Daily    Pharmacy Consult,       ----------------------------------------------------------------------------------------------------------------------  Vital Signs:  Temp:  [98.3 °F (36.8 °C)] 98.3 °F (36.8 °C)  Heart Rate:  [82-84] 84  Resp:  [20] 20  BP: (150-151)/(73-75) 150/73  SpO2:  [96 %] 96 %  on   ;   Device  (Oxygen Therapy): room air  Body mass index is 24.67 kg/m².    Wt Readings from Last 3 Encounters:   12/15/21 51.7 kg (114 lb)     Intake & Output (last 3 days)       12/25 0701 12/26 0700 12/26 0701 12/27 0700 12/27 0701 12/28 0700 12/28 0701 12/29 0700    P.O.      Total Intake(mL/kg) 960 (18.6) 840 (16.2) 1200 (23.2)     Net +960 +840 +1200             Urine Unmeasured Occurrence 4 x 5 x 5 x     Stool Unmeasured Occurrence 2 x 1 x 2 x         Diet Regular  ----------------------------------------------------------------------------------------------------------------------  Physical exam:  Constitutional:   Feels great this morning with normalized creatinine and no complaints this morning.  HEENT: Normocephalic atraumatic  Neck: Supple   Cardiovascular:    Pulmonary/Chest: Clear to auscultation  Abdominal:  .  Positive bowel sounds soft  Musculoskeletal: Right hip status post repair--incision site clean  Neurological: Slightly confused but no focal deficits  Skin: No rash    ----------------------------------------------------------------------------------------------------------------------    Last echocardiogram:    ----------------------------------------------------------------------------------------------------------------------  Results from last 7 days   Lab Units 12/28/21 0119 12/24/21 0053 12/22/21  0050   WBC 10*3/mm3 10.46 10.47 13.52*   HEMOGLOBIN g/dL 10.5* 9.5* 9.5*   HEMATOCRIT % 33.5* 29.4* 30.0*   MCV fL 98.8* 96.1 96.5   MCHC g/dL 31.3* 32.3 31.7   PLATELETS 10*3/mm3 373 420 454*         Results from last 7 days   Lab Units 12/28/21 0119 12/24/21 0053 12/22/21  0050   SODIUM mmol/L 140 138 134*   POTASSIUM mmol/L 3.7 3.7 4.1   CHLORIDE mmol/L 110* 109* 107   CO2 mmol/L 16.4* 17.2* 17.7*   BUN mg/dL 24* 25* 20   CREATININE mg/dL 0.87 1.18* 1.13*   EGFR IF NONAFRICN AM mL/min/1.73 63 44* 47*   CALCIUM mg/dL 8.2* 8.4* 8.6   GLUCOSE mg/dL 113* 107* 112*   ALBUMIN g/dL 3.16*  3.15*  --    BILIRUBIN mg/dL 0.2 0.4  --    ALK PHOS U/L 102 79  --    AST (SGOT) U/L 15 21  --    ALT (SGPT) U/L 13 12  --    Estimated Creatinine Clearance: 43.5 mL/min (by C-G formula based on SCr of 0.87 mg/dL).  No results found for: AMMONIA              No results found for: HGBA1C, POCGLU  No results found for: TSH, FREET4  No results found for: PREGTESTUR, PREGSERUM, HCG, HCGQUANT  Pain Management Panel    There is no flowsheet data to display.       Brief Urine Lab Results  (Last result in the past 365 days)      Color   Clarity   Blood   Leuk Est   Nitrite   Protein   CREAT   Urine HCG        12/20/21 1613 Dark Yellow   Clear   Negative   Negative   Negative   30 mg/dL (1+)               No results found for: BLOODCX      No results found for: URINECX  No results found for: WOUNDCX  No results found for: STOOLCX        I have personally looked at the labs and they are summarized above.  ----------------------------------------------------------------------------------------------------------------------  Detailed radiology reports for the last 24 hours:    Imaging Results (Last 24 Hours)     ** No results found for the last 24 hours. **        Final impressions for the last 30 days of radiology reports:    XR Abdomen 2+ VW with Chest 1 VW    Result Date: 12/21/2021  1. Nothing specific seen on today's exam to account for the patient's symptoms. 2. The lungs are clear. 3. No evidence of bowel obstruction. 4. No free air.  This report was finalized on 12/21/2021 11:36 AM by Dr. Chemo Guy MD.      I have personally looked at the radiology images and read the final radiology report.    Assessment & Plan      Status post hip fracture with right hip hemiarthroplasty--Participated with physical and occupational therapy on 12/27/2021: Performs bed mobility activities with modified independence; performs transfer activities with contact-guard assist and verbal/nonverbal cues; utilizes front wheeled walker for  sit to stand/stand to sit transfer; ambulated 100 feet x 2 with front wheeled walker and standby assist with verbal/nonverbal cues; participated with therapeutic exercise/ROM; fine motor manipulation/dexterity activities; gross motor coordination activities; required minimal to moderate assist with lower body dressing; set up assist for grooming    Diarrhea--resolved and Flagyl course finished    Leukocytosis--improving may be secondary to acute colitis.  Urinalysis was not impressive, chest x-ray negative.  Will monitor closely    CAD--stable    Hypertension--uncontrolled and with tachycardia.  I increased Toprol-XL to 50 mg daily and continue to monitor closely    Dementia-- continue Aricept    VTE Prophylaxis:   Mechanical Order History:     None      Pharmalogical Order History:      Ordered     Dose Route Frequency Stop    12/15/21 1445  enoxaparin (LOVENOX) syringe 40 mg         40 mg SC Every 24 Hours 01/04/22 1759              Julissa Gautam MD  12/28/21  11:59 EST

## 2021-12-28 NOTE — THERAPY TREATMENT NOTE
Inpatient Rehabilitation - Occupational Therapy Treatment Note    MIAN Mathews     Patient Name: Mellisa Sneed  : 1945  MRN: [id number]    Today's Date: 2021                 Admit Date: 12/15/2021       No diagnosis found.    Patient Active Problem List   Diagnosis   • S/P right hip fracture       Past Medical History:   Diagnosis Date   • Arthritis    • CHF (congestive heart failure) (HCC)    • Hypertension        Past Surgical History:   Procedure Laterality Date   • CARDIAC SURGERY     • FRACTURE SURGERY               IRF OT ASSESSMENT FLOWSHEET (last 12 hours)     IRF OT Evaluation and Treatment     Row Name 21 1112          OT Time and Intention    Document Type daily treatment  -TM     Mode of Treatment occupational therapy  -TM     Patient Effort adequate  -TM     Symptoms Noted During/After Treatment none  -TM     Row Name 21 1112          General Information    General Observations of Patient Pt agreeable for therapy.  -TM     Existing Precautions/Restrictions fall; hip  -TM     Limitations/Impairments safety/cognitive  -TM     Row Name 21 1112          Cognition/Psychosocial    Orientation Status (Cognition) oriented to; person; situation; other (see comments)  confusion at times  -TM     Follows Commands (Cognition) follows one-step commands; verbal cues/prompting required  -TM     Row Name 21 1112          Transfers    Bed-Chair Darke (Transfers) contact guard; verbal cues  -TM     Assistive Device (Bed-Chair Transfers) wheelchair  -TM     Row Name 21 1112          Motor Skills    Motor Control/Coordination Interventions therapeutic exercise/ROM; gross motor coordination activities; fine motor manipulation/dexterity activities; other (see comments)  BUE ther ex/act, GMC/FMC  -TM     Row Name 21 1112          Grooming    Darke Level (Grooming) set up  -TM     Position (Grooming) supported sitting  -TM           User Key  (r) = Recorded By, (t) =  Taken By, (c) = Cosigned By    Initials Name Effective Dates    TM Sherry Varela OT 06/16/21 -                  Occupational Therapy Education                 Title: PT OT SLP Therapies (Done)     Topic: Occupational Therapy (Done)     Point: ADL training (Done)     Description:   Instruct learner(s) on proper safety adaptation and remediation techniques during self care or transfers.   Instruct in proper use of assistive devices.              Learning Progress Summary           Patient Acceptance, E,D, VU,NR by TM at 12/28/2021 1116      Show all documentation for this point (11)                 Point: Precautions (Done)     Description:   Instruct learner(s) on prescribed precautions during self-care and functional transfers.              Learning Progress Summary           Patient Acceptance, E,D, VU,NR by TM at 12/28/2021 1116      Show all documentation for this point (11)                             User Key     Initials Effective Dates Name Provider Type Discipline     06/16/21 -  Sherry Varela OT Occupational Therapist OT                    OT Recommendation and Plan    Planned Therapy Interventions (OT): activity tolerance training, adaptive equipment training, BADL retraining, IADL retraining, ROM/therapeutic exercise, strengthening exercise, transfer/mobility retraining, occupation/activity based interventions, patient/caregiver education/training                    Time Calculation:      Time Calculation- OT     Row Name 12/28/21 1319 12/28/21 1110          Time Calculation- OT    OT Start Time 1235  -TM 0935  -TM     OT Stop Time 1330  -TM 1010  -TM     OT Time Calculation (min) 55 min  -TM 35 min  -TM     Total Timed Code Minutes- OT 55 minute(s)  -TM 35 minute(s)  -TM     OT Non-Billable Time (min) -- 15 min  -TM           User Key  (r) = Recorded By, (t) = Taken By, (c) = Cosigned By    Initials Name Provider Type    TM Sherry Varela, OT Occupational Therapist               Therapy Charges for Today     Code Description Service Date Service Provider Modifiers Qty    86480918698 HC OT SELF CARE/MGMT/TRAIN EA 15 MIN 12/27/2021 Sherry Varela, OT GO 1    80549834003 HC OT THERAPEUTIC ACT EA 15 MIN 12/27/2021 Sherry Varela, OT GO 3    39215361495 HC OT THER PROC EA 15 MIN 12/27/2021 Sherry Varela, OT GO 2    22312551848 HC OT THERAPEUTIC ACT EA 15 MIN 12/28/2021 Sherry Varela, OT GO 1    20433485710 HC OT SELF CARE/MGMT/TRAIN EA 15 MIN 12/28/2021 Sherry Varela, OT GO 1    91081633804 HC OT THERAPEUTIC ACT EA 15 MIN 12/28/2021 Sherry Varela, OT GO 2    62308690084 HC OT THER PROC EA 15 MIN 12/28/2021 Sherry Varela, OT GO 1    05853693065 HC OT SELF CARE/MGMT/TRAIN EA 15 MIN 12/28/2021 Sherry Varela, OT GO 1                   Sherry Varela OT  12/28/2021

## 2021-12-29 VITALS
RESPIRATION RATE: 20 BRPM | HEART RATE: 77 BPM | OXYGEN SATURATION: 96 % | WEIGHT: 114 LBS | TEMPERATURE: 97.8 F | SYSTOLIC BLOOD PRESSURE: 116 MMHG | HEIGHT: 57 IN | DIASTOLIC BLOOD PRESSURE: 78 MMHG | BODY MASS INDEX: 24.59 KG/M2

## 2021-12-29 PROCEDURE — 99238 HOSP IP/OBS DSCHRG MGMT 30/<: CPT | Performed by: FAMILY MEDICINE

## 2021-12-29 PROCEDURE — 97110 THERAPEUTIC EXERCISES: CPT

## 2021-12-29 PROCEDURE — 97530 THERAPEUTIC ACTIVITIES: CPT

## 2021-12-29 PROCEDURE — 63710000001 ONDANSETRON PER 8 MG: Performed by: FAMILY MEDICINE

## 2021-12-29 PROCEDURE — 97535 SELF CARE MNGMENT TRAINING: CPT

## 2021-12-29 RX ORDER — HYDROCODONE BITARTRATE AND ACETAMINOPHEN 7.5; 325 MG/1; MG/1
1 TABLET ORAL EVERY 6 HOURS PRN
Qty: 15 TABLET | Refills: 0 | Status: SHIPPED | OUTPATIENT
Start: 2021-12-29 | End: 2022-01-03

## 2021-12-29 RX ORDER — METOPROLOL SUCCINATE 50 MG/1
50 TABLET, EXTENDED RELEASE ORAL
Qty: 30 TABLET | Refills: 0 | Status: SHIPPED | OUTPATIENT
Start: 2021-12-30 | End: 2022-01-29

## 2021-12-29 RX ADMIN — CLOPIDOGREL 75 MG: 75 TABLET, FILM COATED ORAL at 08:25

## 2021-12-29 RX ADMIN — METOPROLOL SUCCINATE 50 MG: 50 TABLET, EXTENDED RELEASE ORAL at 08:26

## 2021-12-29 RX ADMIN — AMLODIPINE BESYLATE 5 MG: 5 TABLET ORAL at 08:26

## 2021-12-29 RX ADMIN — Medication 1 TABLET: at 08:26

## 2021-12-29 RX ADMIN — ONDANSETRON HYDROCHLORIDE 4 MG: 4 TABLET, FILM COATED ORAL at 10:08

## 2021-12-29 RX ADMIN — ESCITALOPRAM 10 MG: 10 TABLET, FILM COATED ORAL at 08:26

## 2021-12-29 RX ADMIN — LISINOPRIL 20 MG: 10 TABLET ORAL at 11:37

## 2021-12-29 RX ADMIN — CETIRIZINE HYDROCHLORIDE 5 MG: 10 TABLET, FILM COATED ORAL at 08:25

## 2021-12-29 NOTE — SIGNIFICANT NOTE
12/29/21 1100   Plan   Plan Spoke to pt, son Jose R and his ex-wife Magaly about discharge and Lovenox 40 mg subcutaneous injections x 8 days.  Son says he can administer Lovenox injections at home; RN educated him about how to give Lovenox injections.  Contacted [facility] [phone] per Skye about discharge and RN teaching son how to give Lovenox injections.  HH to start care on 12-30-21.  Pt will receive  PT/OT.  Faxed discharge summary and ambulatory referral for HH with face to face to [facility]  [phone].  Son will transport pt home.  Son's ex-wife will be staying with pt and assisting with care.  Faxed DME order from [facility] to [facility] [phone].  Pediatric RW and BSC delivered to rehab.   Patient/Family in Agreement with Plan yes   Final Discharge Disposition Code 06 - home with home health care

## 2021-12-29 NOTE — DISCHARGE SUMMARY
[facility] HOSPITALISTS DISCHARGE SUMMARY    Patient Identification:  Name:  Mellisa Sneed  Age:  76 y.o.  Sex:  female  :  1945  MRN:  [id number]  Visit Number:  [id number]    Date of Admission: 12/15/2021  Date of Discharge:  2021     PCP: Rosario Cates MD    DISCHARGE DIAGNOSIS  Status post right hip fracture with right hemiarthroplasty  CAD  Hypertension  Dementia  Depression    CONSULTS       PROCEDURES PERFORMED      HOSPITAL COURSE  Patient is a 76 y.o. female presented to [facility] acute inpatient physical rehab in transfer from [facility] in [place].  Patient 76-year-old female with a history of coronary disease history of CABG, coronary stent, hyperlipidemia and hypertension, dementia.  Patient that she was at home caring for her puppy when she was startled and tripped and had a fall.  Patient fractured her right hip was taken to [facility] in [place].  Patient was taken to the OR for open reduction internal fixation with right hip hemiarthroplasty per Dr. Montemayor.  Patient had uncomplicated hospital course but was thought to be a good candidate for inpatient physical rehab and we agreed except patient in transfer.    Status post right hip fracture with ORIF--at the time of admission, patient was requiring minimum assistance for help with transfers; moderate to maximum assistance for help with bathing; minimum assistance for up with upper body dressing; dependent for lower body dressing; set up for grooming; dependent for toileting.  At the time admission, physical therapy documented moderate patient requiring moderate assistance for bed to chair and chair to bed transfers along with minimal transfer minimum assistance for sit to stand and stand to sit transfers.  Was able to ambulate 60 feet the morning 70 feet in the afternoon with front wheel walker and contact-guard.  At the time of discharge--patient modified independent for bed mobility; contact-guard for  transfers; ambulated 160 feet x 2 with front wheel walker.  Patient requiring moderate to minimum assistance for lower body dressing.  Incision site is clean and staples were removed today.  Recommend follow-up with Dr. Montemayor at discharge    CAD--patient has had no symptoms during the admission and we have continued medical management.  Is done well    Dementia--patient has some modest confusion at times.  Easily reoriented and is done very well.  Have continued Aricept during the admission    Depression well controlled throughout the admission.  Patient is participated fully in activities.    Hypertension reasonably well-controlled during admission      VITAL SIGNS:  Temp:  [97.8 °F (36.6 °C)-98.7 °F (37.1 °C)] 97.8 °F (36.6 °C)  Heart Rate:  [77-95] 77  Resp:  [20] 20  BP: (115-116)/(56-78) 116/78  SpO2:  [96 %] 96 %  on   ;   Device (Oxygen Therapy): room air    Body mass index is 24.67 kg/m².  Wt Readings from Last 3 Encounters:   12/15/21 51.7 kg (114 lb)       PHYSICAL EXAM:  Constitutional: No acute distress  HEENT: Normocephalic atraumatic  Neck:   Supple  Cardiovascular: Regular rate and rhythm  Pulmonary/Chest: Clear to auscultation  Abdominal: Positive bowel sounds soft.   Musculoskeletal: Right hip--much improved.  Staples removed today no evidence of any abnormalities  Neurological: No focal deficits  Skin: No rash  Peripheral vascular:  Genitourinary::    DISCHARGE DISPOSITION   Stable    DISCHARGE MEDICATIONS:     Discharge Medications      New Medications      Instructions Start Date   enoxaparin 40 MG/0.4ML solution syringe  Commonly known as: LOVENOX   40 mg, Subcutaneous, Every 24 Hours      HYDROcodone-acetaminophen 7.5-325 MG per tablet  Commonly known as: NORCO   Take 1 tablet by mouth Every 6 (Six) Hours As Needed for Moderate Pain for up to 5 days.         Changes to Medications      Instructions Start Date   metoprolol succinate XL 50 MG 24 hr tablet  Commonly known as: TOPROL-XL  What  changed:   · medication strength  · how much to take  · when to take this   50 mg, Oral, Every 24 Hours Scheduled   Start Date: December 30, 2021        Continue These Medications      Instructions Start Date   amLODIPine 5 MG tablet  Commonly known as: NORVASC   5 mg, Oral, Daily      atorvastatin 20 MG tablet  Commonly known as: LIPITOR   20 mg, Oral, Daily      cetirizine 10 MG tablet  Commonly known as: zyrTEC   10 mg, Oral, Daily      clopidogrel 75 MG tablet  Commonly known as: PLAVIX   75 mg, Oral, Daily      donepezil 5 MG tablet  Commonly known as: ARICEPT   5 mg, Oral, Nightly      escitalopram 10 MG tablet  Commonly known as: LEXAPRO   10 mg, Oral, Daily      lisinopril 20 MG tablet  Commonly known as: PRINIVIL,ZESTRIL   20 mg, Oral, Daily      multivitamin with minerals tablet tablet   1 tablet, Oral, Daily         Stop These Medications    aspirin 81 MG chewable tablet             Your medication list      START taking these medications      Instructions Last Dose Given Next Dose Due   enoxaparin 40 MG/0.4ML solution syringe  Commonly known as: LOVENOX      Inject 0.4 mL under the skin into the appropriate area as directed Daily for 8 doses. Indications: Post Surgical - Hip       HYDROcodone-acetaminophen 7.5-325 MG per tablet  Commonly known as: NORCO      Take 1 tablet by mouth Every 6 (Six) Hours As Needed for Moderate Pain for up to 5 days.          CHANGE how you take these medications      Instructions Last Dose Given Next Dose Due   metoprolol succinate XL 50 MG 24 hr tablet  Commonly known as: TOPROL-XL  Start taking on: December 30, 2021  What changed:   · medication strength  · how much to take  · when to take this      Take 1 tablet by mouth Daily for 30 days.          CONTINUE taking these medications      Instructions Last Dose Given Next Dose Due   amLODIPine 5 MG tablet  Commonly known as: NORVASC      Take 5 mg by mouth Daily.       atorvastatin 20 MG tablet  Commonly known as: LIPITOR       Take 20 mg by mouth Daily.       cetirizine 10 MG tablet  Commonly known as: zyrTEC      Take 10 mg by mouth Daily.       clopidogrel 75 MG tablet  Commonly known as: PLAVIX      Take 75 mg by mouth Daily.       donepezil 5 MG tablet  Commonly known as: ARICEPT      Take 5 mg by mouth Every Night.       escitalopram 10 MG tablet  Commonly known as: LEXAPRO      Take 10 mg by mouth Daily.       lisinopril 20 MG tablet  Commonly known as: PRINIVIL,ZESTRIL      Take 20 mg by mouth Daily.       multivitamin with minerals tablet tablet      Take 1 tablet by mouth Daily.          STOP taking these medications    aspirin 81 MG chewable tablet              Where to Get Your Medications      These medications were sent to [facility]  [address], [place] [address]    Hours: 8AM-6PM Mon-Fri Phone: [phone]   · enoxaparin 40 MG/0.4ML solution syringe  · HYDROcodone-acetaminophen 7.5-325 MG per tablet  · metoprolol succinate XL 50 MG 24 hr tablet         Diet Instructions     Diet: Cardiac      Discharge Diet: Cardiac        No future appointments.  Additional Instructions for the Follow-ups that You Need to Schedule     Ambulatory Referral to Home Health   As directed      Face to Face Visit Date: 12/29/2021    Follow-up provider for Plan of Care?: I treated the patient in an acute care facility and will not continue treatment after discharge.    Follow-up provider: TIFFANY ANTUNEZ [[id number]]    Reason/Clinical Findings: s/p hip fracture    Describe mobility limitations that make leaving home difficult: healing from hip fracture    Nursing/Therapeutic Services Requested: Physical Therapy Occupational Therapy    PT orders: Therapeutic exercise Strengthening Gait Training Transfer training    Weight Bearing Status: As Tolerated    Occupational orders: Activities of daily living Strengthening Home safety assessment    Frequency: 1 Week 1         Discharge Follow-up with PCP   As directed       Currently  Documented PCP:    Rosario Cates MD    PCP Phone Number:    [phone]     Follow Up Details: one week--followup of hip fracture         Discharge Follow-up with Specified Provider: Dr Montemayor; 1 Week   As directed      To: Dr Montemayor    Follow Up: 1 Week    Follow Up Details: followup of hip fracture/orif            Follow-up Information     Nicholas Montemayor DO. Go on 12/30/2021.    Specialty: Orthopedic Surgery  Why: at 2:30 PM for Ortho follow up  Contact information:  [address] [place] [address] [phone]             Rosario Cates MD. Go on 1/6/2022.    Specialty: Family Medicine  Why: at 2:30 PM in the [place] OFFICE--followup of hip fracture  Contact information:  [address] [address] [phone]                          TEST  RESULTS PENDING AT DISCHARGE       CODE STATUS  Code Status and Medical Interventions:   Ordered at: 12/15/21 1445     Level Of Support Discussed With:    Patient     Code Status (Patient has no pulse and is not breathing):    CPR (Attempt to Resuscitate)     Medical Interventions (Patient has pulse or is breathing):    Full Support       Dewey Campos MD  [facility]  12/29/21  10:32 EST    Please note that this discharge summary required less than 30 minutes to complete.

## 2021-12-29 NOTE — PROGRESS NOTES
Occupational Therapy: Individual: 15 minutes.    Physical Therapy:    Speech Language Pathology:    Signed by: Sherry Varela, Occupational Therapist

## 2021-12-29 NOTE — PROGRESS NOTES
Patient Assessment Instrument  Quality Indicators - Discharge    Section GG. Self-Care Performance      Section GG. Mobility Performance     Roll Left and Right: Patient completed the activities by him/herself with no  assistance from a helper.   Sit to Lying: Patient completed the activities by him/herself with no  assistance from a helper.   Lying to Sitting on Side of Bed: Patient completed the activities by  him/herself with no assistance from a helper.   Sit to Stand: [place] provides verbal cues and/or touching/steadying and/or  contact guard assistance as patient completes activity. Assistance may be  provided throughout the activity or intermittently.   Chair/Bed to Chair Transfer: [place] provides verbal cues and/or  touching/steadying and/or contact guard assistance as patient completes  activity. Assistance may be provided throughout the activity or intermittently.   Toilet Transfer [place] provides verbal cues and/or touching/steadying and/or  contact guard assistance as patient completes activity. Assistance may be  provided throughout the activity or intermittently.   Car Transfer: [place] provides verbal cues and/or touching/steadying and/or  contact guard assistance as patient completes activity. Assistance may be  provided throughout the activity or intermittently.   Walk 10 Feet:   [place] provides verbal cues and/or touching/steadying and/or  contact guard assistance as patient completes activity. Assistance may be  provided throughout the activity or intermittently.  Walk 50 Feet with 2 Turns:   [place] provides verbal cues and/or  touching/steadying and/or contact guard assistance as patient completes  activity. Assistance may be provided throughout the activity or intermittently.  Walk 150 Feet:   [place] provides verbal cues and/or touching/steadying and/or  contact guard assistance as patient completes activity. Assistance may be  provided throughout the activity or intermittently.  Walking 10 Feet  on Uneven Surfaces:   Not attempted due to medical or safety  concerns.  1 Step Over Curb or Up/Down Stair:   Not attempted due to medical or safety  concerns.  Picking up an Object:   Not attempted due to medical or safety concerns. Uses  Wheelchair and/or Scooter: No    Section J. Health Conditions Discharge      Section M. Skin Conditions Discharge      . Current Number of Unhealed Pressure Ulcers      Section N. Medication    Signed by: Casie Hernandez PTA

## 2021-12-29 NOTE — PROGRESS NOTES
Patient Assessment Instrument  Quality Indicators - Discharge    Section GG. Self-Care Performance     Eating: [place] sets up or cleans up; patient completes activity. [place] assists  only prior to or following the activity.   Oral Hygiene: [place] sets up or cleans up; patient completes activity. [place]  assists only prior to or following the activity.   Toileting Hygiene: : [place]ate does less than half the effort. [place] lifts, holds  or supports trunk or limbs but provides less than half the effort.   Shower/Bathe Self: [place] does less than half the effort. [place] lifts, holds  or supports trunk or limbs but provides less than half the effort.   Upper Body Dressing: [place]ate sets up or cleans up; patient completes activity.  [place] assists only prior to or following the activity.   Lower Body Dressing: [place] does less than half the effort. Amonate lifts, holds  or supports trunk or limbs but provides less than half the effort.   Putting On/Taking Off Footwear: [place]ate does less than half the effort. [place]ate  lifts, holds or supports trunk or limbs but provides less than half the effort.    Section GG. Mobility Performance      Section J. Health Conditions Discharge      Section M. Skin Conditions Discharge      . Current Number of Unhealed Pressure Ulcers      Section N. Medication    Signed by: Sherry Varela, Occupational Therapist

## 2021-12-29 NOTE — SIGNIFICANT NOTE
12/29/21 1528   PT Discharge Summary   Reason for Discharge Discharge from facility   Outcomes Achieved Patient able to partially acheive established goals   Discharge Destination Home with assist; Home with home health

## 2021-12-29 NOTE — PROGRESS NOTES
Occupational Therapy:    Physical Therapy: Individual: 25 minutes.    Speech Language Pathology:    Signed by: Casie Hernandez PTA

## 2021-12-29 NOTE — DISCHARGE INSTR - OTHER ORDERS
[facility] [phone] for PT/OT.    [facility] [phone] for pediatric rolling walker and bedside commode.

## 2021-12-29 NOTE — THERAPY DISCHARGE NOTE
Inpatient Rehabilitation - IRF Occupational Therapy Treatment Note/Discharge   [facility]     Patient Name: Mellisa Sneed  : 1945  MRN: [id number]  Today's Date: 2021               Admit Date: 12/15/2021       ICD-10-CM ICD-9-CM   1. S/P right hip fracture  Z87.81 V15.51     Patient Active Problem List   Diagnosis   • S/P right hip fracture     Past Medical History:   Diagnosis Date   • Arthritis    • CHF (congestive heart failure) (HCC)    • Hypertension      Past Surgical History:   Procedure Laterality Date   • CARDIAC SURGERY     • FRACTURE SURGERY         IRF OT ASSESSMENT FLOWSHEET (last 12 hours)     IRF OT Evaluation and Treatment     Row Name 21 1252          OT Time and Intention    Document Type discharge evaluation  -TM     Mode of Treatment occupational therapy  -TM     Symptoms Noted During/After Treatment none  -TM     Row Name 21 1252          General Information    General Observations of Patient Pt's son and ex-daughter in law present for pt/family training/educ with recommended 24 hour assistance/supervision with family verb understanding. Family verb understanding of all ADLs/transfers.  -TM     Existing Precautions/Restrictions fall; hip  -TM     Limitations/Impairments safety/cognitive  -TM     Row Name 21 1252          Cognition/Psychosocial    Orientation Status (Cognition) oriented to; person; situation; verbal cues/prompts needed for orientation; other (see comments)  confusion at times  -TM     Follows Commands (Cognition) verbal cues/prompting required; follows one-step commands  -TM     Row Name 21 1252          Transfers    [place] Level (Toilet Transfer) contact guard; verbal cues  -TM     Assistive Device (Toilet Transfer) grab bars/safety frame; raised toilet seat; wheelchair  -TM     Row Name 21 1252          Bathing    Position (Bathing) supported sitting  -TM     Comment (Bathing) [place]; recommended sponge bathing with pt/family verb  understanding.  -TM     Row Name 12/29/21 1252          Upper Body Dressing    Position (Upper Body Dressing) supported sitting  -TM     Comment (Upper Body Dressing) setup  -TM     Row Name 12/29/21 1252          Lower Body Dressing    Position (Lower Body Dressing) supported sitting  -TM     Comment (Lower Body Dressing) [place]  -TM     Row Name 12/29/21 1252          Grooming    [place] Level (Grooming) set up  -TM     Position (Grooming) supported sitting  -TM     Row Name 12/29/21 1252          Toileting    Assistive Device Use (Toileting) raised toilet seat; grab bar/safety frame  -TM     Position (Toileting) supported sitting  -TM     Comment (Toileting) modA  -TM     Row Name 12/29/21 1252          Self-Feeding    [place] Level (Self-Feeding) set up  -TM     Position (Self-Feeding) supported sitting  -TM     Row Name 12/29/21 1252          LB Dressing Goal 2 (OT-IRF)    Progress/Outcomes (LB Dressing Goal 2, OT-IRF) goal met  -TM     Row Name 12/29/21 1252          Toileting Goal 2 (OT-IRF)    Progress/Outcomes (Toileting Goal 2, OT-IRF) goal met  -TM     Row Name 12/29/21 1252          Therapy Plan Review/Discharge Plan (OT)    Expected Discharge Disposition (OT) home with 24/7 care; home with home health care  -           User Key  (r) = Recorded By, (t) = Taken By, (c) = Cosigned By    Initials Name Effective Dates     Avery Sherryfrank Turcios, OT 06/16/21 -                    Occupational Therapy Education                 Title: PT OT SLP Therapies (Done)     Topic: Occupational Therapy (Resolved)     Point: ADL training (Resolved)     Description:   Instruct learner(s) on proper safety adaptation and remediation techniques during self care or transfers.   Instruct in proper use of assistive devices.              Learning Progress Summary           Patient Acceptance, E,D, VU,NR by TM at 12/29/2021 1251   Family Acceptance, E,D, VU,NR by TM at 12/29/2021 1251      Show all documentation for this  point (13)                 Point: Precautions (Resolved)     Description:   Instruct learner(s) on prescribed precautions during self-care and functional transfers.              Learning Progress Summary           Patient Acceptance, E,D, VU,NR by TM at 12/29/2021 1251   Family Acceptance, E,D, VU,NR by TM at 12/29/2021 1251      Show all documentation for this point (13)                             User Key     Initials Effective Dates Name Provider Type Discipline    TM 06/16/21 -  Sherry Varela, OT Occupational Therapist OT                OT Recommendation and Plan  Planned Therapy Interventions (OT): activity tolerance training, adaptive equipment training, BADL retraining, IADL retraining, ROM/therapeutic exercise, strengthening exercise, transfer/mobility retraining, occupation/activity based interventions, patient/caregiver education/training           OT IRF GOALS     Row Name 12/29/21 1252 12/22/21 1402 12/16/21 1107       UB Dressing Goal 1 (OT-IRF)    [place] (UB Dress Goal 1, OT-IRF) -- -- set-up required  -TM    Time Frame (UB Dressing Goal 1, OT-IRF) -- -- short-term goal (STG)  -TM    Progress/Outcomes (UB Dressing Goal 1, OT-IRF) -- goal met  -TM --       LB Dressing Goal 1 (OT-IRF)    [place] (LB Dressing Goal 1, OT-IRF) -- -- maximum assist (25-49% patient effort); verbal cues required  -TM    Time Frame (LB Dressing Goal 1, OT-IRF) -- -- short-term goal (STG)  -TM    Progress/Outcomes (LB Dressing Goal 1, OT-IRF) -- goal met  -TM --       LB Dressing Goal 2 (OT-IRF)    [place] (LB Dressing Goal 2, OT-IRF) -- -- moderate assist (50-74% patient effort)  -TM    Time Frame (LB Dressing Goal 2, OT-IRF) -- -- long-term goal (LTG); by discharge  -TM    Progress/Outcomes (LB Dressing Goal 2, OT-IRF) goal met  -TM goal ongoing  -TM --       Toileting Goal 1 (OT-IRF)    [place] Level (Toileting Goal 1, OT-IRF) -- -- maximum assist (25-49% patient effort)  -TM    Progress/Outcomes  (Toileting Goal 1, OT-IRF) -- goal met  -TM --    Time Frame (Toileting Goal 1, OT-IRF) -- -- short-term goal (STG)  -TM       Toileting Goal 2 (OT-IRF)    [place] Level (Toileting Goal 2, OT-IRF) -- -- moderate assist (50-74% patient effort)  -TM    Progress/Outcomes (Toileting Goal 2, OT-IRF) goal met  -TM goal ongoing  -TM --    Time Frame (Toileting Goal 2, OT-IRF) -- -- long-term goal (LTG); by discharge  -TM          User Key  (r) = Recorded By, (t) = Taken By, (c) = Cosigned By    Initials Name Provider Type    Sherry Reagan OT Occupational Therapist                    Time Calculation:    Time Calculation- OT     Row Name 12/29/21 1252             Time Calculation- OT    Total Timed Code Minutes- OT 15 minute(s)  -TM      OT Non-Billable Time (min) 30 min  -TM            User Key  (r) = Recorded By, (t) = Taken By, (c) = Cosigned By    Initials Name Provider Type     Sherry Varela OT Occupational Therapist                Therapy Charges for Today     Code Description Service Date Service Provider Modifiers Qty    02658465183 HC OT THERAPEUTIC ACT EA 15 MIN 12/28/2021 Sherry Varela OT GO 1    10700209952 HC OT SELF CARE/MGMT/TRAIN EA 15 MIN 12/28/2021 Sherry Varela, OT GO 1    59861369869 HC OT THERAPEUTIC ACT EA 15 MIN 12/28/2021 Sherry Varela OT GO 2    45086305386 HC OT THER PROC EA 15 MIN 12/28/2021 Sherry Varela OT GO 1    42239871767 HC OT SELF CARE/MGMT/TRAIN EA 15 MIN 12/28/2021 Sherry Varela OT GO 1    78752288625 HC OT SELF CARE/MGMT/TRAIN EA 15 MIN 12/29/2021 Sherry Varela OT GO 1               OT Discharge Summary  Reason for Discharge: Discharge from facility  Outcomes Achieved: Refer to plan of care for updates on goals achieved  Discharge Destination: Home with home health, Home with assist    Sherry Varela OT  12/29/2021

## 2021-12-29 NOTE — THERAPY DISCHARGE NOTE
Inpatient Rehabilitation - Physical Therapy Treatment Note/Discharge  [facility]     Patient Name: Mellisa Sneed  : 1945  MRN: [id number]  Today's Date: 2021                Admit Date: 12/15/2021    Visit Dx:    ICD-10-CM ICD-9-CM   1. S/P right hip fracture  Z87.81 V15.51     Patient Active Problem List   Diagnosis   • S/P right hip fracture     Past Medical History:   Diagnosis Date   • Arthritis    • CHF (congestive heart failure) (HCC)    • Hypertension      Past Surgical History:   Procedure Laterality Date   • CARDIAC SURGERY     • FRACTURE SURGERY         PT ASSESSMENT (last 12 hours)     IRF PT Evaluation and Treatment     Row Name 21 1521          PT Time and Intention    Document Type --  Discharge treatment  -LL     Mode of Treatment individual therapy; physical therapy  -LL     Patient/Family/Caregiver Comments/Observations Patient discharged this date from IRF to home with assistance of family and home health PT referral.  Education completed this date with patient and ex-daughter-in-law regarding home safety, patient & caregiver safety, current level of assistance required for functional mobility, proper use of gait belt, hip precautions,need for 24 hour assistance/supervision, HEP and adjustment of AD.  Written materials issued & no questions/concerns voiced.  -LL     Row Name 21 152          General Information    Existing Precautions/Restrictions fall; right; hip  history of dementia  -LL     Row Name 21 152          Cognition/Psychosocial    Affect/Mental Status (Cognitive) WFL  but mild confusion  -LL     Orientation Status (Cognition) oriented to; person; situation; other (see comments)  -LL     Follows Commands (Cognition) verbal cues/prompting required; physical/tactile prompts required  -LL     Personal Safety Interventions gait belt; nonskid shoes/slippers when out of bed; supervised activity  -LL     Row Name 21 152          Pain Scale: FACES  Pre/Post-Treatment    Pain: FACES Scale, Pretreatment 4-->hurts little more  -LL     Posttreatment Pain Rating 4-->hurts little more  -LL     Row Name 12/29/21 1521          Mobility    Left Lower Extremity (Weight-bearing Status) weight-bearing as tolerated (WBAT)  -LL     Right Lower Extremity (Weight-bearing Status) weight-bearing as tolerated (WBAT)  -LL     Row Name 12/29/21 1521          Bed Mobility    Supine-Sit [place] (Bed Mobility) modified independence  -LL     Assistive Device (Bed Mobility) bed rails  -LL     Row Name 12/29/21 1521          Transfer Assessment/Treatment    Transfers car transfer  -LL     Row Name 12/29/21 1521          Transfers    Bed-Chair [place] (Transfers) contact guard; verbal cues  -LL     Assistive Device (Bed-Chair Transfers) wheelchair  -LL     Sit-Stand [place] (Transfers) verbal cues; nonverbal cues (demo/gesture); contact guard  -LL     Stand-Sit [place] (Transfers) verbal cues; nonverbal cues (demo/gesture); contact guard  -LL     Row Name 12/29/21 1521          Sit-Stand Transfer    Assistive Device (Sit-Stand Transfers) walker, front-wheeled  -LL     Row Name 12/29/21 1521          Stand-Sit Transfer    Assistive Device (Stand-Sit Transfers) walker, front-wheeled  -LL     Row Name 12/29/21 1521          Motor Skills    Therapeutic Exercise --  HEP reviewed & GTB issued  -     Row Name 12/29/21 1521          IRF PT Goals    Bed Mobility Goal Selection (PT-IRF) bed mobility, PT goal 1  -LL     Transfer Goal Selection (PT-IRF) transfers, PT goal 1  -LL     Gait (Walking Locomotion) Goal Selection (PT-IRF) gait, PT goal 1  -LL     Row Name 12/29/21 1521          Bed Mobility Goal 1 (PT-IRF)    Activity/Assistive Device (Bed Mobility Goal 1, PT-IRF) sit to supine/supine to sit  -LL     [place] Level (Bed Mobility Goal 1, PT-IRF) independent  -LL     Time Frame (Bed Mobility Goal 1, PT-IRF) by discharge  -LL     Progress/Outcomes (Bed Mobility Goal  1, PT-IRF) goal met  -LL     Row Name 12/29/21 1521          Transfer Goal 1 (PT-IRF)    Activity/Assistive Device (Transfer Goal 1, PT-IRF) sit-to-stand/stand-to-sit; bed-to-chair/chair-to-bed  -LL     [place] Level (Transfer Goal 1, PT-IRF) supervision required  -LL     Time Frame (Transfer Goal 1, PT-IRF) by discharge  -LL     Progress/Outcomes (Transfer Goal 1, PT-IRF) goal partially met  -LL     Row Name 12/29/21 1521          Gait/Walking Locomotion Goal 1 (PT-IRF)    Activity/Assistive Device (Gait/Walking Locomotion Goal 1, PT-IRF) walker, rolling  -LL     Gait/Walking Locomotion Distance Goal 1 (PT-IRF) 300'  -LL     [place] Level (Gait/Walking Locomotion Goal 1, PT-IRF) supervision required  -LL     Time Frame (Gait/Walking Locomotion Goal 1, PT-IRF) by discharge  -LL     Progress/Outcomes (Gait/Walking Locomotion Goal 1, PT-IRF) goal met  -LL     Row Name 12/29/21 1521          Positioning and Restraints    Pre-Treatment Position in bed  -LL     Post Treatment Position wheelchair  -LL     In Wheelchair sitting; with family/caregiver  -     Row Name 12/29/21 1521          Therapy Assessment/Plan (PT)    Patient's Goals For Discharge return home  -     Row Name 12/29/21 1521          Therapy Assessment/Plan (PT)    Rehab Potential/Prognosis (PT) adequate, monitor progress closely  -LL     Frequency of Treatment (PT) 5 times per week  -LL     Estimated Duration of Therapy (PT) 2 weeks  -LL     Problem List (PT) balance; cognition; mobility; range of motion (ROM); strength; pain  -LL     Activity Limitations Related to Problem List (PT) unable to ambulate safely; unable to transfer safely  -     Row Name 12/29/21 1521          Therapy Plan Review/Discharge Plan (PT)    Anticipated Equipment Needs at Discharge (PT Eval) --  tbd  -LL     Expected Discharge Disposition (PT Eval) home with home health care; home with caregiver  -LL           User Key  (r) = Recorded By, (t) = Taken By, (c) =  Cosigned By    Initials Name Provider Type    LL Casie Hernandez PTA Physical Therapy Assistant                Physical Therapy Education                 Title: PT OT SLP Therapies (Resolved)     Topic: Physical Therapy (Resolved)     Point: Mobility training (Resolved)     Learning Progress Summary           Patient Acceptance, E,D,H, VU by LL at 12/29/2021 1527   Caregiver Acceptance, E,D,H, VU by LL at 12/29/2021 1527      Show all documentation for this point (10)                 Point: Home exercise program (Resolved)     Learning Progress Summary           Patient Acceptance, E,D,H, VU by LL at 12/29/2021 1527   Caregiver Acceptance, E,D,H, VU by LL at 12/29/2021 1527      Show all documentation for this point (10)                 Point: Body mechanics (Resolved)     Learning Progress Summary           Patient Acceptance, E,D,H, VU by LL at 12/29/2021 1527   Caregiver Acceptance, E,D,H, VU by LL at 12/29/2021 1527      Show all documentation for this point (10)                 Point: Precautions (Resolved)     Learning Progress Summary           Patient Acceptance, E,D,H, VU by LL at 12/29/2021 1527   Caregiver Acceptance, E,D,H, VU by LL at 12/29/2021 1527      Show all documentation for this point (10)                             User Key     Initials Effective Dates Name Provider Type [facility] 05/02/16 -  Casie Hernandez PTA Physical Therapy Assistant PT                PT Recommendation and Plan                  Time Calculation:    PT Charges     Row Name 12/29/21 1528             Time Calculation    Start Time 1130  -LL      Stop Time 1155  -LL      Time Calculation (min) 25 min  -LL      PT Received On 12/29/21  -LL              Time Calculation- PT    Total Timed Code Minutes- PT 25 minute(s)  -LL            User Key  (r) = Recorded By, (t) = Taken By, (c) = Cosigned By    Initials Name Provider Type    LL Casie Hernandez PTA Physical Therapy Assistant                Therapy Charges for Today      Code Description Service Date Service Provider Modifiers Qty    20412422764 HC GAIT TRAINING EA 15 MIN 12/28/2021 Casie Hernandez, JAMARI GP 1    46472446430 HC PT THERAPEUTIC ACT EA 15 MIN 12/28/2021 Casie Hernandez, JAMARI GP 2    08599282800 HC PT THER PROC EA 15 MIN 12/28/2021 Casie Hernandez, JAMARI GP 4    00400250681 HC PT THERAPEUTIC ACT EA 15 MIN 12/29/2021 Casie Hernandez, JAMARI GP 1    78199357584 HC PT THER PROC EA 15 MIN 12/29/2021 Casie Hernandez, JAMARI GP 1               PT Discharge Summary  Reason for Discharge: Discharge from facility  Outcomes Achieved: Patient able to partially acheive established goals  Discharge Destination: Home with assist, Home with home health    Madhav Hernandez PTA  12/29/2021

## 2021-12-29 NOTE — PROGRESS NOTES
Patient Assessment Instrument  Quality Indicators - Discharge    Section GG. Self-Care Performance      Section GG. Mobility Performance      Section J. Health Conditions Discharge      Section M. Skin Conditions Discharge  Unhealed Pressure Ulcer(s)/Injurie(s) at Stage 1 or Higher:  No    . Current Number of Unhealed Pressure Ulcers      Section N. Medication    Signed by: Phyllis Johnson Nurse

## 2021-12-30 NOTE — PROGRESS NOTES
Patient Assessment Instrument  Quality Indicators - Discharge    Section GG. Self-Care Performance      Section GG. Mobility Performance      Section J. Health Conditions Discharge  Fall(s) Since Admission:  No    Section M. Skin Conditions Discharge      . Current Number of Unhealed Pressure Ulcers      Section N. Medication    Medication Intervention: N/A - There were no potential clinically significant  medication issues identified since admission or patient is not taking any  medications.    Signed by: Maddi Poe, Supervisor

## 2022-08-18 NOTE — PROGRESS NOTES
Inpatient Rehabilitation Functional Measures Assessment and Plan of Care    Plan of Care    Self Care    Dressing (Lower) (Active)  Current Status (12/16/2021 10:55:00 AM): total/maxA  Weekly Goal: maxA  Discharge Goal: modA    Functional Measures  SEAN Eating:  SEAN Grooming:  SEAN Bathing:  SEAN Upper Body Dressing:  SEAN Lower Body Dressing:  SEAN Toileting:    SEAN Bladder Management  Level of Assistance:  Frequency/Number of Accidents this Shift:    SEAN Bowel Management  Level of Assistance:  Frequency/Number of Accidents this Shift:    SEAN Bed/Chair/Wheelchair Transfer:  SEAN Toilet Transfer:  SEAN Tub/Shower Transfer:    Previously Documented Mode of Locomotion at Discharge:  [facility] Expected Mode of Locomotion at Discharge:  SEAN Walk/Wheelchair:  SEAN Stairs:    SEAN Comprehension:  SEAN Expression:  [facility] Social Interaction:  [facility] Problem Solving:  SEAN Memory:    Therapy Mode Minutes  Occupational Therapy:  Physical Therapy:  Speech Language Pathology:    Discharge Functional Goals:    Signed by: Sherry Varela, Occupational Therapist     no